# Patient Record
Sex: MALE | Race: WHITE | NOT HISPANIC OR LATINO | Employment: FULL TIME | URBAN - METROPOLITAN AREA
[De-identification: names, ages, dates, MRNs, and addresses within clinical notes are randomized per-mention and may not be internally consistent; named-entity substitution may affect disease eponyms.]

---

## 2021-10-05 ENCOUNTER — TELEPHONE (OUTPATIENT)
Dept: ORTHOPEDICS | Facility: CLINIC | Age: 41
End: 2021-10-05

## 2021-10-07 ENCOUNTER — TELEPHONE (OUTPATIENT)
Dept: ORTHOPEDICS | Facility: CLINIC | Age: 41
End: 2021-10-07

## 2021-10-07 ENCOUNTER — OFFICE VISIT (OUTPATIENT)
Dept: ORTHOPEDICS | Facility: CLINIC | Age: 41
End: 2021-10-07
Payer: OTHER MISCELLANEOUS

## 2021-10-07 ENCOUNTER — HOSPITAL ENCOUNTER (OUTPATIENT)
Dept: RADIOLOGY | Facility: HOSPITAL | Age: 41
Discharge: HOME | End: 2021-10-07
Attending: ORTHOPAEDIC SURGERY
Payer: OTHER MISCELLANEOUS

## 2021-10-07 VITALS
RESPIRATION RATE: 18 BRPM | WEIGHT: 200 LBS | HEIGHT: 71 IN | BODY MASS INDEX: 28 KG/M2 | SYSTOLIC BLOOD PRESSURE: 117 MMHG | DIASTOLIC BLOOD PRESSURE: 75 MMHG | HEART RATE: 80 BPM

## 2021-10-07 DIAGNOSIS — G89.29 CHRONIC PAIN OF LEFT ANKLE: ICD-10-CM

## 2021-10-07 DIAGNOSIS — S82.62XA CLOSED AVULSION FRACTURE OF LATERAL MALLEOLUS OF LEFT FIBULA, INITIAL ENCOUNTER: Primary | ICD-10-CM

## 2021-10-07 DIAGNOSIS — S93.492A SPRAIN OF ANTERIOR TALOFIBULAR LIGAMENT, LEFT, INITIAL ENCOUNTER: ICD-10-CM

## 2021-10-07 DIAGNOSIS — M25.572 CHRONIC PAIN OF LEFT ANKLE: ICD-10-CM

## 2021-10-07 PROCEDURE — 73610 X-RAY EXAM OF ANKLE: CPT | Mod: TC,LT

## 2021-10-07 PROCEDURE — 99999 PR PBB SHADOW E&M-EST. PATIENT-LVL III: ICD-10-PCS | Mod: PBBFAC,,, | Performed by: ORTHOPAEDIC SURGERY

## 2021-10-07 PROCEDURE — 73610 X-RAY EXAM OF ANKLE: CPT | Mod: 26,LT,, | Performed by: RADIOLOGY

## 2021-10-07 PROCEDURE — 99999 PR PBB SHADOW E&M-EST. PATIENT-LVL III: CPT | Mod: PBBFAC,,, | Performed by: ORTHOPAEDIC SURGERY

## 2021-10-07 PROCEDURE — 99204 PR OFFICE/OUTPT VISIT, NEW, LEVL IV, 45-59 MIN: ICD-10-PCS | Mod: S$GLB,,, | Performed by: ORTHOPAEDIC SURGERY

## 2021-10-07 PROCEDURE — 73610 XR ANKLE COMPLETE 3 VIEW LEFT: ICD-10-PCS | Mod: 26,LT,, | Performed by: RADIOLOGY

## 2021-10-07 PROCEDURE — 99204 OFFICE O/P NEW MOD 45 MIN: CPT | Mod: S$GLB,,, | Performed by: ORTHOPAEDIC SURGERY

## 2021-10-07 RX ORDER — HYDROCODONE BITARTRATE AND ACETAMINOPHEN 10; 325 MG/1; MG/1
1 TABLET ORAL EVERY 4 HOURS
COMMUNITY
Start: 2021-09-29 | End: 2021-12-23 | Stop reason: SDUPTHER

## 2021-10-07 RX ORDER — SILDENAFIL CITRATE 20 MG/1
TABLET ORAL
COMMUNITY
Start: 2021-07-10

## 2021-10-07 RX ORDER — NAPROXEN 500 MG/1
500 TABLET ORAL 2 TIMES DAILY PRN
COMMUNITY
Start: 2021-09-29 | End: 2022-04-11

## 2021-10-14 ENCOUNTER — OFFICE VISIT (OUTPATIENT)
Dept: ORTHOPEDICS | Facility: CLINIC | Age: 41
End: 2021-10-14
Payer: OTHER MISCELLANEOUS

## 2021-10-14 ENCOUNTER — HOSPITAL ENCOUNTER (OUTPATIENT)
Dept: RADIOLOGY | Facility: HOSPITAL | Age: 41
Discharge: HOME | End: 2021-10-14
Attending: PHYSICIAN ASSISTANT
Payer: OTHER MISCELLANEOUS

## 2021-10-14 DIAGNOSIS — S82.62XA CLOSED AVULSION FRACTURE OF LATERAL MALLEOLUS OF LEFT FIBULA, INITIAL ENCOUNTER: ICD-10-CM

## 2021-10-14 DIAGNOSIS — S82.62XA CLOSED AVULSION FRACTURE OF LATERAL MALLEOLUS OF LEFT FIBULA, INITIAL ENCOUNTER: Primary | ICD-10-CM

## 2021-10-14 PROCEDURE — 99213 OFFICE O/P EST LOW 20 MIN: CPT | Mod: S$GLB,,, | Performed by: PHYSICIAN ASSISTANT

## 2021-10-14 PROCEDURE — 73610 XR ANKLE COMPLETE 3 VIEW LEFT: ICD-10-PCS | Mod: 26,LT,, | Performed by: RADIOLOGY

## 2021-10-14 PROCEDURE — 99999 PR PBB SHADOW E&M-EST. PATIENT-LVL III: CPT | Mod: PBBFAC,,, | Performed by: PHYSICIAN ASSISTANT

## 2021-10-14 PROCEDURE — 73610 X-RAY EXAM OF ANKLE: CPT | Mod: TC,LT

## 2021-10-14 PROCEDURE — 73610 X-RAY EXAM OF ANKLE: CPT | Mod: 26,LT,, | Performed by: RADIOLOGY

## 2021-10-14 PROCEDURE — 99999 PR PBB SHADOW E&M-EST. PATIENT-LVL III: ICD-10-PCS | Mod: PBBFAC,,, | Performed by: PHYSICIAN ASSISTANT

## 2021-10-14 PROCEDURE — 99213 PR OFFICE/OUTPT VISIT, EST, LEVL III, 20-29 MIN: ICD-10-PCS | Mod: S$GLB,,, | Performed by: PHYSICIAN ASSISTANT

## 2021-10-14 RX ORDER — MELOXICAM 15 MG/1
15 TABLET ORAL DAILY
Qty: 30 TABLET | Refills: 3 | Status: SHIPPED | OUTPATIENT
Start: 2021-10-14 | End: 2021-11-13

## 2021-10-14 RX ORDER — METHOCARBAMOL 500 MG/1
500 TABLET, FILM COATED ORAL 3 TIMES DAILY
Qty: 42 TABLET | Refills: 0 | Status: SHIPPED | OUTPATIENT
Start: 2021-10-14 | End: 2021-10-28

## 2021-10-14 RX ORDER — OXYCODONE HYDROCHLORIDE 5 MG/1
5 TABLET ORAL EVERY 4 HOURS PRN
Qty: 42 TABLET | Refills: 0 | Status: SHIPPED | OUTPATIENT
Start: 2021-10-14 | End: 2021-10-21

## 2021-10-14 RX ORDER — GABAPENTIN 100 MG/1
100 CAPSULE ORAL 3 TIMES DAILY
Qty: 90 CAPSULE | Refills: 0 | Status: SHIPPED | OUTPATIENT
Start: 2021-10-14 | End: 2021-12-28

## 2021-10-14 RX ORDER — ACETAMINOPHEN 500 MG
1000 TABLET ORAL EVERY 8 HOURS
Qty: 90 TABLET | Refills: 0 | Status: SHIPPED | OUTPATIENT
Start: 2021-10-14 | End: 2022-03-31 | Stop reason: SDUPTHER

## 2021-10-27 ENCOUNTER — CLINICAL SUPPORT (OUTPATIENT)
Dept: REHABILITATION | Facility: HOSPITAL | Age: 41
End: 2021-10-27
Attending: PHYSICIAN ASSISTANT
Payer: OTHER MISCELLANEOUS

## 2021-10-27 DIAGNOSIS — M25.572 ACUTE LEFT ANKLE PAIN: ICD-10-CM

## 2021-10-27 DIAGNOSIS — R26.2 DIFFICULTY IN WALKING INVOLVING ANKLE AND FOOT JOINT: ICD-10-CM

## 2021-10-27 DIAGNOSIS — M25.672 ANKLE STIFFNESS, LEFT: ICD-10-CM

## 2021-10-27 PROCEDURE — 97161 PT EVAL LOW COMPLEX 20 MIN: CPT | Mod: PN

## 2021-10-28 ENCOUNTER — OFFICE VISIT (OUTPATIENT)
Dept: ORTHOPEDICS | Facility: CLINIC | Age: 41
End: 2021-10-28
Payer: OTHER MISCELLANEOUS

## 2021-10-28 ENCOUNTER — HOSPITAL ENCOUNTER (OUTPATIENT)
Dept: RADIOLOGY | Facility: HOSPITAL | Age: 41
Discharge: HOME | End: 2021-10-28
Attending: PHYSICIAN ASSISTANT
Payer: OTHER MISCELLANEOUS

## 2021-10-28 VITALS — HEIGHT: 71 IN | BODY MASS INDEX: 27.99 KG/M2 | WEIGHT: 199.94 LBS

## 2021-10-28 DIAGNOSIS — S93.492A SPRAIN OF ANTERIOR TALOFIBULAR LIGAMENT, LEFT, INITIAL ENCOUNTER: ICD-10-CM

## 2021-10-28 DIAGNOSIS — S82.62XD CLOSED AVULSION FRACTURE OF LATERAL MALLEOLUS OF LEFT FIBULA WITH ROUTINE HEALING, SUBSEQUENT ENCOUNTER: Primary | ICD-10-CM

## 2021-10-28 DIAGNOSIS — S82.62XD CLOSED AVULSION FRACTURE OF LATERAL MALLEOLUS OF LEFT FIBULA WITH ROUTINE HEALING, SUBSEQUENT ENCOUNTER: ICD-10-CM

## 2021-10-28 PROCEDURE — 99213 OFFICE O/P EST LOW 20 MIN: CPT | Mod: S$GLB,,, | Performed by: PHYSICIAN ASSISTANT

## 2021-10-28 PROCEDURE — 99213 PR OFFICE/OUTPT VISIT, EST, LEVL III, 20-29 MIN: ICD-10-PCS | Mod: S$GLB,,, | Performed by: PHYSICIAN ASSISTANT

## 2021-10-28 PROCEDURE — 73610 X-RAY EXAM OF ANKLE: CPT | Mod: 26,LT,, | Performed by: RADIOLOGY

## 2021-10-28 PROCEDURE — 99999 PR PBB SHADOW E&M-EST. PATIENT-LVL III: ICD-10-PCS | Mod: PBBFAC,,, | Performed by: PHYSICIAN ASSISTANT

## 2021-10-28 PROCEDURE — 73610 X-RAY EXAM OF ANKLE: CPT | Mod: TC,LT

## 2021-10-28 PROCEDURE — 73610 XR ANKLE COMPLETE 3 VIEW LEFT: ICD-10-PCS | Mod: 26,LT,, | Performed by: RADIOLOGY

## 2021-10-28 PROCEDURE — 99999 PR PBB SHADOW E&M-EST. PATIENT-LVL III: CPT | Mod: PBBFAC,,, | Performed by: PHYSICIAN ASSISTANT

## 2021-10-28 RX ORDER — GABAPENTIN 300 MG/1
300 CAPSULE ORAL 3 TIMES DAILY
Qty: 90 CAPSULE | Refills: 11 | Status: SHIPPED | OUTPATIENT
Start: 2021-10-28 | End: 2021-12-28

## 2021-10-29 ENCOUNTER — CLINICAL SUPPORT (OUTPATIENT)
Dept: REHABILITATION | Facility: HOSPITAL | Age: 41
End: 2021-10-29
Payer: OTHER MISCELLANEOUS

## 2021-10-29 DIAGNOSIS — M25.572 ACUTE LEFT ANKLE PAIN: ICD-10-CM

## 2021-10-29 DIAGNOSIS — R26.2 DIFFICULTY IN WALKING INVOLVING ANKLE AND FOOT JOINT: ICD-10-CM

## 2021-10-29 DIAGNOSIS — M25.672 ANKLE STIFFNESS, LEFT: ICD-10-CM

## 2021-10-29 PROCEDURE — 97110 THERAPEUTIC EXERCISES: CPT | Mod: PN,CQ

## 2021-11-01 ENCOUNTER — CLINICAL SUPPORT (OUTPATIENT)
Dept: REHABILITATION | Facility: HOSPITAL | Age: 41
End: 2021-11-01
Payer: OTHER MISCELLANEOUS

## 2021-11-01 DIAGNOSIS — R26.2 DIFFICULTY IN WALKING INVOLVING ANKLE AND FOOT JOINT: ICD-10-CM

## 2021-11-01 DIAGNOSIS — M25.672 ANKLE STIFFNESS, LEFT: ICD-10-CM

## 2021-11-01 DIAGNOSIS — M25.572 ACUTE LEFT ANKLE PAIN: ICD-10-CM

## 2021-11-01 PROCEDURE — 97113 AQUATIC THERAPY/EXERCISES: CPT | Mod: PN,CQ

## 2021-11-02 ENCOUNTER — CLINICAL SUPPORT (OUTPATIENT)
Dept: REHABILITATION | Facility: HOSPITAL | Age: 41
End: 2021-11-02
Payer: OTHER MISCELLANEOUS

## 2021-11-02 DIAGNOSIS — R26.2 DIFFICULTY IN WALKING INVOLVING ANKLE AND FOOT JOINT: ICD-10-CM

## 2021-11-02 DIAGNOSIS — M25.572 ACUTE LEFT ANKLE PAIN: ICD-10-CM

## 2021-11-02 DIAGNOSIS — M25.672 ANKLE STIFFNESS, LEFT: ICD-10-CM

## 2021-11-02 PROCEDURE — 97110 THERAPEUTIC EXERCISES: CPT | Mod: PN,CQ

## 2021-11-04 ENCOUNTER — TELEPHONE (OUTPATIENT)
Dept: ORTHOPEDICS | Facility: CLINIC | Age: 41
End: 2021-11-04
Payer: OTHER MISCELLANEOUS

## 2021-11-04 DIAGNOSIS — S82.62XD CLOSED AVULSION FRACTURE OF LATERAL MALLEOLUS OF LEFT FIBULA WITH ROUTINE HEALING, SUBSEQUENT ENCOUNTER: Primary | ICD-10-CM

## 2021-11-04 RX ORDER — OXYCODONE HYDROCHLORIDE 5 MG/1
5 TABLET ORAL EVERY 4 HOURS PRN
Qty: 42 TABLET | Refills: 0 | Status: SHIPPED | OUTPATIENT
Start: 2021-11-04 | End: 2021-11-11

## 2021-11-04 RX ORDER — METHOCARBAMOL 500 MG/1
500 TABLET, FILM COATED ORAL 3 TIMES DAILY
Qty: 42 TABLET | Refills: 0 | Status: SHIPPED | OUTPATIENT
Start: 2021-11-04 | End: 2021-11-18

## 2021-11-05 ENCOUNTER — CLINICAL SUPPORT (OUTPATIENT)
Dept: REHABILITATION | Facility: HOSPITAL | Age: 41
End: 2021-11-05
Payer: OTHER MISCELLANEOUS

## 2021-11-05 DIAGNOSIS — M25.572 ACUTE LEFT ANKLE PAIN: ICD-10-CM

## 2021-11-05 DIAGNOSIS — R26.2 DIFFICULTY IN WALKING INVOLVING ANKLE AND FOOT JOINT: ICD-10-CM

## 2021-11-05 DIAGNOSIS — M25.672 ANKLE STIFFNESS, LEFT: ICD-10-CM

## 2021-11-05 PROCEDURE — 97110 THERAPEUTIC EXERCISES: CPT | Mod: PN

## 2021-11-08 ENCOUNTER — CLINICAL SUPPORT (OUTPATIENT)
Dept: REHABILITATION | Facility: HOSPITAL | Age: 41
End: 2021-11-08
Payer: OTHER MISCELLANEOUS

## 2021-11-08 DIAGNOSIS — M25.572 ACUTE LEFT ANKLE PAIN: ICD-10-CM

## 2021-11-08 DIAGNOSIS — M25.672 ANKLE STIFFNESS, LEFT: ICD-10-CM

## 2021-11-08 DIAGNOSIS — R26.2 DIFFICULTY IN WALKING INVOLVING ANKLE AND FOOT JOINT: ICD-10-CM

## 2021-11-08 PROCEDURE — 97110 THERAPEUTIC EXERCISES: CPT | Mod: PN

## 2021-11-09 ENCOUNTER — DOCUMENTATION ONLY (OUTPATIENT)
Dept: REHABILITATION | Facility: HOSPITAL | Age: 41
End: 2021-11-09
Payer: OTHER MISCELLANEOUS

## 2021-11-10 ENCOUNTER — CLINICAL SUPPORT (OUTPATIENT)
Dept: REHABILITATION | Facility: HOSPITAL | Age: 41
End: 2021-11-10
Payer: OTHER MISCELLANEOUS

## 2021-11-10 DIAGNOSIS — M25.572 ACUTE LEFT ANKLE PAIN: ICD-10-CM

## 2021-11-10 DIAGNOSIS — M25.672 ANKLE STIFFNESS, LEFT: ICD-10-CM

## 2021-11-10 DIAGNOSIS — R26.2 DIFFICULTY IN WALKING INVOLVING ANKLE AND FOOT JOINT: ICD-10-CM

## 2021-11-10 PROCEDURE — 97110 THERAPEUTIC EXERCISES: CPT | Mod: PN,CQ

## 2021-11-12 ENCOUNTER — CLINICAL SUPPORT (OUTPATIENT)
Dept: REHABILITATION | Facility: HOSPITAL | Age: 41
End: 2021-11-12
Payer: OTHER MISCELLANEOUS

## 2021-11-12 DIAGNOSIS — M25.572 ACUTE LEFT ANKLE PAIN: ICD-10-CM

## 2021-11-12 DIAGNOSIS — R26.2 DIFFICULTY IN WALKING INVOLVING ANKLE AND FOOT JOINT: ICD-10-CM

## 2021-11-12 DIAGNOSIS — M25.672 ANKLE STIFFNESS, LEFT: ICD-10-CM

## 2021-11-12 PROCEDURE — 97110 THERAPEUTIC EXERCISES: CPT | Mod: PN,CQ

## 2021-11-15 ENCOUNTER — CLINICAL SUPPORT (OUTPATIENT)
Dept: REHABILITATION | Facility: HOSPITAL | Age: 41
End: 2021-11-15
Payer: OTHER MISCELLANEOUS

## 2021-11-15 DIAGNOSIS — M25.572 ACUTE LEFT ANKLE PAIN: ICD-10-CM

## 2021-11-15 DIAGNOSIS — M25.672 ANKLE STIFFNESS, LEFT: ICD-10-CM

## 2021-11-15 DIAGNOSIS — R26.2 DIFFICULTY IN WALKING INVOLVING ANKLE AND FOOT JOINT: ICD-10-CM

## 2021-11-15 PROCEDURE — 97110 THERAPEUTIC EXERCISES: CPT | Mod: PN,CQ

## 2021-11-16 ENCOUNTER — CLINICAL SUPPORT (OUTPATIENT)
Dept: REHABILITATION | Facility: HOSPITAL | Age: 41
End: 2021-11-16
Payer: OTHER MISCELLANEOUS

## 2021-11-16 DIAGNOSIS — R26.2 DIFFICULTY IN WALKING INVOLVING ANKLE AND FOOT JOINT: ICD-10-CM

## 2021-11-16 DIAGNOSIS — M25.672 ANKLE STIFFNESS, LEFT: ICD-10-CM

## 2021-11-16 DIAGNOSIS — M25.572 ACUTE LEFT ANKLE PAIN: ICD-10-CM

## 2021-11-16 PROCEDURE — 97110 THERAPEUTIC EXERCISES: CPT | Mod: PN,CQ

## 2021-11-17 ENCOUNTER — TELEPHONE (OUTPATIENT)
Dept: NEUROLOGY | Facility: CLINIC | Age: 41
End: 2021-11-17
Payer: OTHER MISCELLANEOUS

## 2021-11-19 ENCOUNTER — CLINICAL SUPPORT (OUTPATIENT)
Dept: REHABILITATION | Facility: HOSPITAL | Age: 41
End: 2021-11-19
Payer: OTHER MISCELLANEOUS

## 2021-11-19 DIAGNOSIS — M25.672 ANKLE STIFFNESS, LEFT: ICD-10-CM

## 2021-11-19 DIAGNOSIS — R26.2 DIFFICULTY IN WALKING INVOLVING ANKLE AND FOOT JOINT: ICD-10-CM

## 2021-11-19 DIAGNOSIS — M25.572 ACUTE LEFT ANKLE PAIN: ICD-10-CM

## 2021-11-19 PROCEDURE — 97110 THERAPEUTIC EXERCISES: CPT | Mod: PN

## 2021-11-22 ENCOUNTER — CLINICAL SUPPORT (OUTPATIENT)
Dept: REHABILITATION | Facility: HOSPITAL | Age: 41
End: 2021-11-22
Payer: OTHER MISCELLANEOUS

## 2021-11-22 DIAGNOSIS — M25.572 ACUTE LEFT ANKLE PAIN: ICD-10-CM

## 2021-11-22 DIAGNOSIS — S82.62XD CLOSED AVULSION FRACTURE OF LATERAL MALLEOLUS OF LEFT FIBULA WITH ROUTINE HEALING, SUBSEQUENT ENCOUNTER: Primary | ICD-10-CM

## 2021-11-22 DIAGNOSIS — R26.2 DIFFICULTY IN WALKING INVOLVING ANKLE AND FOOT JOINT: ICD-10-CM

## 2021-11-22 DIAGNOSIS — M25.672 ANKLE STIFFNESS, LEFT: ICD-10-CM

## 2021-11-22 PROCEDURE — 97110 THERAPEUTIC EXERCISES: CPT | Mod: PN,CQ

## 2021-12-06 ENCOUNTER — CLINICAL SUPPORT (OUTPATIENT)
Dept: REHABILITATION | Facility: HOSPITAL | Age: 41
End: 2021-12-06
Payer: OTHER MISCELLANEOUS

## 2021-12-06 ENCOUNTER — OFFICE VISIT (OUTPATIENT)
Dept: ORTHOPEDICS | Facility: CLINIC | Age: 41
End: 2021-12-06
Payer: OTHER MISCELLANEOUS

## 2021-12-06 DIAGNOSIS — M25.672 ANKLE STIFFNESS, LEFT: ICD-10-CM

## 2021-12-06 DIAGNOSIS — R20.0 NUMBNESS AND TINGLING OF FOOT: Primary | ICD-10-CM

## 2021-12-06 DIAGNOSIS — R26.2 DIFFICULTY IN WALKING INVOLVING ANKLE AND FOOT JOINT: ICD-10-CM

## 2021-12-06 DIAGNOSIS — S82.62XD CLOSED AVULSION FRACTURE OF LATERAL MALLEOLUS OF LEFT FIBULA WITH ROUTINE HEALING, SUBSEQUENT ENCOUNTER: ICD-10-CM

## 2021-12-06 DIAGNOSIS — M25.572 PAIN OF JOINT OF LEFT ANKLE AND FOOT: ICD-10-CM

## 2021-12-06 DIAGNOSIS — M25.572 ACUTE LEFT ANKLE PAIN: ICD-10-CM

## 2021-12-06 DIAGNOSIS — R20.2 NUMBNESS AND TINGLING OF FOOT: Primary | ICD-10-CM

## 2021-12-06 PROCEDURE — 97110 THERAPEUTIC EXERCISES: CPT | Mod: PN

## 2021-12-06 PROCEDURE — 99999 PR PBB SHADOW E&M-EST. PATIENT-LVL II: ICD-10-PCS | Mod: PBBFAC,,, | Performed by: PHYSICIAN ASSISTANT

## 2021-12-06 PROCEDURE — 99999 PR PBB SHADOW E&M-EST. PATIENT-LVL II: CPT | Mod: PBBFAC,,, | Performed by: PHYSICIAN ASSISTANT

## 2021-12-06 PROCEDURE — 99213 PR OFFICE/OUTPT VISIT, EST, LEVL III, 20-29 MIN: ICD-10-PCS | Mod: S$GLB,,, | Performed by: PHYSICIAN ASSISTANT

## 2021-12-06 PROCEDURE — 99213 OFFICE O/P EST LOW 20 MIN: CPT | Mod: S$GLB,,, | Performed by: PHYSICIAN ASSISTANT

## 2021-12-10 ENCOUNTER — PATIENT MESSAGE (OUTPATIENT)
Dept: ORTHOPEDICS | Facility: CLINIC | Age: 41
End: 2021-12-10
Payer: OTHER MISCELLANEOUS

## 2021-12-13 ENCOUNTER — CLINICAL SUPPORT (OUTPATIENT)
Dept: REHABILITATION | Facility: HOSPITAL | Age: 41
End: 2021-12-13
Payer: OTHER MISCELLANEOUS

## 2021-12-13 DIAGNOSIS — M25.672 ANKLE STIFFNESS, LEFT: ICD-10-CM

## 2021-12-13 DIAGNOSIS — M25.572 ACUTE LEFT ANKLE PAIN: ICD-10-CM

## 2021-12-13 DIAGNOSIS — R26.2 DIFFICULTY IN WALKING INVOLVING ANKLE AND FOOT JOINT: ICD-10-CM

## 2021-12-13 PROCEDURE — 97110 THERAPEUTIC EXERCISES: CPT | Mod: PN

## 2021-12-15 ENCOUNTER — CLINICAL SUPPORT (OUTPATIENT)
Dept: REHABILITATION | Facility: HOSPITAL | Age: 41
End: 2021-12-15
Payer: OTHER MISCELLANEOUS

## 2021-12-15 DIAGNOSIS — M25.672 ANKLE STIFFNESS, LEFT: ICD-10-CM

## 2021-12-15 DIAGNOSIS — R26.2 DIFFICULTY IN WALKING INVOLVING ANKLE AND FOOT JOINT: ICD-10-CM

## 2021-12-15 DIAGNOSIS — M25.572 ACUTE LEFT ANKLE PAIN: ICD-10-CM

## 2021-12-15 PROCEDURE — 97110 THERAPEUTIC EXERCISES: CPT | Mod: PN,CQ

## 2021-12-20 ENCOUNTER — CLINICAL SUPPORT (OUTPATIENT)
Dept: REHABILITATION | Facility: HOSPITAL | Age: 41
End: 2021-12-20
Payer: OTHER MISCELLANEOUS

## 2021-12-20 DIAGNOSIS — R26.2 DIFFICULTY IN WALKING INVOLVING ANKLE AND FOOT JOINT: ICD-10-CM

## 2021-12-20 DIAGNOSIS — M25.572 ACUTE LEFT ANKLE PAIN: ICD-10-CM

## 2021-12-20 DIAGNOSIS — M25.672 ANKLE STIFFNESS, LEFT: ICD-10-CM

## 2021-12-20 PROCEDURE — 97110 THERAPEUTIC EXERCISES: CPT | Mod: PN,CQ

## 2021-12-23 ENCOUNTER — CLINICAL SUPPORT (OUTPATIENT)
Dept: REHABILITATION | Facility: HOSPITAL | Age: 41
End: 2021-12-23
Payer: OTHER MISCELLANEOUS

## 2021-12-23 DIAGNOSIS — M25.572 PAIN OF JOINT OF LEFT ANKLE AND FOOT: Primary | ICD-10-CM

## 2021-12-23 DIAGNOSIS — M25.672 ANKLE STIFFNESS, LEFT: ICD-10-CM

## 2021-12-23 DIAGNOSIS — R26.2 DIFFICULTY IN WALKING INVOLVING ANKLE AND FOOT JOINT: ICD-10-CM

## 2021-12-23 DIAGNOSIS — M25.572 ACUTE LEFT ANKLE PAIN: ICD-10-CM

## 2021-12-23 PROCEDURE — 97110 THERAPEUTIC EXERCISES: CPT | Mod: PN,CQ

## 2021-12-23 RX ORDER — HYDROCODONE BITARTRATE AND ACETAMINOPHEN 10; 325 MG/1; MG/1
1 TABLET ORAL EVERY 6 HOURS PRN
Qty: 28 TABLET | Refills: 0 | Status: SHIPPED | OUTPATIENT
Start: 2021-12-23 | End: 2021-12-30

## 2021-12-23 RX ORDER — AMITRIPTYLINE HYDROCHLORIDE 25 MG/1
25 TABLET, FILM COATED ORAL NIGHTLY PRN
Qty: 30 TABLET | Refills: 0 | Status: SHIPPED | OUTPATIENT
Start: 2021-12-23 | End: 2022-06-09

## 2021-12-27 ENCOUNTER — CLINICAL SUPPORT (OUTPATIENT)
Dept: REHABILITATION | Facility: HOSPITAL | Age: 41
End: 2021-12-27
Payer: OTHER MISCELLANEOUS

## 2021-12-27 DIAGNOSIS — M25.572 ACUTE LEFT ANKLE PAIN: ICD-10-CM

## 2021-12-27 DIAGNOSIS — M25.672 ANKLE STIFFNESS, LEFT: ICD-10-CM

## 2021-12-27 DIAGNOSIS — R26.2 DIFFICULTY IN WALKING INVOLVING ANKLE AND FOOT JOINT: ICD-10-CM

## 2021-12-27 PROCEDURE — 97110 THERAPEUTIC EXERCISES: CPT | Mod: PN

## 2021-12-28 ENCOUNTER — OFFICE VISIT (OUTPATIENT)
Dept: PODIATRY | Facility: CLINIC | Age: 41
End: 2021-12-28
Payer: OTHER MISCELLANEOUS

## 2021-12-28 VITALS
DIASTOLIC BLOOD PRESSURE: 84 MMHG | SYSTOLIC BLOOD PRESSURE: 121 MMHG | HEIGHT: 71 IN | BODY MASS INDEX: 27.99 KG/M2 | HEART RATE: 90 BPM | WEIGHT: 199.94 LBS

## 2021-12-28 DIAGNOSIS — M95.8 OSTEOCHONDRAL DEFECT OF TALUS: Primary | ICD-10-CM

## 2021-12-28 DIAGNOSIS — T14.8XXA LIGAMENT RUPTURE: ICD-10-CM

## 2021-12-28 DIAGNOSIS — G57.82 NERVE ENTRAPMENT OF LOWER LIMB, LEFT: ICD-10-CM

## 2021-12-28 PROCEDURE — 99203 PR OFFICE/OUTPT VISIT, NEW, LEVL III, 30-44 MIN: ICD-10-PCS | Mod: S$GLB,,, | Performed by: PODIATRIST

## 2021-12-28 PROCEDURE — 99999 PR PBB SHADOW E&M-EST. PATIENT-LVL III: CPT | Mod: PBBFAC,,, | Performed by: PODIATRIST

## 2021-12-28 PROCEDURE — 99203 OFFICE O/P NEW LOW 30 MIN: CPT | Mod: S$GLB,,, | Performed by: PODIATRIST

## 2021-12-28 PROCEDURE — 99999 PR PBB SHADOW E&M-EST. PATIENT-LVL III: ICD-10-PCS | Mod: PBBFAC,,, | Performed by: PODIATRIST

## 2021-12-28 RX ORDER — LIDOCAINE HYDROCHLORIDE 20 MG/ML
JELLY TOPICAL
Qty: 30 ML | Refills: 2 | Status: SHIPPED | OUTPATIENT
Start: 2021-12-28

## 2021-12-28 RX ORDER — PREGABALIN 75 MG/1
75 CAPSULE ORAL 2 TIMES DAILY
Qty: 60 CAPSULE | Refills: 6 | Status: SHIPPED | OUTPATIENT
Start: 2021-12-28 | End: 2022-03-31

## 2021-12-29 ENCOUNTER — CLINICAL SUPPORT (OUTPATIENT)
Dept: REHABILITATION | Facility: HOSPITAL | Age: 41
End: 2021-12-29
Payer: OTHER MISCELLANEOUS

## 2021-12-29 DIAGNOSIS — M25.572 ACUTE LEFT ANKLE PAIN: ICD-10-CM

## 2021-12-29 DIAGNOSIS — M25.672 ANKLE STIFFNESS, LEFT: ICD-10-CM

## 2021-12-29 DIAGNOSIS — R26.2 DIFFICULTY IN WALKING INVOLVING ANKLE AND FOOT JOINT: ICD-10-CM

## 2021-12-29 PROCEDURE — 97110 THERAPEUTIC EXERCISES: CPT | Mod: PN,CQ

## 2021-12-30 ENCOUNTER — CLINICAL SUPPORT (OUTPATIENT)
Dept: REHABILITATION | Facility: HOSPITAL | Age: 41
End: 2021-12-30
Payer: OTHER MISCELLANEOUS

## 2021-12-30 DIAGNOSIS — R26.2 DIFFICULTY IN WALKING INVOLVING ANKLE AND FOOT JOINT: ICD-10-CM

## 2021-12-30 DIAGNOSIS — M25.572 ACUTE LEFT ANKLE PAIN: ICD-10-CM

## 2021-12-30 DIAGNOSIS — M25.672 ANKLE STIFFNESS, LEFT: ICD-10-CM

## 2021-12-30 PROCEDURE — 97110 THERAPEUTIC EXERCISES: CPT | Mod: PN,CQ

## 2021-12-30 NOTE — PROGRESS NOTES
"  Work[employer] Physical Therapy Daily Treatment Note     Name: Albin Barfield  Clinic Number: [id number]    Therapy Diagnosis:   No diagnosis found.  Physician: Srinivas Zarco NP    Visit Date: 12/30/2021    Physician Orders: PT Eval and Treat   Medical Diagnosis from Referral: S82.62XA (ICD-10-CM) - Closed avulsion fracture of lateral malleolus of left fibula, initial encounter   Evaluation Date: 10/27/2021  Authorization Period Expiration: 10/15/2022   Plan of Care Expiration: 1/4/2021  Visit # / Visits authorized: 12/12, 8/12  FOTO: 5/5, 5/5, 5/5, 2/5   PTA: 2/5  (# of No Show Appts 0/Number of Cancelled Appts 0)    Time In: 1245pm  Time Out: 0138pm  Total Appointment Time (timed & untimed codes): 53 minutes (4TE)    Precautions: Standard    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  At this point his syndesmosis does look stable.  Continue conservative treatment. tall Cam boot with ambulation. Remove for daily ROMAT and hygiene. Pain mangement: started on multimodal approach to pain control Also added gabapentin which he will titrate up and he is to take OTC vitamin C. RICE to reduce pain and swelling.  I will place an order for PT. Patient is to make appointment himself.  Will monitor for development of CRPS.  I will have him follow up in clinic in 2 weeks time with standing x-rays of the left ankle to again evaluate the syndesmosis. RTW: at this time he is unable to RTW."    Subjective     Pt reports: that his ankle pain increased yesterday post PT session.   He was partially compliant with home exercise program.  Response to previous treatment: increase ankle pain   Functional change: none     Pain: 4 to 5/10 upon waking; easily elevates to 8/10   Location: lateral " "malleolus; plantar and dorsum of L foot > posterior ankle     Objective/Treatment     Albin received the following treatment:    Albin received therapeutic exercises to develop strength, ROM and flexibility for 60 minutes including testing above and treatment activities below:  Bold = performed  Blue = initiated, new activity for today    Sitting:  ABCs in sittinx   Ankle 4 way BTB: 2x15   Peroneal nn glides grade 2 long sitting x25; grade 2   Toe scrunches on towel: x30  [place] pick ups: 2 min (picked up 5 marbles)  Nerve flossing x20 (cause nerve pain from mid tibia to toes)     Standing:  Fitter DF/PF x2' (difficulty with max PF)  Fitter IN/EV x2'  Round fitter cw/ccw 2 min/each direction (NP due to pain in toe)   Lateral weight shifting to SLS to max x5: max 11"  Self MWM fwd lunges over airex pad x20 5" hold for DF closed chain gains (min anterior ankle discomfort at end ROM)  Standing fitter calf stretch 1' x2 bias gastroc   Heel raises x10 from flat ground, x10 with calcaneus propped up, x10 with great toe propped up (working on closed chain GTExt)  Toe raises x10  Stationary marching x15 with 5 sec holds     LE Weight shifting fwd/bkw with BUE support  Weight shifting focusing on achieving toe off, weight shift to the MTs, put has marked difficulty with x25  Step through with BUE support with focus on proper push off at terminal contact, hip/knee flexion in swing and heel strike at initial contact  Standing fitter calf stretch 30" x3 bias soleus   1/2 foam roll (flat side up) single limb stance L LE 30" balance x2  Toe taps on 6 inch step with BUE support: 1x10/side  Step up and through 6 inch step with LUE support: 2x15/side    Single leg static standing on LLE on stable surface:2x30 sec   Static standing  on LLE on Airex pad: 2x30 sec  Static standing on round side of BOSU with BUE support: 20 sec   Lateral weight shifting on round side of BOSU with BUE support: 45 sec (reported lateral ankle " "pain)  Sit to stand from standard chair with verbal cues for equal weight distribution: 2x10  Step downs on 6" step with BUE support, LLE only: 1x10  Standing L SLS with ball ant to posterior glides with R LE 2x10      Albin received the following manual therapy techniques for 0 minutes, including:    Home Exercises Provided and Patient Education Provided  Education provided: Pt was educated on HEP and on all therapeutic exercises initiated during today's tx visit.      Written Home Exercises Provided: yes  Exercises were reviewed and Albin was able to demonstrate them prior to the end of the session.  Albin demonstrated good  understanding of the education provided.     See EMR under Patient Instructions for exercises provided 10/27/2021 and 10/29/2021.    Assessment   Albin is a 41 y.o. male referred to outpatient Physical Therapy with a medical diagnosis of closed avulsion fracture of lateral malleolus of left fibula. Pt with recent MRI findings:  1. Complete tear of anterior talofibular ligament and sprain of calcaneofibular ligament.  2. Probable osteochondral injury of lateral talar dome.  Moderate joint effusion.  3. Heterogeneous signal involving the deltoid ligament, possible sprain or contusion.  4. Osseous contusion of medial talus and medial malleolus.  5. Os trigonum with marrow edema at the synchondrosis.    Pt reported that he has an appointment in mid January to visit an orthopedic specialist to discuss possible ankle surgery as recommended by his current podiatrists. Pt also reported that podiatrist opted not to give him an injection secondary to ankle surgery being very likely.  Overall the pt continues to have chief c/o marked Left ankle/foot hypersensitivity to touch about the lateral ankle/foot proximal to the metatarsal heads.     Pt reported an increase in L ankle pain post yesterday's tx visit. Hence, today's tx visit was limited as no ankle stabilization exercises were performed " during today's tx visit. Albin is putting forth max effort each visit within the clinic. Pt will benefit from additional PT intervention to focus on continuing to improve ankle/foot mobility, strength, hypersensitivity, and improve weight bearing, weight acceptance, gait cycle, and functional strengthening. Suspect he may eventually benefit from work conditioning when able to tolerate.     Pt prognosis is Good.      Albin is progressing well towards his goals.   Pt prognosis is Good.     Pt will continue to benefit from skilled Physical Therapy interventions in order to address the deficits listed in the problem list box on initial evaluation, provide education, and to address the musculoskeletal limitations and work-related functional deficits for their job as a .    Pt's spiritual, cultural and educational needs considered and pt agreeable to plan of care and goals.     Anticipated barriers to physical therapy: acuity of current injury and marked irritability and severity of s/s    Goals:     Short Term Goals: 8 weeks   1.) Pt will become compliant and independent with his initial HEP to promote decreased pain and improved mobility and strength. MET  2.) Pt will become compliant and independent with an updated, progressed HEP to promote decreased pain and improved mobility and strength as well as prep for discharge from further PT intervention. Ongoing  3.) Pt to report decrease in pain levels from 8/10 at worse to 3/10 at worse. Ongoing  4.) Pt will improve his FOTO score from 60% impairment to 35% improvement to indicate improvement in overall function.  Ongoing  5.) Pt will demonstrate full L ankle AROM in all planes with no c/o s/s to improve general mobility. Improving, Ongoing  6.) Pt will demonstrate ability to carry 50lbs x100 ft within the clinic in order to simulate RTW goals. Ongoing  7.) Pt to improve L LE MMT scores to 5/5 within both flexion and extension. Improving, Ongoing  8.) Pt  will demonstrate ability to walk 10 minutes in 1 bout on treadmill in clinic in order to simulate RTW goals. Ongoing  9.) Pt able to return to prior exercise routine with little to no c/o increased R knee pain. Ongoing     Pt will return to work full duty, full time.      Plan   See updated POC: 1/4/2021     Upon discharge from further skilled PT intervention it will determined if the need for a work conditioning program or Functional Capacity Evaluation is required to allow the injured worker to return to work with full potential achieved, continued improvement with body mechanics with advanced functional activities, and further prevention of future work-related injuries.       Frankie Reyes, PTA   12/30/2021

## 2022-01-02 NOTE — PROGRESS NOTES
Subjective:      Patient ID: Albin Barfield is a 41 y.o. male.    Chief Complaint: Ankle Injury (Left ankle)    Albin is a 41 y.o. male who presents to the podiatry clinic  with complaint of  left foot pain. Onset of the symptoms was several months ago. Precipitating event: inversion injury to foot. Current symptoms include: inability to bear weight. Aggravating factors: any weight bearing. Symptoms have gradually worsened. Patient has had no prior foot problems. Evaluation to date: Ortho consult: Possible nerve entrapment injury as well as osteochondral lesion per MRI left ankle.. Treatment to date: avoidance of offending activity. Patients rates pain 7/10 on pain scale.        Review of Systems   Constitutional: Negative for chills, decreased appetite, fever and malaise/fatigue.   HENT: Negative for congestion, hearing loss, nosebleeds and tinnitus.    Eyes: Negative for double vision, pain, photophobia and visual disturbance.   Cardiovascular: Negative for chest pain, claudication, cyanosis and leg swelling.   Respiratory: Negative for cough, hemoptysis, shortness of breath and wheezing.    Endocrine: Negative for cold intolerance and heat intolerance.   Hematologic/Lymphatic: Negative for adenopathy and bleeding problem.   Skin: Negative for color change, dry skin, itching, nail changes and suspicious lesions.   Musculoskeletal: Positive for joint pain, joint swelling and stiffness. Negative for arthritis and myalgias.   Gastrointestinal: Negative for abdominal pain, jaundice, nausea and vomiting.   Genitourinary: Negative for dysuria, frequency and hematuria.   Neurological: Positive for numbness, paresthesias and sensory change. Negative for difficulty with concentration and loss of balance.   Psychiatric/Behavioral: Negative for altered mental status, hallucinations and suicidal ideas. The patient is not nervous/anxious.    Allergic/Immunologic: Negative for environmental allergies and persistent  infections.           Objective:      Physical Exam  Constitutional:       General: Vital signs are normal.      Appearance: He is well-developed and well-nourished.   HENT:      Head: Normocephalic and atraumatic.   Cardiovascular:      Pulses:           Dorsalis pedis pulses are 2+ on the right side and 2+ on the left side.        Posterior tibial pulses are 2+ on the right side and 2+ on the left side.   Pulmonary:      Effort: Pulmonary effort is normal.   Musculoskeletal:      Right foot: Normal range of motion. No deformity.      Left foot: Normal range of motion. No deformity.      Comments: Inspection and palpation of the muscles joints and bones of both lower extremities reveal that muscle strength for the anterior, lateral, and posterior muscle groups and intrinsic muscle groups of the foot are all 5 over 5 symmetrical.   Tenderness to the left anterior talofibular ligament and calcaneofibular ligament.  In addition there is tenderness to the anterior medial aspect of the left ankle joint.  Mild tenderness with dorsiflexion and plantar flexion of the left ankle joint.   Feet:      Right foot:      Skin integrity: No skin breakdown or erythema.      Left foot:      Skin integrity: No skin breakdown or erythema.   Skin:     General: Skin is warm, dry and intact.      Nails: There is no clubbing or cyanosis.      Comments: Skin turgor is normal bilaterally. Skin texture is well hydrated to both lower extremities. No lesions or rashes or wounds appreciated bilaterally. Nail plates 1 through 5 bilaterally are within normal limits for length and thickness. No nail clubbing or incurvation noted.   Neurological:      Mental Status: He is alert and oriented to person, place, and time.      Deep Tendon Reflexes: Strength normal.      Reflex Scores:       Patellar reflexes are 2+ on the right side and 2+ on the left side.       Achilles reflexes are 2+ on the right side and 2+ on the left side.     Comments: Sharp,  dull, light touch, vibratory, and proprioceptive sensation are intact bilaterally. Deep tendon reflexes to patellar and Achilles tendon are symmetrical, 2/4 bilaterally. No ankle clonus or Babinski reflexes noted bilaterally.   Diminished light touch noted to the deep and superficial peroneal nerve as well as hyperesthesia.     Psychiatric:         Mood and Affect: Mood and affect normal.         Behavior: Behavior normal.               Assessment:       Encounter Diagnoses   Name Primary?    Osteochondral defect of talus - Left Foot Yes    Ligament rupture - Left Foot     Nerve entrapment of lower limb, left          Plan:       Albin was seen today for ankle injury.    Diagnoses and all orders for this visit:    Osteochondral defect of talus - Left Foot    Ligament rupture - Left Foot    Nerve entrapment of lower limb, left    Other orders  -     LIDOcaine HCL 2% (XYLOCAINE) 2 % jelly; Apply topically as needed. Apply topically once nightly to affected part of foot/feet.  -     pregabalin (LYRICA) 75 MG capsule; Take 1 capsule (75 mg total) by mouth 2 (two) times daily.      I counseled the patient on his conditions, their implications and medical management.      The nature of the condition, options for management, as well as potential risks and complications were discussed in detail with patient. Patient was amenable to my recommendations and left my office fully informed and will follow up as instructed or sooner if necessary.      Independent visualization of imaging was performed.  Results were reviewed in detail with patient.    Patient declined nerve block today as he feels this is improving some.  He will begin topical lidocaine as well as oral Lyrica.  We discussed possible nerve decompression procedure if in with patient undergoes osteochondral defect procedure with [facility].  .

## 2022-01-03 ENCOUNTER — TELEPHONE (OUTPATIENT)
Dept: PODIATRY | Facility: CLINIC | Age: 42
End: 2022-01-03
Payer: OTHER MISCELLANEOUS

## 2022-01-03 ENCOUNTER — CLINICAL SUPPORT (OUTPATIENT)
Dept: REHABILITATION | Facility: HOSPITAL | Age: 42
End: 2022-01-03
Payer: OTHER MISCELLANEOUS

## 2022-01-03 DIAGNOSIS — R26.2 DIFFICULTY IN WALKING INVOLVING ANKLE AND FOOT JOINT: ICD-10-CM

## 2022-01-03 DIAGNOSIS — M25.672 ANKLE STIFFNESS, LEFT: ICD-10-CM

## 2022-01-03 DIAGNOSIS — M25.572 ACUTE LEFT ANKLE PAIN: ICD-10-CM

## 2022-01-03 PROCEDURE — 97110 THERAPEUTIC EXERCISES: CPT | Mod: PN

## 2022-01-03 NOTE — PROGRESS NOTES
"  [employer] Physical Therapy Daily Treatment Note     Name: Albin Barfield  Clinic Number: [id number]    Therapy Diagnosis:   Encounter Diagnoses   Name Primary?    Acute left ankle pain     Ankle stiffness, left     Difficulty in walking involving ankle and foot joint      Physician: Srinivas Zarco NP    Visit Date: 1/3/2022    Physician Orders: PT Eval and Treat   Medical Diagnosis from Referral: S82.62XA (ICD-10-CM) - Closed avulsion fracture of lateral malleolus of left fibula, initial encounter   Evaluation Date: 10/27/2021  Authorization Period Expiration: 10/15/2022   Plan of Care Expiration: 1/4/2021, updated POC today  Visit # / Visits authorized: 12/12, 9/12  FOTO: 5/5, 5/5, 5/5, 3/5   PTA: 0/5  (# of No Show Appts 0/Number of Cancelled Appts 0)    Time In: 1245pm  Time Out: 145pm  Total Appointment Time (timed & untimed codes): 60 minutes (4TE)    Precautions: Standard    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  At this point his syndesmosis does look stable.  Continue conservative treatment. tall Cam boot with ambulation. Remove for daily ROMAT and hygiene. Pain mangement: started on multimodal approach to pain control Also added gabapentin which he will titrate up and he is to take OTC vitamin C. RICE to reduce pain and swelling.  I will place an order for PT. Patient is to make appointment himself.  Will monitor for development of CRPS.  I will have him follow up in clinic in 2 weeks time with standing x-rays of the left ankle to again evaluate the syndesmosis. RTW: at this time he is unable to RTW."    Subjective     Pt reports: that his calf was sore after last visit and feels he has been in more pain when leaving therapy over the past week of so. " Feels overall as though his mobility has improved a little and he is walking better, though still with significant nerve related pain and unable to put on a shoe at this time. He now has a brace he is able to wear, however, still cannot don shoes or sandal style shoes due to hypersensitivity. He does though report overall global hypersensitivity of the lower leg is improved in terms of location, not as widespread and now is focal; however, still very intense from (points to the ATFL region and lateral mallelous) to the top of the foot. Has been keeping up with desensitization activities, but if too long will flare up his s/s for hours and can even get nauseated.    He was partially compliant with home exercise program.  Response to previous treatment: increase ankle pain   Functional change: reports ambulating much better overall    Pain: 3-4/10 upon waking; easily elevates to 8/10   Location: lateral malleolus; plantar and dorsum of L foot > posterior ankle     Objective/Treatment     Observations: Pt to clinic with unilateral axillary crutch and sock in place. Pt with full weight bearing to clinic with B axillary crutches though able to ambulate without crutches. Ambulation with marked improvements overall with much improved weight acceptance, step length; still with late heel off and 4-5 toe sign of the L LE.      Palpation: pt with hypersensitivity to the lateral malleolus, ATFL region through CF region to the dorsum of the foot. Can now palpate the pt's G/S region through Achilles with no increase in s/s as well as can now palpate the metatarsals through the plantar aspect of the foot with no increase in s/s.      Capillary refill (pt applied pressure): rebounds within 3 seconds     Ankle AROM Left (*) = in degrees   Dorsiflexion -4, marked pain > 8 currently  Closed chain 11*!!! With anterior ankle impingement    Plantarflexion 44 > 55 currently   Inversion 19, marked pain > 31 currently   Eversion 3, marked  "pain > 8 currently   Great toe extension 52, mod pain > 70 currently    Ankle PROM Left (*) = in degrees   Dorsiflexion 0, marked pain > 12 currently    Plantarflexion 55 currently      Ankle MMT NT today as the patient is with severe tenderness to palpation. Also, pt with ROM that is not currently WNL therefore, 3-/5 max score in all planes.      Circumferential measures Right in cm Left in cm   At [place] 25.2 27.5 > 26.5cm currently   At Mid Calf: L 36.3 vs R 36.3     Functional Job Specific Testing:    - SLS 11" L LE last measured vs currently with 22" max today eyes open; eyes closed 7 seconds    Job Specific Task Job Demands Current Ability Deficit? (Yes or No)   1. Walking Frequent per pt Unable Yes   2. Balancing Constant per pt Unable Yes   3. Lifting, Pulling, Pushing Heavy PDL per pt Unable Yes      Limitation/Restriction for FOTO Ankle Survey     Therapist reviewed FOTO scores for Albin Barfield on 10/27/2021.   FOTO documents entered into [employer] - see Media section.     Limitation Score: 70%  Goal: 35%  Today: 60%         Albin received the following treatment:    Albin received therapeutic exercises to develop strength, ROM and flexibility for 60 minutes including testing above and treatment activities below:  Bold = performed  Blue = initiated, new activity for today    Sitting:  ABCs in sittinx   Ankle 4 way BTB: 2x15   Single leg bridging 2x10 L LE  SLR 2x10 L LE  Peroneal glides grade 2 long sitting x25; grade 2     Toe scrunches on towel: x30  Martin pick ups: 2 min (picked up 5 marbles)    Standing:  Fitter DF/PF x2' (difficulty with max PF)  Fitter IN/EV x2'  Round fitter cw/ccw 2 min/each direction (NP due to pain in toe)   Lateral weight shifting to SLS to max x5: max 11"  Self MWM fwd lunges to 2nd step of staircase x10 5" hold   Step overs with L LE in closed chain throughout on Airex pad 2x10  Lateral step up and over to Airex pad 2x10  Heel raises x10 from flat ground, x10 " "with calcaneus propped up, x10 with great toe propped up (working on closed chain GTExt)  Toe raises x10 with starting into GT extension  Stationary marching x15 with 5 sec holds     Step through with BUE support with focus on proper push off at terminal contact, hip/knee flexion in swing and heel strike at initial contact  Standing fitter calf stretch 30" x3 bias soleus   1/2 foam roll (flat side up) single limb stance L LE 30" balance x2  Toe taps on 6 inch step with BUE support: 1x10/side  Step up and through 6 inch step with LUE support: 2x15/side    Single leg static standing on LLE on stable surface:2x30 sec   Static standing  on LLE on Airex pad: 2x30 sec  Static standing on round side of BOSU with BUE support: 20 sec   Lateral weight shifting on round side of BOSU with BUE support: 45 sec (reported lateral ankle pain)  Sit to stand from standard chair with verbal cues for equal weight distribution: 2x10  Step downs on 6" step with BUE support, LLE only: 1x10  Standing L SLS with ball ant to posterior glides with R LE 2x10    Albin received the following manual therapy techniques for 0 minutes, including:    Home Exercises Provided and Patient Education Provided  Education provided: Pt was educated on HEP and on all therapeutic exercises initiated during today's tx visit.      Written Home Exercises Provided: yes  Exercises were reviewed and Albin was able to demonstrate them prior to the end of the session.  Albin demonstrated good  understanding of the education provided.     See EMR under Patient Instructions for exercises provided 10/27/2021 and 10/29/2021.    Assessment   Albin is a 41 y.o. male referred to outpatient Physical Therapy with a medical diagnosis of closed avulsion fracture of lateral malleolus of left fibula.     Pt ha snow been seen for 21 of 24 authorized treatment sessions. Since his last progress note the patient has obtained a MRI which revealed the followin. Complete " "tear of anterior talofibular ligament and sprain of calcaneofibular ligament.  2. Probable osteochondral injury of lateral talar dome.  Moderate joint effusion.  3. Heterogeneous signal involving the deltoid ligament, possible sprain or contusion.  4. Osseous contusion of medial talus and medial malleolus.  5. Os trigonum with marrow edema at the synchondrosis.    With objective screening today, the pt has made good progress in terms of improved ROM, improved SLS tolerance, improved SLS balance, and improved gait pattern. He has also noted a moderate amount of lessening of hypersensitivity through the lower leg. Though this is the case he continues to report debilitating nerve related pain from the lateral malleolus/ATFL region through the dorsum of the foot with touch (light, dull, or more aggressive). He is now able to tolerate wearing a sock and a lace up brace, though still unable to tolerate proper footwear (shoe or prior RX CAM boot). Pt has also made marked improvements in weight acceptance and therefore his gait pattern. He reports to therapy with B axillary crutches, though able to ambulate within the clinic with no AD. Still with noted gait impairments including late heel off and 4-5 toe sign through stance phase. He also presents with anterior ankle impingement with closed chain DF as expected with above comments.     Furthermore, he has a visit scheduled with order on 1/14 with Ortho provider Mitali Hager for potential surgical consult. Per Dr. Huertas's note, "We discussed possible nerve decompression procedure if in with patient undergoes osteochondral defect procedure with [facility]."     Plan to continue skilled PT intervention through authorization period and discussed with the pt today current rehab goals and s/s management. Discussed change in response to PT appointments over the past week as the pt reports that he is noticing increase in s/s afterwards which he really did not have as " much before. Will continue to monitor and adjust. Appropriate timing as the pt will be f/u with Ortho in 10 days.     Pt will benefit from additional PT intervention to focus on continuing to improve ankle/foot mobility, strength, hypersensitivity, and improve weight bearing, weight acceptance, gait cycle, and functional strengthening.     Pt prognosis is Good.      Albin is progressing well towards his goals.   Pt prognosis is Good.     Pt will continue to benefit from skilled Physical Therapy interventions in order to address the deficits listed in the problem list box on initial evaluation, provide education, and to address the musculoskeletal limitations and work-related functional deficits for their job as a .    Pt's spiritual, cultural and educational needs considered and pt agreeable to plan of care and goals.     Anticipated barriers to physical therapy: acuity of current injury and marked irritability and severity of s/s    Goals:     Short Term Goals: 8 weeks   1.) Pt will become compliant and independent with his initial HEP to promote decreased pain and improved mobility and strength. MET  2.) Pt will become compliant and independent with an updated, progressed HEP to promote decreased pain and improved mobility and strength as well as prep for discharge from further PT intervention. Ongoing  3.) Pt to report decrease in pain levels from 8/10 at worse to 3/10 at worse. Ongoing  4.) Pt will improve his FOTO score from 60% impairment to 35% improvement to indicate improvement in overall function.  Ongoing  5.) Pt will demonstrate full L ankle AROM in all planes with no c/o s/s to improve general mobility. Improving, Ongoing  6.) Pt will demonstrate ability to carry 50lbs x100 ft within the clinic in order to simulate RTW goals. Ongoing  7.) Pt to improve L LE MMT scores to 5/5 within both flexion and extension. Improving, Ongoing  8.) Pt will demonstrate ability to walk 10 minutes in 1 bout  on treadmill in clinic in order to simulate RTW goals. Ongoing  9.) Pt able to return to prior exercise routine with little to no c/o increased R knee pain. Ongoing     Pt will return to work full duty, full time.      Plan   See updated POC    Maximino Blake, PT   1/3/2022

## 2022-01-03 NOTE — TELEPHONE ENCOUNTER
----- Message from Elly Fitzgerald sent at 1/3/2022  3:04 PM CST -----  Regarding: pt  Pt is calling to check the status of his refill for his medication for lyrica can you please call pt at [phone].    ADARSH

## 2022-01-03 NOTE — TELEPHONE ENCOUNTER
Spoke to pt and informed him that his Rx was sent on 12/28 and informed him that we have a pharmacy receipt. Pt verbalized understanding and call ended.     Spoke to pt pharmacy called in rx again and faxed them the pharmacy receipt.

## 2022-01-04 NOTE — PLAN OF CARE
"  Outpatient Therapy Updated Plan of Care     Visit Date: 1/3/2022    Name: Albin Barfield  Clinic Number: [id number]    Therapy Diagnosis:   Encounter Diagnoses   Name Primary?    Acute left ankle pain     Ankle stiffness, left     Difficulty in walking involving ankle and foot joint      Physician: Srinivas Zarco, NP    Physician Orders: PT Eval and Treat   Medical Diagnosis from Referral: S82.62XA (ICD-10-CM) - Closed avulsion fracture of lateral malleolus of left fibula, initial encounter   Evaluation Date: 10/27/2021  Authorization Period Expiration: 10/15/2022   Plan of Care Expiration: 1/4/2021, updated POC today  Visit # / Visits authorized: 12/12, 9/12  FOTO: 5/5, 5/5, 5/5, 3/5   PTA: 0/5  (# of No Show Appts 0/Number of Cancelled Appts 0)    Time In: 1245pm  Time Out: 145pm  Total Appointment Time (timed & untimed codes): 60 minutes (4TE)    Precautions: Standard    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  At this point his syndesmosis does look stable.  Continue conservative treatment. tall Cam boot with ambulation. Remove for daily ROMAT and hygiene. Pain mangement: started on multimodal approach to pain control Also added gabapentin which he will titrate up and he is to take OTC vitamin C. RICE to reduce pain and swelling.  I will place an order for PT. Patient is to make appointment himself.  Will monitor for development of CRPS.  I will have him follow up in clinic in 2 weeks time with standing x-rays of the left ankle to again evaluate the syndesmosis. RTW: at this time he is unable to RTW."    Subjective     Update:   Pt reports: that his calf was sore after last visit and feels he has been in more pain when leaving therapy over the past week of so. Feels overall " as though his mobility has improved a little and he is walking better, though still with significant nerve related pain and unable to put on a shoe at this time. He now has a brace he is able to wear, however, still cannot don shoes or sandal style shoes due to hypersensitivity. He does though report overall global hypersensitivity of the lower leg is improved in terms of location, not as widespread and now is focal; however, still very intense from (points to the ATFL region and lateral mallelous) to the top of the foot. Has been keeping up with desensitization activities, but if too long will flare up his s/s for hours and can even get nauseated.    He was partially compliant with home exercise program.  Response to previous treatment: increase ankle pain   Functional change: reports ambulating much better overall    Pain: 3-4/10 upon waking; easily elevates to 8/10   Location: lateral malleolus; plantar and dorsum of L foot > posterior ankle     Objective     Update:   Observations: Pt to clinic with unilateral axillary crutch and sock in place. Pt with full weight bearing to clinic with B axillary crutches though able to ambulate without crutches. Ambulation with marked improvements overall with much improved weight acceptance, step length; still with late heel off and 4-5 toe sign of the L LE.      Palpation: pt with hypersensitivity to the lateral malleolus, ATFL region through CF region to the dorsum of the foot. Can now palpate the pt's G/S region through Achilles with no increase in s/s as well as can now palpate the metatarsals through the plantar aspect of the foot with no increase in s/s.      Capillary refill (pt applied pressure): rebounds within 3 seconds     Ankle AROM Left (*) = in degrees   Dorsiflexion -4, marked pain > 8 currently  Closed chain 11*!!! With anterior ankle impingement    Plantarflexion 44 > 55 currently   Inversion 19, marked pain > 31 currently   Eversion 3, marked pain > 8  "currently   Great toe extension 52, mod pain > 70 currently    Ankle PROM Left (*) = in degrees   Dorsiflexion 0, marked pain > 12 currently    Plantarflexion 55 currently      Ankle MMT NT today as the patient is with severe tenderness to palpation. Also, pt with ROM that is not currently WNL therefore, 3-/5 max score in all planes.      Circumferential measures Right in cm Left in cm   At [place] 25.2 27.5 > 26.5cm currently   At Mid Calf: L 36.3 vs R 36.3     Functional Job Specific Testing:    - SLS 11" L LE last measured vs currently with 22" max today eyes open; eyes closed 7 seconds    Job Specific Task Job Demands Current Ability Deficit? (Yes or No)   1. Walking Frequent per pt Unable Yes   2. Balancing Constant per pt Unable Yes   3. Lifting, Pulling, Pushing Heavy PDL per pt Unable Yes      Limitation/Restriction for FOTO Ankle Survey     Therapist reviewed FOTO scores for Albin Barfield on 10/27/2021.   FOTO documents entered into [employer] - see Media section.     Limitation Score: 70%  Goal: 35%  Today: 60%           Assessment     Update:   Albin is a 41 y.o. male referred to outpatient Physical Therapy with a medical diagnosis of closed avulsion fracture of lateral malleolus of left fibula.     Pt ha snow been seen for 21 of 24 authorized treatment sessions. Since his last progress note the patient has obtained a MRI which revealed the followin. Complete tear of anterior talofibular ligament and sprain of calcaneofibular ligament.  2. Probable osteochondral injury of lateral talar dome.  Moderate joint effusion.  3. Heterogeneous signal involving the deltoid ligament, possible sprain or contusion.  4. Osseous contusion of medial talus and medial malleolus.  5. Os trigonum with marrow edema at the synchondrosis.    With objective screening today, the pt has made good progress in terms of improved ROM, improved SLS tolerance, improved SLS balance, and improved gait pattern. He has also " "noted a moderate amount of lessening of hypersensitivity through the lower leg. Though this is the case he continues to report debilitating nerve related pain from the lateral malleolus/ATFL region through the dorsum of the foot with touch (light, dull, or more aggressive). He is now able to tolerate wearing a sock and a lace up brace, though still unable to tolerate proper footwear (shoe or prior RX CAM boot). Pt has also made marked improvements in weight acceptance and therefore his gait pattern. He reports to therapy with B axillary crutches, though able to ambulate within the clinic with no AD. Still with noted gait impairments including late heel off and 4-5 toe sign through stance phase. He also presents with anterior ankle impingement with closed chain DF as expected with above comments.     Furthermore, he has a visit scheduled with order on 1/14 with Ortho provider Mitali Hager for potential surgical consult. Per Dr. Huertas's note, "We discussed possible nerve decompression procedure if in with patient undergoes osteochondral defect procedure with [facility]."     Plan to continue skilled PT intervention through authorization period and discussed with the pt today current rehab goals and s/s management. Discussed change in response to PT appointments over the past week as the pt reports that he is noticing increase in s/s afterwards which he really did not have as much before. Will continue to monitor and adjust. Appropriate timing as the pt will be f/u with Ortho in 10 days.     Pt will benefit from additional PT intervention to focus on continuing to improve ankle/foot mobility, strength, hypersensitivity, and improve weight bearing, weight acceptance, gait cycle, and functional strengthening.     Short Term Goals: 8 weeks   1.) Pt will become compliant and independent with his initial HEP to promote decreased pain and improved mobility and strength. MET  2.) Pt will become compliant and " independent with an updated, progressed HEP to promote decreased pain and improved mobility and strength as well as prep for discharge from further PT intervention. Ongoing  3.) Pt to report decrease in pain levels from 8/10 at worse to 3/10 at worse. Ongoing  4.) Pt will improve his FOTO score from 60% impairment to 35% improvement to indicate improvement in overall function.  Ongoing  5.) Pt will demonstrate full L ankle AROM in all planes with no c/o s/s to improve general mobility. Improving, Ongoing  6.) Pt will demonstrate ability to carry 50lbs x100 ft within the clinic in order to simulate RTW goals. Ongoing  7.) Pt to improve L LE MMT scores to 5/5 within both flexion and extension. Improving, Ongoing  8.) Pt will demonstrate ability to walk 10 minutes in 1 bout on treadmill in clinic in order to simulate RTW goals. Ongoing  9.) Pt able to return to prior exercise routine with little to no c/o increased R knee pain. Ongoing     Pt will return to work full duty, full time.    Long Term Goal Status:   continue per initial plan of care.  Reasons for Recertification of Therapy:   Ongoing impairments and functional deficits    Plan     Updated Certification Period: 1/3/2022 to 1/14/2022  Recommended Treatment Plan: 3 times per week for 2 weeks: Gait Training, Manual Therapy, Moist Heat/ Ice, Neuromuscular Re-ed, Patient Education, Self Care, Therapeutic Activities and Therapeutic Exercise    Maximino Blake, PT  1/3/2022      I CERTIFY THE NEED FOR THESE SERVICES FURNISHED UNDER THIS PLAN OF TREATMENT AND WHILE UNDER MY CARE    Physician's comments:        Physician's Signature: ___________________________________________________

## 2022-01-07 ENCOUNTER — CLINICAL SUPPORT (OUTPATIENT)
Dept: REHABILITATION | Facility: HOSPITAL | Age: 42
End: 2022-01-07
Payer: OTHER MISCELLANEOUS

## 2022-01-07 DIAGNOSIS — R26.2 DIFFICULTY IN WALKING INVOLVING ANKLE AND FOOT JOINT: ICD-10-CM

## 2022-01-07 DIAGNOSIS — M25.572 ACUTE LEFT ANKLE PAIN: ICD-10-CM

## 2022-01-07 DIAGNOSIS — M25.672 ANKLE STIFFNESS, LEFT: ICD-10-CM

## 2022-01-07 PROCEDURE — 97110 THERAPEUTIC EXERCISES: CPT | Mod: PN

## 2022-01-07 NOTE — PROGRESS NOTES
"  Workers' Compensation Physical Therapy Daily Treatment Note     Name: Albin Barfield  Clinic Number: [id number]    Therapy Diagnosis:   Encounter Diagnoses   Name Primary?    Acute left ankle pain     Ankle stiffness, left     Difficulty in walking involving ankle and foot joint      Physician: Srinivas Zarco NP    Visit Date: 1/7/2022    Physician Orders: PT Eval and Treat   Medical Diagnosis from Referral: S82.62XA (ICD-10-CM) - Closed avulsion fracture of lateral malleolus of left fibula, initial encounter   Evaluation Date: 10/27/2021  Authorization Period Expiration: 10/15/2022   Plan of Care Expiration: 1/14/2022  Visit # / Visits authorized: 12/12, 10/12  FOTO: 5/5, 5/5, 5/5, 4/5   PTA: 0/5  (# of No Show Appts 0/Number of Cancelled Appts 0)    Time In: 1245pm  Time Out: 145pm  Total Appointment Time (timed & untimed codes): 60 minutes (4TE)    Precautions: Standard    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  At this point his syndesmosis does look stable.  Continue conservative treatment. tall Cam boot with ambulation. Remove for daily ROMAT and hygiene. Pain mangement: started on multimodal approach to pain control Also added gabapentin which he will titrate up and he is to take OTC vitamin C. RICE to reduce pain and swelling.  I will place an order for PT. Patient is to make appointment himself.  Will monitor for development of CRPS.  I will have him follow up in clinic in 2 weeks time with standing x-rays of the left ankle to again evaluate the syndesmosis. RTW: at this time he is unable to RTW."    Subjective     Pt reports: that his calf was sore after last visit and feels he has been in more pain when leaving therapy over the past week of so. Feels overall as " "though his mobility has improved a little and he is walking better, though still with significant nerve related pain and unable to put on a shoe at this time. He now has a brace he is able to wear, however, still cannot don shoes or sandal style shoes due to hypersensitivity. He does though report overall global hypersensitivity of the lower leg is improved in terms of location, not as widespread and now is focal; however, still very intense from (points to the ATFL region and lateral mallelous) to the top of the foot. Has been keeping up with desensitization activities, but if too long will flare up his s/s for hours and can even get nauseated.    He was partially compliant with home exercise program.  Response to previous treatment: increase ankle pain   Functional change: reports ambulating much better overall    Pain: 3-4/10 upon waking; easily elevates to 8/10   Location: lateral malleolus; plantar and dorsum of L foot > posterior ankle     Objective/Treatment     Albin received the following treatment:    Albin received therapeutic exercises to develop strength, ROM and flexibility for 60 minutes including testing above and treatment activities below:  Bold = performed  Blue = initiated, new activity for today    ABCs in sittinx   Ankle 4 way BTB: 2x15   Single leg bridging 2x10 L LE  SLR 2x10 L LE  Prone plank initiation 2x10  Peroneal glides grade 2 long sitting x25; grade 2     Toe scrunches on towel: x30  Dawn pick ups: 2 min (picked up 5 marbles)    Fitter DF/PF x2' (difficulty with max PF)  Fitter IN/EV x2'  Round fitter cw/ccw 2 min/each direction (NP due to pain in toe)   Lateral weight shifting to SLS to max x5: max 11"  Self MWM fwd lunges to 2nd step of staircase x10 5" hold   Step overs with L LE in closed chain throughout on Airex pad 2x10  Lateral step up and over to Airex pad 2x10  Heel raises x10 from flat ground, x10 with calcaneus propped up, x10 with great toe propped up (working " "on closed chain GTExt)  Toe raises x10 with starting into GT extension  Stationary marching x15 with 5 sec holds     Single leg static standing on LLE on stable surface:2x30 sec   Static standing  on LLE on Airex pad: 2x30 sec  Static standing on round side of BOSU with BUE support: 20 sec   Lateral weight shifting on round side of BOSU with BUE support: 45 sec (reported lateral ankle pain)  Sit to stand from standard chair with verbal cues for equal weight distribution: 2x10  Step downs on 6" step with BUE support, LLE only: 1x10  Standing L SLS with ball ant to posterior glides with R LE 2x10    Albin received the following manual therapy techniques for 0 minutes, including:    Home Exercises Provided and Patient Education Provided  Education provided: Pt was educated on HEP and on all therapeutic exercises initiated during today's tx visit.      Written Home Exercises Provided: yes  Exercises were reviewed and Albin was able to demonstrate them prior to the end of the session.  Albin demonstrated good  understanding of the education provided.     See EMR under Patient Instructions for exercises provided 10/27/2021 and 10/29/2021.    Assessment   Albin is a 41 y.o. male referred to outpatient Physical Therapy with a medical diagnosis of closed avulsion fracture of lateral malleolus of left fibula.     Pt ha snow been seen for 23 of 24 authorized treatment sessions. Since his last progress note the patient has obtained a MRI which revealed the followin. Complete tear of anterior talofibular ligament and sprain of calcaneofibular ligament.  2. Probable osteochondral injury of lateral talar dome.  Moderate joint effusion.  3. Heterogeneous signal involving the deltoid ligament, possible sprain or contusion.  4. Osseous contusion of medial talus and medial malleolus.  5. Os trigonum with marrow edema at the synchondrosis.    With objective screening last visit, it was noted that the pt has made good " "progress in terms of improved ROM, improved SLS tolerance, improved SLS balance, and improved gait pattern. He has also noted a moderate amount of lessening of hypersensitivity through the lower leg. Though this is the case he continues to report debilitating nerve related pain from the lateral malleolus/ATFL region through the dorsum of the foot with touch (light, dull, or more aggressive). He is now able to tolerate wearing a sock and a lace up brace, though still unable to tolerate proper footwear (shoe or prior RX CAM boot). Pt has also made marked improvements in weight acceptance and therefore his gait pattern. He reports to therapy with B axillary crutches, though able to ambulate within the clinic with no AD. Still with noted gait impairments including late heel off and 4-5 toe sign through stance phase. He also presents with anterior ankle impingement with closed chain DF as expected with above comments.     Furthermore, he has a visit scheduled on 1/14 with Ortho provider Mitali Hager for potential surgical consult. Per Dr. Huertas's note, "We discussed possible nerve decompression procedure if in with patient undergoes osteochondral defect procedure with [facility]."     Plan to continue skilled PT intervention through authorization period (1 additional visit) and discussed with the pt today current rehab goals and s/s management. Discussed change in response to PT appointments over the past week as the pt reports that he is noticing increase in s/s afterwards which he really did not have as much before. Will continue to monitor and adjust. Appropriate timing as the pt will be f/u with Ortho next week.     Pt will benefit from additional PT intervention to focus on continuing to improve ankle/foot mobility, strength, hypersensitivity, and improve weight bearing, weight acceptance, gait cycle, and functional strengthening.     Pt prognosis is Good.      Albin is progressing well towards his " goals.   Pt prognosis is Good.     Pt will continue to benefit from skilled Physical Therapy interventions in order to address the deficits listed in the problem list box on initial evaluation, provide education, and to address the musculoskeletal limitations and work-related functional deficits for their job as a .    Pt's spiritual, cultural and educational needs considered and pt agreeable to plan of care and goals.     Anticipated barriers to physical therapy: acuity of current injury and marked irritability and severity of s/s    Goals:     Short Term Goals: 8 weeks   1.) Pt will become compliant and independent with his initial HEP to promote decreased pain and improved mobility and strength. MET  2.) Pt will become compliant and independent with an updated, progressed HEP to promote decreased pain and improved mobility and strength as well as prep for discharge from further PT intervention. Ongoing  3.) Pt to report decrease in pain levels from 8/10 at worse to 3/10 at worse. Ongoing  4.) Pt will improve his FOTO score from 60% impairment to 35% improvement to indicate improvement in overall function.  Ongoing  5.) Pt will demonstrate full L ankle AROM in all planes with no c/o s/s to improve general mobility. Improving, Ongoing  6.) Pt will demonstrate ability to carry 50lbs x100 ft within the clinic in order to simulate RTW goals. Ongoing  7.) Pt to improve L LE MMT scores to 5/5 within both flexion and extension. Improving, Ongoing  8.) Pt will demonstrate ability to walk 10 minutes in 1 bout on treadmill in clinic in order to simulate RTW goals. Ongoing  9.) Pt able to return to prior exercise routine with little to no c/o increased R knee pain. Ongoing     Pt will return to work full duty, full time.      Plan   See updated POC    Maximino Blake, PT   1/7/2022

## 2022-01-14 ENCOUNTER — OFFICE VISIT (OUTPATIENT)
Dept: ORTHOPEDICS | Facility: CLINIC | Age: 42
End: 2022-01-14
Payer: OTHER MISCELLANEOUS

## 2022-01-14 ENCOUNTER — PATIENT MESSAGE (OUTPATIENT)
Dept: ORTHOPEDICS | Facility: CLINIC | Age: 42
End: 2022-01-14
Payer: OTHER MISCELLANEOUS

## 2022-01-14 DIAGNOSIS — S82.62XD CLOSED AVULSION FRACTURE OF LATERAL MALLEOLUS OF LEFT FIBULA WITH ROUTINE HEALING, SUBSEQUENT ENCOUNTER: ICD-10-CM

## 2022-01-14 DIAGNOSIS — S93.402S INVERSION SPRAIN OF ANKLE, LEFT, SEQUELA: Primary | ICD-10-CM

## 2022-01-14 DIAGNOSIS — G57.32 PERONEAL NEURITIS, LEFT: ICD-10-CM

## 2022-01-14 PROCEDURE — 99244 PR OFFICE CONSULTATION,LEVEL IV: ICD-10-PCS | Mod: S$GLB,,, | Performed by: ORTHOPAEDIC SURGERY

## 2022-01-14 PROCEDURE — 99244 OFF/OP CNSLTJ NEW/EST MOD 40: CPT | Mod: S$GLB,,, | Performed by: ORTHOPAEDIC SURGERY

## 2022-01-14 PROCEDURE — 99999 PR PBB SHADOW E&M-EST. PATIENT-LVL II: ICD-10-PCS | Mod: PBBFAC,,, | Performed by: ORTHOPAEDIC SURGERY

## 2022-01-14 PROCEDURE — 99999 PR PBB SHADOW E&M-EST. PATIENT-LVL II: CPT | Mod: PBBFAC,,, | Performed by: ORTHOPAEDIC SURGERY

## 2022-01-14 PROCEDURE — 99212 OFFICE O/P EST SF 10 MIN: CPT | Mod: PBBFAC,PN | Performed by: ORTHOPAEDIC SURGERY

## 2022-01-14 RX ORDER — METHOCARBAMOL 500 MG/1
500 TABLET, FILM COATED ORAL 3 TIMES DAILY
Qty: 42 TABLET | Refills: 0 | Status: SHIPPED | OUTPATIENT
Start: 2022-01-14 | End: 2022-01-28

## 2022-01-14 RX ORDER — OXYCODONE HYDROCHLORIDE 5 MG/1
5 TABLET ORAL EVERY 12 HOURS PRN
Qty: 14 TABLET | Refills: 0 | Status: SHIPPED | OUTPATIENT
Start: 2022-01-14 | End: 2022-01-21

## 2022-01-14 NOTE — PROGRESS NOTES
Subjective:   Chief complaint: Left ankle pain  Referring provider: Dr. Sanket Curtis     HPI:   Albin Barfield is a 41 y.o. male who presents today for evaluation of left ankle pain.  Rates pain as 5/10.  Pain has been ongoing for a few months (sep 2021).  Inciting event: injury;fx and torn ligaments.  Treatments tried: PT.    Pain localized to entire lateral ankle and hindfoot.  There are 2 components which he states he can differentiate - nerve pain and ankle pain.  The nerve pain has been getting more intense but narrowing to a smaller area - primarily SPN.  It started as the entire distal leg.  The ankle pain is localized to the lateral joint line and ligaments.    He notes symptoms improved with lyrica.  He has stopped therapy due to anticipation of need for surgery.    Does the patient use tobacco products? Yes  If so, what and how often? A pack a day    ROS:  Musculoskeletal: per HPI  Constitutional: Negative for fever  Cardiovascular: Negative for chest pain  Respiratory: Negative for shortness of breath  Skin: Negative for ulcers or lesions  Neurological: Positive for burning, tingling and numbness  Vascular: Positive for peripheral edema  Heme: Negative for blood thinners; Negative for history of blood clot  Endocrine: Negative for diabetes  Immune: Negative for inflammatory arthritis  /Nephrology: Negative for ESRD-on hemodialysis         Objective:   Exam:  There were no vitals filed for this visit.  General: No acute distress, well-appearing  Neurologic: Alert and oriented x3  Psychiatric: Appropriate mood and affect, cooperative  Cardiovascular: Regular pulse  Respiratory: Breathing on room air  Skin: No rashes or ulcers  Vascular: Palpable DP/PT, no skin changes/vasomotor changes to suggest CRPS  Musculoskeletal: Standing examination demonstrates guarded but neutral alignment.  There is no swelling.  There is no ecchymosis.  Medial longitudinal arch is neutral.      Hip exam demonstrates 3+/5  abductor strength.  Cannot maintain single leg balance without falling to left.    Focused exam of the left lower extremity demonstrates significantly guarded ankle range of motion.  Hindfoot is flexible but guarded.  Ankle dorsiflexion is decreased due to pain.  Tilt testing demonstrates pain compared to contralateral side.  Drawer testing demonstrates pain compared to contralateral side.      NT over medial joint.  TTP over lateral joint.  Less so TTP over peroneals with crepitus noted.    4/5 TA/GSC/PTT and 3+/5 peroneals with pain.  SILT SP/DP/PT and able to localize with tinels and dysesthesias over the SPN.    Imaging:  Prior radiographs were independently interpreted by me. and MRI also independently interpretted.    Standing ankle x-rays demonstrate no acute osseous abnormalities.  Small marginal osteophytes seen.  OCD not visualized.    MRI ankle and foot reviewed.  There is ankle effusion and sequela of ankle sprain noted.  Peroneals maintained. There is small amount of edema within the lateral dome (no obvious cartilage cleavage) as well as the medial facet.    Additional records/labs reviewed:  Dr. 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      Karla riggs reviewed- Treated expectantly with PT and progression of activities but developed worsening nerve and ankle pain for which MRI was obtained    Dr. Aleksandar riggs reviewed- Patient declined nerve block today as he feels this is improving some.  He will begin topical lidocaine as well as oral Lyrica.  We discussed possible nerve decompression procedure if in with patient undergoes osteochondral defect procedure with [facility].      Assessment:     1. Inversion sprain of ankle, left, sequela    2. Peroneal neuritis, left         Patient is seen for a chronic problem (not at treatment goal) with treatment complicated by neuritis and  "smoking history.    Data: 2 results independently interpreted and 3 prior notes reviewed    Treatment plan: Counseling regarding non-operative treatment and possibility of surgery in future if persistent morbidity from symptoms     I reviewed imaging, clinical history, and diagnosis as above with the patient. I attempted to use layman's terms to educate the patient as well as utilize foot models and/or pictures.   I personally went through imaging with the patient.      I discussed this likely represents the sequela or group of things I call "ankle sprains that don't get better."  I reviewed the variety of processes (intra-articular and extra-articular) that can contribute to ongoing pain and/or dysfunction.  At this point, I discussed the next step is 2 fold.     Discussed diagnostic and therapeutic role of injection by Dr. Huertas.  Patient will consider this.  I think it would be helpful for deciding how much of his pain is coming from the nerve to help with prognosis.    Discussed possible role for stabilization surgery.  Reviewed that ligament reconstruction will improve stability but less reliably pain especially if from neuritis.    Discussed possible role for arthroscopy and microfracture.  I'm not sure there is actual OCD injury vs simply contusion but will evaluate with arthroscopy and treat based on findings.    Discussed surgery WILL NOT be scheduled unless patient quits smoking minimum 1 month preop and 2 months postop.    Tobacco (cigarettes) cessation counseling was provided discussing overall health and specifically musculoskeletal impacts of continued tobacco use/abuse.  I offered tobacco cessation resources to patient, and they declined.  Greater than 3 minutes was spent in discussion and counseling about cutting back and/or cessation.         Plan:       Followup with patient virtually after discussion with Dr. Huertas about diagnostic injection    No orders of the defined types were placed in this " encounter.      No past medical history on file.    No past surgical history on file.    No family history on file.    Social History     Socioeconomic History    Marital status: Single   Tobacco Use    Smoking status: Current Every Day Smoker    Smokeless tobacco: Current User   Social History Narrative    ** Merged History Encounter **

## 2022-01-15 PROBLEM — G57.32 PERONEAL NEURITIS, LEFT: Status: ACTIVE | Noted: 2022-01-15

## 2022-01-18 ENCOUNTER — TELEPHONE (OUTPATIENT)
Dept: NEUROLOGY | Facility: CLINIC | Age: 42
End: 2022-01-18
Payer: OTHER MISCELLANEOUS

## 2022-01-18 ENCOUNTER — PATIENT MESSAGE (OUTPATIENT)
Dept: ORTHOPEDICS | Facility: CLINIC | Age: 42
End: 2022-01-18
Payer: OTHER MISCELLANEOUS

## 2022-01-18 NOTE — TELEPHONE ENCOUNTER
Called pt to schedule neurology appt. Offered 02/09/22at 1pm 7 day release with Dr. Ross. Pt accepted. Will schedule once released.

## 2022-01-21 ENCOUNTER — PATIENT MESSAGE (OUTPATIENT)
Dept: ORTHOPEDICS | Facility: CLINIC | Age: 42
End: 2022-01-21
Payer: OTHER MISCELLANEOUS

## 2022-01-24 ENCOUNTER — TELEPHONE (OUTPATIENT)
Dept: PODIATRY | Facility: CLINIC | Age: 42
End: 2022-01-24
Payer: OTHER MISCELLANEOUS

## 2022-01-24 NOTE — TELEPHONE ENCOUNTER
----- Message from Romaine Ricketts sent at 1/24/2022  4:23 PM CST -----  Regarding: Appt. Access  Contact: patient  Pt. Requesting a call back in regards to scheduling an injection. (W/C).                  Pt.@[phone]

## 2022-01-24 NOTE — TELEPHONE ENCOUNTER
Pt states he's having foot pain. Scheduled him for the next available appointment with Dr. Huertas.

## 2022-01-26 ENCOUNTER — TELEPHONE (OUTPATIENT)
Dept: PODIATRY | Facility: CLINIC | Age: 42
End: 2022-01-26
Payer: OTHER MISCELLANEOUS

## 2022-01-26 ENCOUNTER — TELEPHONE (OUTPATIENT)
Dept: NEUROLOGY | Facility: CLINIC | Age: 42
End: 2022-01-26
Payer: OTHER MISCELLANEOUS

## 2022-01-26 NOTE — TELEPHONE ENCOUNTER
Call placed to patient to verify that he is scheduled for a nerve block at his next appointment.  He relates that the workers comp carrier, [employer], is stating that needs prior approval.  Call place to [employer] romeo status.

## 2022-01-31 ENCOUNTER — TELEPHONE (OUTPATIENT)
Dept: PODIATRY | Facility: CLINIC | Age: 42
End: 2022-01-31
Payer: OTHER MISCELLANEOUS

## 2022-01-31 ENCOUNTER — PATIENT MESSAGE (OUTPATIENT)
Dept: PODIATRY | Facility: CLINIC | Age: 42
End: 2022-01-31
Payer: OTHER MISCELLANEOUS

## 2022-01-31 NOTE — TELEPHONE ENCOUNTER
----- Message from Anika Jacobs sent at 1/31/2022 12:10 PM CST -----  Contact: patient  Pt needs to speak w/ nurse in regards to workers comp being notified of appt today    Call back @[phone]

## 2022-01-31 NOTE — TELEPHONE ENCOUNTER
Request sent to Dr Huertas to enter a referral to himself for injection so the proper authorization can be obtained from workers comp

## 2022-01-31 NOTE — TELEPHONE ENCOUNTER
Spoke with patient and related that it does not appear that we have the authorization from workers comp as of yet.  Message out to Savage Marvin re status.

## 2022-02-03 ENCOUNTER — TELEPHONE (OUTPATIENT)
Dept: PODIATRY | Facility: CLINIC | Age: 42
End: 2022-02-03
Payer: OTHER MISCELLANEOUS

## 2022-02-03 NOTE — TELEPHONE ENCOUNTER
----- Message from Milagros Hernandez sent at 2/3/2022  3:33 PM CST -----  Regarding: pt adviceq  Contact: pt@ [phone]  Pt calling to speak with Dr. Huertas himself regarding his appt, wanting to confirm that it is going through his worker comp. Please call.

## 2022-02-04 ENCOUNTER — PATIENT MESSAGE (OUTPATIENT)
Dept: PODIATRY | Facility: CLINIC | Age: 42
End: 2022-02-04
Payer: OTHER MISCELLANEOUS

## 2022-02-10 ENCOUNTER — PATIENT MESSAGE (OUTPATIENT)
Dept: ORTHOPEDICS | Facility: CLINIC | Age: 42
End: 2022-02-10
Payer: OTHER MISCELLANEOUS

## 2022-02-10 ENCOUNTER — PROCEDURE VISIT (OUTPATIENT)
Dept: PODIATRY | Facility: CLINIC | Age: 42
End: 2022-02-10
Payer: OTHER MISCELLANEOUS

## 2022-02-10 VITALS
WEIGHT: 199.94 LBS | DIASTOLIC BLOOD PRESSURE: 86 MMHG | HEART RATE: 78 BPM | BODY MASS INDEX: 27.89 KG/M2 | SYSTOLIC BLOOD PRESSURE: 132 MMHG

## 2022-02-10 DIAGNOSIS — G57.82 NERVE ENTRAPMENT OF LOWER LIMB, LEFT: Primary | ICD-10-CM

## 2022-02-10 PROCEDURE — 64450 NJX AA&/STRD OTHER PN/BRANCH: CPT | Mod: LT,S$GLB,, | Performed by: PODIATRIST

## 2022-02-10 PROCEDURE — 64450 PR NERVE BLOCK INJ, ANES/STEROID, OTHER PERIPHERAL: ICD-10-PCS | Mod: LT,S$GLB,, | Performed by: PODIATRIST

## 2022-02-12 ENCOUNTER — PATIENT MESSAGE (OUTPATIENT)
Dept: PODIATRY | Facility: CLINIC | Age: 42
End: 2022-02-12
Payer: OTHER MISCELLANEOUS

## 2022-02-14 ENCOUNTER — TELEPHONE (OUTPATIENT)
Dept: ORTHOPEDICS | Facility: CLINIC | Age: 42
End: 2022-02-14
Payer: OTHER MISCELLANEOUS

## 2022-02-14 ENCOUNTER — TELEPHONE (OUTPATIENT)
Dept: PREADMISSION TESTING | Facility: HOSPITAL | Age: 42
End: 2022-02-14
Payer: OTHER MISCELLANEOUS

## 2022-02-14 DIAGNOSIS — Z01.818 PRE-OP TESTING: Primary | ICD-10-CM

## 2022-02-14 NOTE — TELEPHONE ENCOUNTER
----- Message from Chuck Heard sent at 2/14/2022 10:32 AM CST -----  Regarding: PCP Clearance for Surgical Patient - Dr. Hager  Hi,     Would you please reach out and schedule PCP clearance appointment with Dr. Cardenas or either his NPs (Javon Banuelos & Modesto Espinoza) for Dr. Hager surgical patient dos 3/16/22. Per Dr. Hager    Thank you so much,    Chuck Heard, MS, OTC   Sports Medicine Assistant   [facility]

## 2022-02-14 NOTE — TELEPHONE ENCOUNTER
Last cigarette 30 days ago.    CALLED PATIENT: SCHEDULED Left ankle surgery 3/16/22.    Scheduled all appointments including preop, postop and COVID test     Patient does not currently have a PCP on file, he will need to be seen at the pre op center to establish care and surgery optimization.

## 2022-02-15 ENCOUNTER — TELEPHONE (OUTPATIENT)
Dept: ORTHOPEDICS | Facility: CLINIC | Age: 42
End: 2022-02-15
Payer: OTHER MISCELLANEOUS

## 2022-02-15 NOTE — TELEPHONE ENCOUNTER
Spoke with pt    Pt had an appointment with Dr Curtis on 2/22 that was previously scheduled.  Pt is now under the care of Dr Hager    Cancelled pt's appointment with Dr Curtis on 2/22  Pt verbalized understanding

## 2022-02-16 RX ORDER — TRAMADOL HYDROCHLORIDE 50 MG/1
50 TABLET ORAL EVERY 8 HOURS PRN
Qty: 40 TABLET | Refills: 0 | Status: SHIPPED | OUTPATIENT
Start: 2022-02-16 | End: 2022-03-08

## 2022-02-18 NOTE — PROCEDURES
Subjective:      Patient ID: Albin Barfield is a 41 y.o. male.    Chief Complaint: Foot Pain (Left heel injection )    Albin is a 41 y.o. male who presents to the podiatry clinic  with complaint of  left foot pain. Onset of the symptoms was several months ago. Precipitating event: inversion injury to foot. Current symptoms include: inability to bear weight. Aggravating factors: any weight bearing. Symptoms have gradually worsened. Patient has had no prior foot problems. Evaluation to date: Ortho consult: Possible nerve entrapment injury as well as osteochondral lesion per MRI left ankle.. Treatment to date: avoidance of offending activity. Patients rates pain 7/10 on pain scale.  He is here for nerve block today.        Review of Systems   Constitutional: Negative for chills, decreased appetite, fever and malaise/fatigue.   HENT: Negative for congestion, hearing loss, nosebleeds and tinnitus.    Eyes: Negative for double vision, pain, photophobia and visual disturbance.   Cardiovascular: Negative for chest pain, claudication, cyanosis and leg swelling.   Respiratory: Negative for cough, hemoptysis, shortness of breath and wheezing.    Endocrine: Negative for cold intolerance and heat intolerance.   Hematologic/Lymphatic: Negative for adenopathy and bleeding problem.   Skin: Negative for color change, dry skin, itching, nail changes and suspicious lesions.   Musculoskeletal: Positive for joint pain, joint swelling and stiffness. Negative for arthritis and myalgias.   Gastrointestinal: Negative for abdominal pain, jaundice, nausea and vomiting.   Genitourinary: Negative for dysuria, frequency and hematuria.   Neurological: Positive for numbness, paresthesias and sensory change. Negative for difficulty with concentration and loss of balance.   Psychiatric/Behavioral: Negative for altered mental status, hallucinations and suicidal ideas. The patient is not nervous/anxious.    Allergic/Immunologic: Negative for  environmental allergies and persistent infections.           Objective:      Physical Exam  Constitutional:       Appearance: He is well-developed.   HENT:      Head: Normocephalic and atraumatic.   Cardiovascular:      Pulses:           Dorsalis pedis pulses are 2+ on the right side and 2+ on the left side.        Posterior tibial pulses are 2+ on the right side and 2+ on the left side.   Pulmonary:      Effort: Pulmonary effort is normal.   Musculoskeletal:      Right foot: Normal range of motion. No deformity.      Left foot: Normal range of motion. No deformity.      Comments: Inspection and palpation of the muscles joints and bones of both lower extremities reveal that muscle strength for the anterior, lateral, and posterior muscle groups and intrinsic muscle groups of the foot are all 5 over 5 symmetrical.   Tenderness to the left anterior talofibular ligament and calcaneofibular ligament.  In addition there is tenderness to the anterior medial aspect of the left ankle joint.  Mild tenderness with dorsiflexion and plantar flexion of the left ankle joint.   Feet:      Right foot:      Skin integrity: No skin breakdown or erythema.      Left foot:      Skin integrity: No skin breakdown or erythema.   Skin:     General: Skin is warm and dry.      Nails: There is no clubbing.      Comments: Skin turgor is normal bilaterally. Skin texture is well hydrated to both lower extremities. No lesions or rashes or wounds appreciated bilaterally. Nail plates 1 through 5 bilaterally are within normal limits for length and thickness. No nail clubbing or incurvation noted.   Neurological:      Mental Status: He is alert and oriented to person, place, and time.      Deep Tendon Reflexes:      Reflex Scores:       Patellar reflexes are 2+ on the right side and 2+ on the left side.       Achilles reflexes are 2+ on the right side and 2+ on the left side.     Comments: Sharp, dull, light touch, vibratory, and proprioceptive  sensation are intact bilaterally. Deep tendon reflexes to patellar and Achilles tendon are symmetrical, 2/4 bilaterally. No ankle clonus or Babinski reflexes noted bilaterally.   Diminished light touch noted to the deep and superficial peroneal nerve as well as hyperesthesia.     Psychiatric:         Behavior: Behavior normal.               Assessment:       Encounter Diagnosis   Name Primary?    Nerve entrapment of lower limb, left Yes         Plan:       Albin was seen today for foot pain.    Diagnoses and all orders for this visit:    Nerve entrapment of lower limb, left    Other orders  -     traMADoL (ULTRAM) 50 mg tablet; Take 1 tablet (50 mg total) by mouth every 8 (eight) hours as needed for Pain.      I counseled the patient on his conditions, their implications and medical management.      The nature of the condition, options for management, as well as potential risks and complications were discussed in detail with patient. Patient was amenable to my recommendations and left my office fully informed and will follow up as instructed or sooner if necessary.      Independent visualization of imaging was performed.  Results were reviewed in detail with patient.    The area overlying the left superficial deep peroneal nerve(s) was sterilely prepped, verbal consent was obtained, 2 cc's of 0.5% Marcaine plain was infiltrated around the affected nerve(s) for a diagnostic nerve block.  This was well tolerated with no complications.    Discussed options for peripheral neuropathy/nerve entrapment syndrome including nerve block therapy, surgical nerve entrapment decompression procedures, and various vitamins and supplementation available shown to improve nerve function.

## 2022-02-22 ENCOUNTER — PATIENT MESSAGE (OUTPATIENT)
Dept: ORTHOPEDICS | Facility: CLINIC | Age: 42
End: 2022-02-22
Payer: OTHER MISCELLANEOUS

## 2022-02-25 ENCOUNTER — TELEPHONE (OUTPATIENT)
Dept: ORTHOPEDICS | Facility: CLINIC | Age: 42
End: 2022-02-25
Payer: OTHER MISCELLANEOUS

## 2022-02-25 NOTE — TELEPHONE ENCOUNTER
Spoke to pt and informed him we did not receive anything and gave him the fax number to give to his .----- Message from Bacilio Chacon sent at 2/25/2022 11:20 AM CST -----  Contact: Patient  The pt called ad would like to have someone call him back who can talk to him about workers comp    The pt can be reached at [phone]

## 2022-02-28 ENCOUNTER — PATIENT MESSAGE (OUTPATIENT)
Dept: ORTHOPEDICS | Facility: CLINIC | Age: 42
End: 2022-02-28
Payer: OTHER MISCELLANEOUS

## 2022-03-02 DIAGNOSIS — G57.32 PERONEAL NEURITIS, LEFT: ICD-10-CM

## 2022-03-02 DIAGNOSIS — S82.62XD CLOSED AVULSION FRACTURE OF LATERAL MALLEOLUS OF LEFT FIBULA WITH ROUTINE HEALING, SUBSEQUENT ENCOUNTER: Primary | ICD-10-CM

## 2022-03-02 DIAGNOSIS — M93.272 OSTEOCHONDRITIS DISSECANS OF ANKLE, LEFT: ICD-10-CM

## 2022-03-08 ENCOUNTER — NURSE TRIAGE (OUTPATIENT)
Dept: ADMINISTRATIVE | Facility: CLINIC | Age: 42
End: 2022-03-08
Payer: OTHER MISCELLANEOUS

## 2022-03-08 ENCOUNTER — OFFICE VISIT (OUTPATIENT)
Dept: ORTHOPEDICS | Facility: CLINIC | Age: 42
End: 2022-03-08
Payer: OTHER MISCELLANEOUS

## 2022-03-08 VITALS — BODY MASS INDEX: 27.99 KG/M2 | HEIGHT: 71 IN | WEIGHT: 199.94 LBS

## 2022-03-08 DIAGNOSIS — S82.62XD CLOSED AVULSION FRACTURE OF LATERAL MALLEOLUS OF LEFT FIBULA WITH ROUTINE HEALING, SUBSEQUENT ENCOUNTER: ICD-10-CM

## 2022-03-08 DIAGNOSIS — G57.32 PERONEAL NEURITIS, LEFT: ICD-10-CM

## 2022-03-08 DIAGNOSIS — G89.18 POST-OP PAIN: ICD-10-CM

## 2022-03-08 DIAGNOSIS — M93.272 OSTEOCHONDRITIS DISSECANS OF ANKLE, LEFT: ICD-10-CM

## 2022-03-08 DIAGNOSIS — Z01.818 PRE-OP EVALUATION: Primary | ICD-10-CM

## 2022-03-08 PROCEDURE — 99999 PR PBB SHADOW E&M-EST. PATIENT-LVL III: CPT | Mod: PBBFAC,,, | Performed by: PHYSICIAN ASSISTANT

## 2022-03-08 PROCEDURE — 99999 PR PBB SHADOW E&M-EST. PATIENT-LVL III: ICD-10-PCS | Mod: PBBFAC,,, | Performed by: PHYSICIAN ASSISTANT

## 2022-03-08 PROCEDURE — 99499 UNLISTED E&M SERVICE: CPT | Mod: S$GLB,,, | Performed by: PHYSICIAN ASSISTANT

## 2022-03-08 PROCEDURE — 99499 NO LOS: ICD-10-PCS | Mod: S$GLB,,, | Performed by: PHYSICIAN ASSISTANT

## 2022-03-08 RX ORDER — DULOXETIN HYDROCHLORIDE 30 MG/1
30 CAPSULE, DELAYED RELEASE ORAL DAILY
Qty: 31 CAPSULE | Refills: 0 | Status: SHIPPED | OUTPATIENT
Start: 2022-03-08 | End: 2022-04-08

## 2022-03-08 RX ORDER — HYDROCODONE BITARTRATE AND ACETAMINOPHEN 5; 325 MG/1; MG/1
1 TABLET ORAL EVERY 12 HOURS PRN
Qty: 16 TABLET | Refills: 0 | Status: SHIPPED | OUTPATIENT
Start: 2022-03-08 | End: 2022-03-16

## 2022-03-08 NOTE — PROGRESS NOTES
Patient having an increase in pain since the n. block which is to be expected, but the tramadol that he was prescribed is not working.     POC per Dr. Hager:    1. rx for cymbalta and norco to help with pain.       I also sent a message to Paty Romo to get an update for his surgery authorization as he is work comp.    Patient is approved for appointments to see our clinic through work compensation so all appointments should be approved, other than surgery.     Patient was pleasant at the end of the conversation and apologized for him cursing at our office earlier.

## 2022-03-08 NOTE — TELEPHONE ENCOUNTER
[place]    PCP:  Dr. Ronald Ospina with [facility] calling with questions re:  [place].  Pharmacy needs ICD code for Norco.  NOC was able to locate ICD codes in EMR and provide pharmacy with the codes.  Information only call.    Reason for Disposition   Pharmacy calling with prescription question and triager answers question    Additional Information   Negative: [1] Intentional drug overdose AND [2] suicidal thoughts or ideas   Negative: MORE THAN A DOUBLE DOSE of a prescription or over-the-counter (OTC) drug   Negative: [1] DOUBLE DOSE (an extra dose or lesser amount) of prescription drug AND [2] any symptoms (e.g., dizziness, nausea, pain, sleepiness)   Negative: [1] DOUBLE DOSE (an extra dose or lesser amount) of over-the-counter (OTC) drug AND [2] any symptoms (e.g., dizziness, nausea, pain, sleepiness)   Negative: Took another person's prescription drug   Negative: [1] DOUBLE DOSE (an extra dose or lesser amount) of prescription drug AND [2] NO symptoms (Exception: a double dose of antibiotics)   Negative: Diabetes drug error or overdose (e.g., took wrong type of insulin or took extra dose)   Negative: [1] Prescription refill request for ESSENTIAL medicine (i.e., likelihood of harm to patient if not taken) AND [2] triager unable to refill per department policy   Negative: [1] Prescription not at pharmacy AND [2] was prescribed by PCP recently (Exception: triager has access to EMR and prescription is recorded there. Go to Home Care and confirm for pharmacy.)   Negative: [1] Pharmacy calling with prescription question AND [2] triager unable to answer question   Negative: [1] Caller has URGENT medicine question about med that PCP or specialist prescribed AND [2] triager unable to answer question   Negative: Medicine patch causing local rash or itching   Negative: [1] Caller has medicine question about med NOT prescribed by PCP AND [2] triager unable to answer question (e.g., compatibility with  other med, storage)   Negative: Prescription request for new medicine (not a refill)   Negative: Prescription refill request for a CONTROLLED substance (e.g., narcotics, ADHD medicines)   Negative: [1] Prescription refill request for NON-ESSENTIAL medicine (i.e., no harm to patient if med not taken) AND [2] triager unable to refill per department policy   Negative: [1] Caller has NON-URGENT medicine question about med that PCP prescribed AND [2] triager unable to answer question   Negative: Caller wants to use a complementary or alternative medicine   Negative: [1] Prescription prescribed recently is not at pharmacy AND [2] triager has access to patient's EMR AND [3] prescription is recorded in the EMR   Negative: [1] DOUBLE DOSE (an extra dose or lesser amount) of over-the-counter (OTC) drug AND [2] NO symptoms   Negative: [1] DOUBLE DOSE (an extra dose or lesser amount) of antibiotic drug AND [2] NO symptoms   Negative: Caller has medicine question only, adult not sick, AND triager answers question   Negative: Caller has medicine question, adult has minor symptoms, caller declines triage, AND triager answers question   Negative: Caller requesting information about medicine during pregnancy; adult is not ill AND triager answers question   Negative: Caller requesting information about medicine use with breastfeeding; neither adult nor infant is ill, triager answers question   Negative: [1] Caller requesting a prescription renewal (no refills left), no triage required, AND [2] triager able to renew prescription per department policy   Negative: Patient has refills remaining on their prescription    Protocols used: MEDICATION QUESTION CALL-A-

## 2022-03-08 NOTE — PROGRESS NOTES
Albin is a 41 y.o. male here today for a pre-operative visit in preparation for a Left ankle arthroscopy with possible microfracture, left nerve decompression (Dr. Huertas), and left ankle ligament repair to be performed by Dr. Hager on 3-16-22.  he was last seen and treated in the clinic on 1-14-22.  he has since seen their primary care for optimization of the chronic health concerns.  There has been no significant change in their past medical or orthopedic status since the previous visit.  No fever, chills, malaise or unexplained weight change.     Allergies, medications, past medical history and past surgical history were reviewed with the patient.     Physical examination was performed.     Consents were signed.   Patient has crutches and scooter and will bring with them the day of surgery. They have been counseled on proper use of the assistive device.     Discussed COVID19 with the patient, they are aware of our current policies and procedures, were given the option of delaying surgery, and they elect to proceed. Covid testing to be done 48 hours prior to surgery.    Pre, sharon, and post operative procedure and expectations were discussed.  Questions were answered. The patient has been educated and is ready to proceed with surgery.  Approximately 30 minutes was spent discussing surgical outcomes, plans, procedures, pre, sharon, and post operative expectations and care. The risks, benefits and alternatives to surgery were discussed with the patient at great length.  These include bleeding, infection, vessel/nerve damage, pain, numbness, tingling, complex regional pain syndrome, hardware/surgical failure, need for further surgery, malunion, nonunion, DVT, PE, arthritis and death. He also understands that the risks of surgery may be greater for some patients due to health or lifestyle issues, such as a current condition or a history of heart disease, obesity, clotting disorders, recurrent infections, smoking,  sedentary lifestyle, or noncompliance with medications, therapy, or followup. The degree of the increased risk is hard to estimate with any degree of precision.  Patient states an understanding and wishes to proceed with surgery.   All questions were answered.  No guarantees were implied or stated.  Informed consent was obtained  The patient will contact us if the have any questions, concerns, and changes in their medical condition prior to surgery.

## 2022-03-09 ENCOUNTER — OFFICE VISIT (OUTPATIENT)
Dept: INTERNAL MEDICINE | Facility: CLINIC | Age: 42
End: 2022-03-09
Payer: OTHER MISCELLANEOUS

## 2022-03-09 ENCOUNTER — ANESTHESIA EVENT (OUTPATIENT)
Dept: SURGERY | Facility: HOSPITAL | Age: 42
End: 2022-03-09
Payer: OTHER MISCELLANEOUS

## 2022-03-09 VITALS
HEIGHT: 72 IN | SYSTOLIC BLOOD PRESSURE: 153 MMHG | TEMPERATURE: 98 F | OXYGEN SATURATION: 97 % | HEART RATE: 79 BPM | WEIGHT: 202 LBS | BODY MASS INDEX: 27.36 KG/M2 | DIASTOLIC BLOOD PRESSURE: 81 MMHG

## 2022-03-09 DIAGNOSIS — G57.32 PERONEAL NEURITIS, LEFT: Primary | ICD-10-CM

## 2022-03-09 DIAGNOSIS — S82.62XD CLOSED AVULSION FRACTURE OF LATERAL MALLEOLUS OF LEFT FIBULA WITH ROUTINE HEALING, SUBSEQUENT ENCOUNTER: ICD-10-CM

## 2022-03-09 DIAGNOSIS — R03.0 ELEVATED BP WITHOUT DIAGNOSIS OF HYPERTENSION: ICD-10-CM

## 2022-03-09 DIAGNOSIS — F12.90 MARIJUANA USE: ICD-10-CM

## 2022-03-09 PROCEDURE — 99999 PR PBB SHADOW E&M-EST. PATIENT-LVL III: ICD-10-PCS | Mod: PBBFAC,,, | Performed by: NURSE PRACTITIONER

## 2022-03-09 PROCEDURE — 99214 OFFICE O/P EST MOD 30 MIN: CPT | Mod: S$GLB,,, | Performed by: NURSE PRACTITIONER

## 2022-03-09 PROCEDURE — 99999 PR PBB SHADOW E&M-EST. PATIENT-LVL III: CPT | Mod: PBBFAC,,, | Performed by: NURSE PRACTITIONER

## 2022-03-09 PROCEDURE — 99214 PR OFFICE/OUTPT VISIT, EST, LEVL IV, 30-39 MIN: ICD-10-PCS | Mod: S$GLB,,, | Performed by: NURSE PRACTITIONER

## 2022-03-09 RX ORDER — ASCORBIC ACID 500 MG
500 TABLET ORAL DAILY
COMMUNITY

## 2022-03-09 NOTE — OUTPATIENT SUBJECTIVE & OBJECTIVE
Outpatient Subjective & Objective      Chief Complaint: Preoperative evaulation, perioperative medical management, and complication reduction plan.     Functional Capacity: cut trees- very active. Parked in garage and walked to POC without CP/SOB.       Anesthesia issues: woke during colonoscopy combative    Difficulty mouth opening: No    Steroid use in the last 12 months: No     Dental Issues: None    Family anesthesia difficulty: None         Family Hx of Thrombosis: None    Past Medical History:   Diagnosis Date    Anxiety     Diverticulosis 2017    Per outside Colonoscopy in Care Everywhere    Internal hemorrhoids 2017    per outside colonoscopy         Past Medical History Pertinent Negatives:   Diagnosis Date Noted    Anemia 03/09/2022    Asthma 03/09/2022    CHF (congestive heart failure) 03/09/2022    COPD (chronic obstructive pulmonary disease) 03/09/2022    Coronary artery disease 03/09/2022    Deep vein thrombosis 03/09/2022    Depression 03/09/2022    Diabetes mellitus, type 2 03/09/2022    Disorder of kidney and ureter 03/09/2022    GERD (gastroesophageal reflux disease) 03/09/2022    Hyperlipidemia 03/09/2022    Hypertension 03/09/2022    Myocardial infarction 03/09/2022    Pulmonary embolism 03/09/2022    Seizures 03/09/2022    Stroke 03/09/2022    Thyroid disease 03/09/2022         Past Surgical History:   Procedure Laterality Date    COLONOSCOPY  2017    SKIN CANCER EXCISION Right     taken out at age 10    SKIN GRAFT      age 10 - skin cancer removal       Review of Systems   Constitutional: Negative for chills, fatigue, fever and unexpected weight change.   HENT: Positive for tinnitus (occasional). Negative for dental problem, hearing loss, postnasal drip, rhinorrhea, sore throat and trouble swallowing.    Eyes: Negative for photophobia, pain, discharge and visual disturbance.   Respiratory: Negative for apnea, cough, chest tightness, shortness of breath and wheezing.     Cardiovascular: Negative for chest pain, palpitations and leg swelling.   Gastrointestinal: Negative for abdominal pain, blood in stool, constipation, nausea and vomiting.        Denies Fatty liver, Hepatitis   Endocrine: Negative for cold intolerance, heat intolerance, polydipsia, polyphagia and polyuria.   Genitourinary: Negative for decreased urine volume, difficulty urinating, dysuria, frequency, hematuria and urgency.   Musculoskeletal: Positive for joint swelling (left ankle). Negative for arthralgias, back pain, neck pain and neck stiffness.   Skin: Negative for rash and wound.   Allergic/Immunologic: Negative for immunocompromised state.   Neurological: Negative for dizziness, tremors, seizures, syncope, weakness, numbness and headaches.   Hematological: Negative for adenopathy. Does not bruise/bleed easily.   Psychiatric/Behavioral: Negative for confusion, hallucinations, sleep disturbance and suicidal ideas.              VITALS  Visit Vitals  BP (!) 153/81 (BP Location: Left arm, Patient Position: Sitting)   Pulse 79   Temp 97.8 °F (36.6 °C) (Oral)   Ht 6' (1.829 m)   Wt 91.6 kg (202 lb)   SpO2 97%   BMI 27.40 kg/m²          Physical Exam  Vitals reviewed.   Constitutional:       General: He is not in acute distress.     Appearance: He is well-developed.   HENT:      Head: Normocephalic.      Nose: Nose normal.      Mouth/Throat:      Pharynx: No oropharyngeal exudate.   Eyes:      General:         Right eye: No discharge.         Left eye: No discharge.      Conjunctiva/sclera: Conjunctivae normal.      Pupils: Pupils are equal, round, and reactive to light.   Neck:      Thyroid: No thyromegaly.      Vascular: No carotid bruit or JVD.      Trachea: No tracheal deviation.   Cardiovascular:      Rate and Rhythm: Normal rate and regular rhythm.      Pulses:           Carotid pulses are 2+ on the right side and 2+ on the left side.       Dorsalis pedis pulses are 2+ on the right side and 2+ on the left  side.        Posterior tibial pulses are 2+ on the right side and 2+ on the left side.      Heart sounds: Normal heart sounds. No murmur heard.  Pulmonary:      Effort: Pulmonary effort is normal. No respiratory distress.      Breath sounds: Normal breath sounds. No stridor. No wheezing, rhonchi or rales.   Abdominal:      General: Bowel sounds are normal. There is no distension.      Palpations: Abdomen is soft.      Tenderness: There is no abdominal tenderness. There is no guarding.   Musculoskeletal:      Cervical back: Normal range of motion.      Right lower leg: No edema.      Left lower leg: No edema.   Lymphadenopathy:      Cervical: No cervical adenopathy.   Skin:     General: Skin is warm and dry.      Capillary Refill: Capillary refill takes less than 2 seconds.      Findings: No erythema or rash.   Neurological:      Mental Status: He is alert and oriented to person, place, and time.      Coordination: Coordination normal.          Significant Labs:  Lab Results   Component Value Date    WBC 9.83 03/10/2022    HGB 15.5 03/10/2022    HCT 45.3 03/10/2022     03/10/2022    ALT 17 03/10/2022    AST 20 03/10/2022     03/10/2022    K 4.6 03/10/2022     03/10/2022    CREATININE 0.9 03/10/2022    BUN 10 03/10/2022    CO2 26 03/10/2022       Diagnostic Studies: No relevant studies.      Active Cardiac Conditions: None      Revised Cardiac Risk Index   High -Risk Surgery  Intraperitoneal; Intrathoracic; suprainguinal vascular Yes- + 1 No- 0   History of Ischemic Heart Disease   (Hx of MI/positive exercise test/current chest pain due to ischemia/use of nitrate therapy/EKG with pathological Q waves) Yes- + 1 No- 0   History of CHF  (Pulmonary edema/bilateral rales or S3 gallop/PND/CXR showing pulmonary vascular redistribution) Yes- + 1 No- 0   History of CVA   (Prior stroke or TIA) Yes- + 1 No- 0   Pre-operative treatment with insulin Yes- + 1 No- 0   Pre-operative creatinine > 2mg/dl Yes- + 1 No-  0   Total: 0      Risk Status:  Estimated risk of cardiac complications after non-cardiac surgery using the Revised Cardiac Risk Index for Preoperative risk is 3.9 %      ARISCAT (Canet) risk index: Low: 1.6% risk of post-op pulmonary complications.    American Society of Anesthesiologists Physical Status classification (ASA): 2    Geoffrey respiratory failure index: 0.5 %           No further cardiac workup needed prior to surgery.    Outpatient Subjective & Objective

## 2022-03-09 NOTE — ASSESSMENT & PLAN NOTE
Smokes at least one joint every day.  Informed patient it is best to avoid consumption of cannabis at least 12-72 hours before surgery to prevent airway and cardiovascular complications.

## 2022-03-09 NOTE — ANESTHESIA PREPROCEDURE EVALUATION
2022  Albin Barfield is a 41 y.o., male.    Pre-operative evaluation for Procedure(s) (LRB):  ARTHROSCOPY, ANKLE (Left)  REPAIR, LIGAMENT, ANKLE (Left)  DECOMPRESSION, NERVE (Left)    Albin Barfield is a 41 y.o. male     Patient Active Problem List   Diagnosis    Closed avulsion fracture of lateral malleolus of left fibula    Acute left ankle pain    Ankle stiffness, left    Difficulty in walking involving ankle and foot joint    Peroneal neuritis, left    Marijuana use    Elevated BP without diagnosis of hypertension       Review of patient's allergies indicates:  No Known Allergies    No current facility-administered medications on file prior to encounter.     Current Outpatient Medications on File Prior to Encounter   Medication Sig Dispense Refill    amitriptyline (ELAVIL) 25 MG tablet Take 1 tablet (25 mg total) by mouth nightly as needed for Insomnia. 30 tablet 0    pregabalin (LYRICA) 75 MG capsule Take 1 capsule (75 mg total) by mouth 2 (two) times daily. 60 capsule 6    acetaminophen (TYLENOL) 500 MG tablet Take 2 tablets (1,000 mg total) by mouth every 8 (eight) hours. 90 tablet 0    LIDOcaine HCL 2% (XYLOCAINE) 2 % jelly Apply topically as needed. Apply topically once nightly to affected part of foot/feet. (Patient not taking: No sig reported) 30 mL 2    naproxen (NAPROSYN) 500 MG tablet Take 500 mg by mouth 2 (two) times daily as needed.      sildenafil (REVATIO) 20 mg Tab SMARTSI Tablet(s) By Mouth PRN         Past Surgical History:   Procedure Laterality Date    COLONOSCOPY  2017    SKIN CANCER EXCISION Right     taken out at age 10    SKIN GRAFT      age 10 - skin cancer removal         CBC:  Lab Results   Component Value Date    WBC 9.83 03/10/2022    RBC 4.67 03/10/2022    HGB 15.5 03/10/2022    HCT 45.3 03/10/2022     03/10/2022    MCV 97 03/10/2022    MCH  33.2 (H) 03/10/2022    MCHC 34.2 03/10/2022       CMP:   Lab Results   Component Value Date     03/10/2022    K 4.6 03/10/2022     03/10/2022    CO2 26 03/10/2022    BUN 10 03/10/2022    CREATININE 0.9 03/10/2022    GLU 97 03/10/2022    CALCIUM 9.7 03/10/2022    ALBUMIN 4.3 03/10/2022    PROT 7.5 03/10/2022    ALKPHOS 85 03/10/2022    ALT 17 03/10/2022    AST 20 03/10/2022    BILITOT 0.6 03/10/2022         Pre-op Vitals [03/16/22 1018]   BP Pulse Resp Temp SpO2   128/87 72 18 36.7 °C (98.1 °F) 96 %      Height Weight BMI (Calculated)     6' 202 lb 27.4              Pre-op Assessment    I have reviewed the Patient Summary Reports.     I have reviewed the Nursing Notes. I have reviewed the NPO Status.      Review of Systems  Anesthesia Hx:  No problems with previous Anesthesia    Social:  Smokes marijuana   Cardiovascular:   Exercise tolerance: good    Hepatic/GI:   Denies GERD.        Physical Exam  General: Well nourished    Airway:  Mallampati: II   Mouth Opening: Normal  TM Distance: Normal  Tongue: Normal    Chest/Lungs:  Clear to auscultation    Heart:  Rate: Normal  Rhythm: Regular Rhythm        Anesthesia Plan  Type of Anesthesia, risks & benefits discussed:    Anesthesia Type: Gen ETT, Regional  Intra-op Monitoring Plan: Standard ASA Monitors  Post Op Pain Control Plan: multimodal analgesia and IV/PO Opioids PRN  Induction:  IV  Airway Plan: Direct  Informed Consent: Informed consent signed with the Patient and all parties understand the risks and agree with anesthesia plan.  All questions answered.   ASA Score: 2  Anesthesia Plan Notes: Discussed possible post op block if ok by surgeons    Ready For Surgery From Anesthesia Perspective.     .

## 2022-03-09 NOTE — PROGRESS NOTES
[facility]  Progress Note    Patient Name: Albin Barfield  MRN: [id number]  Date of Evaluation- 03/13/2022  PCP- [facility] cases for Albin Barfield [[id number]]     Case ID Status Date Time Rey Procedure Provider Location    [id number] [facility] 3/16/2022 11:00  ARTHROSCOPY, ANKLE Mitali Hager MD [[id number]] [facility] OR          HPI:  This is a 41 y.o. male  who presents today for a preoperative evaluation in preparation for an Orthopedic  procedure.  Scheduled for  left ankle arthroscopy with nerve decompression and ligament repair.  Surgery is indicated for .   Patient is new to me.  Details of current problem: The duration of problem is six months. 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  Pain is becoming progressively worse. MRI of left ankle reveals complete tear of anterior talofibular ligament.  Reports symptoms of  stabbing, burning, shocking, throbbing pain to left ankle radiating down to left foot.   There are no aggravating factors.   Reports pain: 6/10 to left ankle; use of crutches prn.    The history has been obtained by speaking with the patient and reviewing the electronic medical record and/or outside health information. Significant health conditions for the perioperative period are discussed below in assessment and plan.     Patient reports current health status to be Good.  Denies any new symptoms before surgery.          Subjective/ Objective:     Chief Complaint: Preoperative evaulation, perioperative medical management, and complication reduction plan.     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       Anesthesia issues: woke during colonoscopy combative    Difficulty mouth opening: No    Steroid use in the last 12 months: No     Dental Issues: None    Family anesthesia difficulty: None         Family Hx of Thrombosis: None    Past Medical History:   Diagnosis Date    Anxiety      Diverticulosis 2017    Per outside Colonoscopy in Care Everywhere    Internal hemorrhoids 2017    per outside colonoscopy         Past Medical History Pertinent Negatives:   Diagnosis Date Noted    Anemia 03/09/2022    Asthma 03/09/2022    CHF (congestive heart failure) 03/09/2022    COPD (chronic obstructive pulmonary disease) 03/09/2022    Coronary artery disease 03/09/2022    Deep vein thrombosis 03/09/2022    Depression 03/09/2022    Diabetes mellitus, type 2 03/09/2022    Disorder of kidney and ureter 03/09/2022    GERD (gastroesophageal reflux disease) 03/09/2022    Hyperlipidemia 03/09/2022    Hypertension 03/09/2022    Myocardial infarction 03/09/2022    Pulmonary embolism 03/09/2022    Seizures 03/09/2022    Stroke 03/09/2022    Thyroid disease 03/09/2022         Past Surgical History:   Procedure Laterality Date    COLONOSCOPY  2017    SKIN CANCER EXCISION Right     taken out at age 10    SKIN GRAFT      age 10 - skin cancer removal       Review of Systems   Constitutional: Negative for chills, fatigue, fever and unexpected weight change.   HENT: Positive for tinnitus (occasional). Negative for dental problem, hearing loss, postnasal drip, rhinorrhea, sore throat and trouble swallowing.    Eyes: Negative for photophobia, pain, discharge and visual disturbance.   Respiratory: Negative for apnea, cough, chest tightness, shortness of breath and wheezing.    Cardiovascular: Negative for chest pain, palpitations and leg swelling.   Gastrointestinal: Negative for abdominal pain, blood in stool, constipation, nausea and vomiting.        Denies Fatty liver, Hepatitis   Endocrine: Negative for cold intolerance, heat intolerance, polydipsia, polyphagia and polyuria.   Genitourinary: Negative for decreased urine volume, difficulty urinating, dysuria, frequency, hematuria and urgency.   Musculoskeletal: Positive for joint swelling (left ankle). Negative for arthralgias, back pain, neck pain and neck  stiffness.   Skin: Negative for rash and wound.   Allergic/Immunologic: Negative for immunocompromised state.   Neurological: Negative for dizziness, tremors, seizures, syncope, weakness, numbness and headaches.   Hematological: Negative for adenopathy. Does not bruise/bleed easily.   Psychiatric/Behavioral: Negative for confusion, hallucinations, sleep disturbance and suicidal ideas.              VITALS  Visit Vitals  BP (!) 153/81 (BP Location: Left arm, Patient Position: Sitting)   Pulse 79   Temp 97.8 °F (36.6 °C) (Oral)   Ht 6' (1.829 m)   Wt 91.6 kg (202 lb)   SpO2 97%   BMI 27.40 kg/m²          Physical Exam  Vitals reviewed.   Constitutional:       General: He is not in acute distress.     Appearance: He is well-developed.   HENT:      Head: Normocephalic.      Nose: Nose normal.      Mouth/Throat:      Pharynx: No oropharyngeal exudate.   Eyes:      General:         Right eye: No discharge.         Left eye: No discharge.      Conjunctiva/sclera: Conjunctivae normal.      Pupils: Pupils are equal, round, and reactive to light.   Neck:      Thyroid: No thyromegaly.      Vascular: No carotid bruit or JVD.      Trachea: No tracheal deviation.   Cardiovascular:      Rate and Rhythm: Normal rate and regular rhythm.      Pulses:           Carotid pulses are 2+ on the right side and 2+ on the left side.       Dorsalis pedis pulses are 2+ on the right side and 2+ on the left side.        Posterior tibial pulses are 2+ on the right side and 2+ on the left side.      Heart sounds: Normal heart sounds. No murmur heard.  Pulmonary:      Effort: Pulmonary effort is normal. No respiratory distress.      Breath sounds: Normal breath sounds. No stridor. No wheezing, rhonchi or rales.   Abdominal:      General: Bowel sounds are normal. There is no distension.      Palpations: Abdomen is soft.      Tenderness: There is no abdominal tenderness. There is no guarding.   Musculoskeletal:      Cervical back: Normal range of  motion.      Right lower leg: No edema.      Left lower leg: No edema.   Lymphadenopathy:      Cervical: No cervical adenopathy.   Skin:     General: Skin is warm and dry.      Capillary Refill: Capillary refill takes less than 2 seconds.      Findings: No erythema or rash.   Neurological:      Mental Status: He is alert and oriented to person, place, and time.      Coordination: Coordination normal.          Significant Labs:  Lab Results   Component Value Date    WBC 9.83 03/10/2022    HGB 15.5 03/10/2022    HCT 45.3 03/10/2022     03/10/2022    ALT 17 03/10/2022    AST 20 03/10/2022     03/10/2022    K 4.6 03/10/2022     03/10/2022    CREATININE 0.9 03/10/2022    BUN 10 03/10/2022    CO2 26 03/10/2022       Diagnostic Studies: No relevant studies.      Active Cardiac Conditions: None      Revised Cardiac Risk Index   High -Risk Surgery  Intraperitoneal; Intrathoracic; suprainguinal vascular Yes- + 1 No- 0   History of Ischemic Heart Disease   (Hx of MI/positive exercise test/current chest pain due to ischemia/use of nitrate therapy/EKG with pathological Q waves) Yes- + 1 No- 0   History of CHF  (Pulmonary edema/bilateral rales or S3 gallop/PND/CXR showing pulmonary vascular redistribution) Yes- + 1 No- 0   History of CVA   (Prior stroke or TIA) Yes- + 1 No- 0   Pre-operative treatment with insulin Yes- + 1 No- 0   Pre-operative creatinine > 2mg/dl Yes- + 1 No- 0   Total: 0      Risk Status:  Estimated risk of cardiac complications after non-cardiac surgery using the Revised Cardiac Risk Index for Preoperative risk is 3.9 %      ARISCAT (Canet) risk index: Low: 1.6% risk of post-op pulmonary complications.    American Society of Anesthesiologists Physical Status classification (ASA): 2    Prestonzull respiratory failure index: 0.5 %           No further cardiac workup needed prior to surgery.          Orders Placed This Encounter    Comprehensive Metabolic Panel    CBC Without Differential            Assessment/Plan:     Closed avulsion fracture of lateral malleolus of left fibula  Scheduled with Dr. Hager  left ankle arthroscopy/ligament repair and nerve decompression on 3/16/22.    Marijuana use  Smokes at least one joint every day.  Informed patient it is best to avoid consumption of cannabis at least 12-72 hours before surgery to prevent airway and cardiovascular complications.    Elevated BP without diagnosis of hypertension  /81; clinic visit readings have been normal.  Denies headaches/urine volume changes/dizziness/syncope/vision changes.  Suggest monitoring during perioperative period.         Discussion/Management of Perioperative Care    Thromboembolic prophylaxis (VTE) Care: Risk factors for thrombosis include: surgical procedure.  I recommend prophylaxis of thromboembolism with the use of compression stockings/pneumatic devices, and/or pharmacologic agents. The benefits should outweigh the risks for pharmacologic prophylaxis in the perioperative period. I also encourage early ambulation if not contraindicated during the post-operative period.    Risk factors for post-operative pulmonary complications include:surgery > 3 hours. To reduce the risk of pulmonary complications, prophylactic recommendations include: incentive spirometry use/deep breathing, end tidal carbon dioxide monitoring and pain control.    To reduce the risk of postoperative renal complications, I recommend the patient maintain adequate fluid volume status by drinking 2 liters of water daily.  Avoid/reduce NSAIDS and SORIANO-2 inhibitors use as well as IV contrast for renal protection.    I recommend the use of appropriate prophylactic antibiotics to reduce the risk of surgical site infections.    Delirium risk factors include opioid use. I recommend to avoid/reduce use of benzodiazepine use (not for patients who take on a regular basis), anticholinergics, Benadryl,  and agents that may cause postoperative serotonin  syndrome.  Controlled pain can decrease the risk for postop delirium and since opioids are used for postoperative pain control, I suggest using the lowest dose for the shortest amount of time necessary for pain management.       This visit was focused on Preoperative evaluation, Perioperative Medical management, complication reduction plans. I suggest that the patient follows up with primary care or relevant sub specialists for ongoing health care.    I appreciate the opportunity to be involved in this patients care. Please feel free to contact me if there were any questions about this consultation.    Patient is optimized for surgery.    Labs acceptable for surgery.      Javon Banuelos, SARITHA  Perioperative Medicine  [facility]

## 2022-03-09 NOTE — ASSESSMENT & PLAN NOTE
Scheduled with Dr. Hager  left ankle arthroscopy/ligament repair and nerve decompression on 3/16/22.

## 2022-03-09 NOTE — HPI
This is a 41 y.o. male  who presents today for a preoperative evaluation in preparation for an Orthopedic  procedure.  Scheduled for  left ankle arthroscopy with nerve decompression and ligament repair.  Surgery is indicated for .   Patient is new to me.  Details of current problem: The duration of problem is six months. 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  Pain is becoming progressively worse. MRI of left ankle reveals complete tear of anterior talofibular ligament.  Reports symptoms of  stabbing, burning, shocking, throbbing pain to left ankle radiating down to left foot.   There are no aggravating factors.   Reports pain: 6/10 to left ankle; use of crutches prn.    The history has been obtained by speaking with the patient and reviewing the electronic medical record and/or outside health information. Significant health conditions for the perioperative period are discussed below in assessment and plan.     Patient reports current health status to be Good.  Denies any new symptoms before surgery.

## 2022-03-09 NOTE — ASSESSMENT & PLAN NOTE
/81; clinic visit readings have been normal.  Denies headaches/urine volume changes/dizziness/syncope/vision changes.  Suggest monitoring during perioperative period.

## 2022-03-10 ENCOUNTER — LAB VISIT (OUTPATIENT)
Dept: LAB | Facility: HOSPITAL | Age: 42
End: 2022-03-10
Attending: NURSE PRACTITIONER
Payer: OTHER MISCELLANEOUS

## 2022-03-10 DIAGNOSIS — G57.32 PERONEAL NEURITIS, LEFT: ICD-10-CM

## 2022-03-10 LAB
ALBUMIN SERPL BCP-MCNC: 4.3 G/DL (ref 3.5–5.2)
ALP SERPL-CCNC: 85 U/L (ref 55–135)
ALT SERPL W/O P-5'-P-CCNC: 17 U/L (ref 10–44)
ANION GAP SERPL CALC-SCNC: 12 MMOL/L (ref 8–16)
AST SERPL-CCNC: 20 U/L (ref 10–40)
BILIRUB SERPL-MCNC: 0.6 MG/DL (ref 0.1–1)
BUN SERPL-MCNC: 10 MG/DL (ref 6–20)
CALCIUM SERPL-MCNC: 9.7 MG/DL (ref 8.7–10.5)
CHLORIDE SERPL-SCNC: 102 MMOL/L (ref 95–110)
CO2 SERPL-SCNC: 26 MMOL/L (ref 23–29)
CREAT SERPL-MCNC: 0.9 MG/DL (ref 0.5–1.4)
ERYTHROCYTE [DISTWIDTH] IN BLOOD BY AUTOMATED COUNT: 12.5 % (ref 11.5–14.5)
EST. GFR  (AFRICAN AMERICAN): >60 ML/MIN/1.73 M^2
EST. GFR  (NON AFRICAN AMERICAN): >60 ML/MIN/1.73 M^2
GLUCOSE SERPL-MCNC: 97 MG/DL (ref 70–110)
HCT VFR BLD AUTO: 45.3 % (ref 40–54)
HGB BLD-MCNC: 15.5 G/DL (ref 14–18)
MCH RBC QN AUTO: 33.2 PG (ref 27–31)
MCHC RBC AUTO-ENTMCNC: 34.2 G/DL (ref 32–36)
MCV RBC AUTO: 97 FL (ref 82–98)
PLATELET # BLD AUTO: 273 K/UL (ref 150–450)
PMV BLD AUTO: 10.5 FL (ref 9.2–12.9)
POTASSIUM SERPL-SCNC: 4.6 MMOL/L (ref 3.5–5.1)
PROT SERPL-MCNC: 7.5 G/DL (ref 6–8.4)
RBC # BLD AUTO: 4.67 M/UL (ref 4.6–6.2)
SODIUM SERPL-SCNC: 140 MMOL/L (ref 136–145)
WBC # BLD AUTO: 9.83 K/UL (ref 3.9–12.7)

## 2022-03-10 PROCEDURE — 80053 COMPREHEN METABOLIC PANEL: CPT | Performed by: NURSE PRACTITIONER

## 2022-03-10 PROCEDURE — 85027 COMPLETE CBC AUTOMATED: CPT | Performed by: NURSE PRACTITIONER

## 2022-03-10 PROCEDURE — 36415 COLL VENOUS BLD VENIPUNCTURE: CPT | Mod: PO | Performed by: NURSE PRACTITIONER

## 2022-03-11 RX ORDER — NAPROXEN 500 MG/1
500 TABLET ORAL 2 TIMES DAILY WITH MEALS
Qty: 60 TABLET | Refills: 0 | Status: SHIPPED | OUTPATIENT
Start: 2022-03-11 | End: 2022-04-11

## 2022-03-11 RX ORDER — OXYCODONE HYDROCHLORIDE 5 MG/1
5 TABLET ORAL EVERY 4 HOURS PRN
Qty: 30 TABLET | Refills: 0 | Status: SHIPPED | OUTPATIENT
Start: 2022-03-11 | End: 2022-03-21 | Stop reason: SDUPTHER

## 2022-03-11 RX ORDER — METHOCARBAMOL 750 MG/1
750 TABLET, FILM COATED ORAL 3 TIMES DAILY PRN
Qty: 30 TABLET | Refills: 0 | Status: SHIPPED | OUTPATIENT
Start: 2022-03-11 | End: 2022-03-25 | Stop reason: SDUPTHER

## 2022-03-11 RX ORDER — ACETAMINOPHEN 500 MG
1000 TABLET ORAL EVERY 8 HOURS
Qty: 90 TABLET | Refills: 0 | Status: SHIPPED | OUTPATIENT
Start: 2022-03-11 | End: 2022-04-04 | Stop reason: SDUPTHER

## 2022-03-12 NOTE — H&P
Subjective:      Patient ID: Albin Barfield is a 41 y.o. male.    Chief Complaint: Pre-op Exam  Albin Barfield is a 41 y.o. male who presents today for evaluation of left ankle pain.  Rates pain as 5/10.  Pain has been ongoing for a few months (sep 2021).  Inciting event: injury;fx and torn ligaments.  Treatments tried: PT.     Pain localized to entire lateral ankle and hindfoot.  There are 2 components which he states he can differentiate - nerve pain and ankle pain.  The nerve pain has been getting more intense but narrowing to a smaller area - primarily SPN.  It started as the entire distal leg.  The ankle pain is localized to the lateral joint line and ligaments.     He notes symptoms improved with lyrica.  He has stopped therapy due to anticipation of need for surgery.     Does the patient use tobacco products? Yes  If so, what and how often? A pack a day     Past Medical History:   Diagnosis Date    Anxiety     Diverticulosis 2017    Per outside Colonoscopy in Care Everywhere    Internal hemorrhoids 2017    per outside colonoscopy     Past Surgical History:   Procedure Laterality Date    COLONOSCOPY  2017    SKIN CANCER EXCISION Right     taken out at age 10    SKIN GRAFT      age 10 - skin cancer removal     Social History     Occupational History    Not on file   Tobacco Use    Smoking status: Former Smoker     Years: 20.00     Types: Cigarettes     Quit date: 2022     Years since quittin.1    Smokeless tobacco: Current User   Substance and Sexual Activity    Alcohol use: Yes     Comment: social     Drug use: Yes     Frequency: 7.0 times per week     Types: Marijuana    Sexual activity: Not on file      ROS   Musculoskeletal: per HPI  Constitutional: Negative for fever  Cardiovascular: Negative for chest pain  Respiratory: Negative for shortness of breath  Skin: Negative for ulcers or lesions  Neurological: Positive for burning, tingling and numbness  Vascular: Positive for peripheral  "edema  Heme: Negative for blood thinners; Negative for history of blood clot  Endocrine: Negative for diabetes  Immune: Negative for inflammatory arthritis  /Nephrology: Negative for ESRD-on hemodialysis   Current Outpatient Medications on File Prior to Visit   Medication Sig Dispense Refill    acetaminophen (TYLENOL) 500 MG tablet Take 2 tablets (1,000 mg total) by mouth every 8 (eight) hours. 90 tablet 0    amitriptyline (ELAVIL) 25 MG tablet Take 1 tablet (25 mg total) by mouth nightly as needed for Insomnia. 30 tablet 0    LIDOcaine HCL 2% (XYLOCAINE) 2 % jelly Apply topically as needed. Apply topically once nightly to affected part of foot/feet. (Patient not taking: Reported on 3/9/2022) 30 mL 2    naproxen (NAPROSYN) 500 MG tablet Take 500 mg by mouth 2 (two) times daily as needed.      pregabalin (LYRICA) 75 MG capsule Take 1 capsule (75 mg total) by mouth 2 (two) times daily. 60 capsule 6    sildenafil (REVATIO) 20 mg Tab SMARTSI Tablet(s) By Mouth PRN       No current facility-administered medications on file prior to visit.     Review of patient's allergies indicates:  No Known Allergies      Objective:      Vitals:    22 1626   Weight: 90.7 kg (199 lb 15.3 oz)   Height: 5' 11" (1.803 m)     Ortho Exam   General: No acute distress, well-appearing  Neurologic: Alert and oriented x3  Psychiatric: Appropriate mood and affect, cooperative  Cardiovascular: Regular pulse  Respiratory: Breathing on room air  Skin: No rashes or ulcers  Vascular: Palpable DP/PT, no skin changes/vasomotor changes to suggest CRPS  Musculoskeletal: Standing examination demonstrates guarded but neutral alignment.  There is no swelling.  There is no ecchymosis.  Medial longitudinal arch is neutral.    Hip exam demonstrates 3+/5 abductor strength.  Cannot maintain single leg balance without falling to left.     Focused exam of the left lower extremity demonstrates significantly guarded ankle range of motion.  Hindfoot is " flexible but guarded.  Ankle dorsiflexion is decreased due to pain.  Tilt testing demonstrates pain compared to contralateral side.  Drawer testing demonstrates pain compared to contralateral side.       NT over medial joint.  TTP over lateral joint.  Less so TTP over peroneals with crepitus noted.     4/5 TA/GSC/PTT and 3+/5 peroneals with pain.  SILT SP/DP/PT and able to localize with tinels and dysesthesias over the SPN.      Assessment:       1. Pre-op evaluation    2. Closed avulsion fracture of lateral malleolus of left fibula with routine healing, subsequent encounter    3. Peroneal neuritis, left    4. Osteochondritis dissecans of ankle, left    5. Post-op pain          Plan:       Surgery per Dr. Hager.

## 2022-03-13 ENCOUNTER — LAB VISIT (OUTPATIENT)
Dept: URGENT CARE | Facility: CLINIC | Age: 42
End: 2022-03-13
Payer: OTHER MISCELLANEOUS

## 2022-03-13 DIAGNOSIS — Z01.818 PRE-OP TESTING: ICD-10-CM

## 2022-03-13 PROCEDURE — U0005 INFEC AGEN DETEC AMPLI PROBE: HCPCS | Performed by: ORTHOPAEDIC SURGERY

## 2022-03-13 PROCEDURE — U0003 INFECTIOUS AGENT DETECTION BY NUCLEIC ACID (DNA OR RNA); SEVERE ACUTE RESPIRATORY SYNDROME CORONAVIRUS 2 (SARS-COV-2) (CORONAVIRUS DISEASE [COVID-19]), AMPLIFIED PROBE TECHNIQUE, MAKING USE OF HIGH THROUGHPUT TECHNOLOGIES AS DESCRIBED BY CMS-2020-01-R: HCPCS | Performed by: ORTHOPAEDIC SURGERY

## 2022-03-13 NOTE — PROGRESS NOTES

## 2022-03-14 LAB
SARS-COV-2 RNA RESP QL NAA+PROBE: NOT DETECTED
SARS-COV-2- CYCLE NUMBER: NORMAL

## 2022-03-15 ENCOUNTER — TELEPHONE (OUTPATIENT)
Dept: ORTHOPEDICS | Facility: CLINIC | Age: 42
End: 2022-03-15
Payer: OTHER MISCELLANEOUS

## 2022-03-15 NOTE — TELEPHONE ENCOUNTER
Spoke to patient to inform them of their [facility] arrival time (930 am), [facility] [address] [facility], instructions for pre-wash, and reviewed no eating or drinking after midnight, confirmed call in regards to medication instructions, informed pt no uber or lyft after sx must have ride, if applicable remove any polish, and bring any paperwork given at pre op to sx.

## 2022-03-16 ENCOUNTER — ANESTHESIA (OUTPATIENT)
Dept: SURGERY | Facility: HOSPITAL | Age: 42
End: 2022-03-16
Payer: OTHER MISCELLANEOUS

## 2022-03-16 ENCOUNTER — HOSPITAL ENCOUNTER (OUTPATIENT)
Facility: HOSPITAL | Age: 42
Discharge: HOME | End: 2022-03-16
Attending: ORTHOPAEDIC SURGERY | Admitting: ORTHOPAEDIC SURGERY
Payer: OTHER MISCELLANEOUS

## 2022-03-16 VITALS
HEART RATE: 69 BPM | WEIGHT: 202 LBS | TEMPERATURE: 98 F | RESPIRATION RATE: 17 BRPM | OXYGEN SATURATION: 95 % | HEIGHT: 72 IN | BODY MASS INDEX: 27.36 KG/M2 | DIASTOLIC BLOOD PRESSURE: 71 MMHG | SYSTOLIC BLOOD PRESSURE: 133 MMHG

## 2022-03-16 DIAGNOSIS — G57.32 PERONEAL NEURITIS, LEFT: Primary | ICD-10-CM

## 2022-03-16 PROCEDURE — 64450 NJX AA&/STRD OTHER PN/BRANCH: CPT | Mod: 59,LT,, | Performed by: ANESTHESIOLOGY

## 2022-03-16 PROCEDURE — 94761 N-INVAS EAR/PLS OXIMETRY MLT: CPT

## 2022-03-16 PROCEDURE — 27698 PR REPAIR COLLAT ANKLE LIGMNT,SECONDARY: ICD-10-PCS | Mod: 51,LT,, | Performed by: ORTHOPAEDIC SURGERY

## 2022-03-16 PROCEDURE — 37000008 HC ANESTHESIA 1ST 15 MINUTES: Performed by: ORTHOPAEDIC SURGERY

## 2022-03-16 PROCEDURE — 27201423 OPTIME MED/SURG SUP & DEVICES STERILE SUPPLY: Performed by: ORTHOPAEDIC SURGERY

## 2022-03-16 PROCEDURE — 63600175 PHARM REV CODE 636 W HCPCS: Performed by: NURSE ANESTHETIST, CERTIFIED REGISTERED

## 2022-03-16 PROCEDURE — 28234 PR INCISION EXTEN FOOT/TOE TENDON: ICD-10-PCS | Mod: 51,LT,, | Performed by: PODIATRIST

## 2022-03-16 PROCEDURE — 27894 DECOMPRESSION OF LEG: CPT | Mod: LT,,, | Performed by: PODIATRIST

## 2022-03-16 PROCEDURE — 29891 ARTHR ANK OSTCHN DF TAL&/TIB: CPT | Mod: LT,,, | Performed by: ORTHOPAEDIC SURGERY

## 2022-03-16 PROCEDURE — 27698 REPAIR OF ANKLE LIGAMENT: CPT | Mod: 51,LT,, | Performed by: ORTHOPAEDIC SURGERY

## 2022-03-16 PROCEDURE — 25000003 PHARM REV CODE 250: Performed by: STUDENT IN AN ORGANIZED HEALTH CARE EDUCATION/TRAINING PROGRAM

## 2022-03-16 PROCEDURE — 63600175 PHARM REV CODE 636 W HCPCS: Performed by: STUDENT IN AN ORGANIZED HEALTH CARE EDUCATION/TRAINING PROGRAM

## 2022-03-16 PROCEDURE — 64445 NJX AA&/STRD SCIATIC NRV IMG: CPT | Performed by: ANESTHESIOLOGY

## 2022-03-16 PROCEDURE — 25000003 PHARM REV CODE 250: Performed by: SURGERY

## 2022-03-16 PROCEDURE — 25000003 PHARM REV CODE 250: Performed by: NURSE ANESTHETIST, CERTIFIED REGISTERED

## 2022-03-16 PROCEDURE — D9220A PRA ANESTHESIA: ICD-10-PCS | Mod: ,,, | Performed by: ANESTHESIOLOGY

## 2022-03-16 PROCEDURE — 28234 INCISION OF FOOT TENDON: CPT | Mod: 51,LT,, | Performed by: PODIATRIST

## 2022-03-16 PROCEDURE — 25000003 PHARM REV CODE 250: Performed by: ANESTHESIOLOGY

## 2022-03-16 PROCEDURE — 64450 PR NERVE BLOCK INJ, ANES/STEROID, OTHER PERIPHERAL: ICD-10-PCS | Mod: 59,LT,, | Performed by: ANESTHESIOLOGY

## 2022-03-16 PROCEDURE — 27894: ICD-10-PCS | Mod: LT,,, | Performed by: PODIATRIST

## 2022-03-16 PROCEDURE — 76942 ECHO GUIDE FOR BIOPSY: CPT | Mod: 26,,, | Performed by: ANESTHESIOLOGY

## 2022-03-16 PROCEDURE — C1713 ANCHOR/SCREW BN/BN,TIS/BN: HCPCS | Performed by: ORTHOPAEDIC SURGERY

## 2022-03-16 PROCEDURE — 29891 PR ANKLE SCOPE,EXCIS OSTEOCHON DEFCT: ICD-10-PCS | Mod: LT,,, | Performed by: ORTHOPAEDIC SURGERY

## 2022-03-16 PROCEDURE — 36000708 HC OR TIME LEV III 1ST 15 MIN: Performed by: ORTHOPAEDIC SURGERY

## 2022-03-16 PROCEDURE — 36000709 HC OR TIME LEV III EA ADD 15 MIN: Performed by: ORTHOPAEDIC SURGERY

## 2022-03-16 PROCEDURE — 76942 POPLITEAL BLOCK: ICD-10-PCS | Mod: 26,,, | Performed by: ANESTHESIOLOGY

## 2022-03-16 PROCEDURE — 64708 PR NEUROPLASTY OTHER ARM/LEG NERVE,OPEN: ICD-10-PCS | Mod: 51,LT,, | Performed by: PODIATRIST

## 2022-03-16 PROCEDURE — 76942 ECHO GUIDE FOR BIOPSY: CPT | Performed by: ANESTHESIOLOGY

## 2022-03-16 PROCEDURE — 64708 REVISE ARM/LEG NERVE: CPT | Mod: 51,LT,, | Performed by: PODIATRIST

## 2022-03-16 PROCEDURE — 99900035 HC TECH TIME PER 15 MIN (STAT)

## 2022-03-16 PROCEDURE — D9220A PRA ANESTHESIA: Mod: ,,, | Performed by: ANESTHESIOLOGY

## 2022-03-16 PROCEDURE — 71000015 HC POSTOP RECOV 1ST HR: Performed by: ORTHOPAEDIC SURGERY

## 2022-03-16 PROCEDURE — 37000009 HC ANESTHESIA EA ADD 15 MINS: Performed by: ORTHOPAEDIC SURGERY

## 2022-03-16 PROCEDURE — 71000033 HC RECOVERY, INTIAL HOUR: Performed by: ORTHOPAEDIC SURGERY

## 2022-03-16 DEVICE — TISSUE ALLOWRAP DS WET 4X4CM: Type: IMPLANTABLE DEVICE | Site: FOOT | Status: FUNCTIONAL

## 2022-03-16 DEVICE — IMPLANTABLE DEVICE: Type: IMPLANTABLE DEVICE | Site: FOOT | Status: FUNCTIONAL

## 2022-03-16 DEVICE — ANCHOR TRUSHOT SUTURE 1.8X20MM: Type: IMPLANTABLE DEVICE | Site: FOOT | Status: FUNCTIONAL

## 2022-03-16 RX ORDER — METHOCARBAMOL 500 MG/1
500 TABLET, FILM COATED ORAL ONCE
Status: COMPLETED | OUTPATIENT
Start: 2022-03-16 | End: 2022-03-16

## 2022-03-16 RX ORDER — ROCURONIUM BROMIDE 10 MG/ML
INJECTION, SOLUTION INTRAVENOUS
Status: DISCONTINUED | OUTPATIENT
Start: 2022-03-16 | End: 2022-03-16

## 2022-03-16 RX ORDER — FAMOTIDINE 10 MG/ML
INJECTION INTRAVENOUS
Status: DISCONTINUED | OUTPATIENT
Start: 2022-03-16 | End: 2022-03-16

## 2022-03-16 RX ORDER — PROPOFOL 10 MG/ML
VIAL (ML) INTRAVENOUS
Status: DISCONTINUED | OUTPATIENT
Start: 2022-03-16 | End: 2022-03-16

## 2022-03-16 RX ORDER — FENTANYL CITRATE 50 UG/ML
25-200 INJECTION, SOLUTION INTRAMUSCULAR; INTRAVENOUS
Status: DISCONTINUED | OUTPATIENT
Start: 2022-03-16 | End: 2022-03-16 | Stop reason: HOSPADM

## 2022-03-16 RX ORDER — MUPIROCIN 20 MG/G
OINTMENT TOPICAL
Status: DISCONTINUED | OUTPATIENT
Start: 2022-03-16 | End: 2022-03-16 | Stop reason: HOSPADM

## 2022-03-16 RX ORDER — KETAMINE HCL IN 0.9 % NACL 50 MG/5 ML
SYRINGE (ML) INTRAVENOUS
Status: DISCONTINUED | OUTPATIENT
Start: 2022-03-16 | End: 2022-03-16

## 2022-03-16 RX ORDER — HYDROMORPHONE HYDROCHLORIDE 1 MG/ML
0.2 INJECTION, SOLUTION INTRAMUSCULAR; INTRAVENOUS; SUBCUTANEOUS EVERY 5 MIN PRN
Status: DISCONTINUED | OUTPATIENT
Start: 2022-03-16 | End: 2022-03-16 | Stop reason: HOSPADM

## 2022-03-16 RX ORDER — ONDANSETRON HYDROCHLORIDE 2 MG/ML
INJECTION, SOLUTION INTRAMUSCULAR; INTRAVENOUS
Status: DISCONTINUED | OUTPATIENT
Start: 2022-03-16 | End: 2022-03-16

## 2022-03-16 RX ORDER — LIDOCAINE HCL/PF 100 MG/5ML
SYRINGE (ML) INTRAVENOUS
Status: DISCONTINUED | OUTPATIENT
Start: 2022-03-16 | End: 2022-03-16

## 2022-03-16 RX ORDER — HYDROCODONE BITARTRATE AND ACETAMINOPHEN 5; 325 MG/1; MG/1
1 TABLET ORAL EVERY 4 HOURS PRN
Status: DISCONTINUED | OUTPATIENT
Start: 2022-03-16 | End: 2022-03-16 | Stop reason: HOSPADM

## 2022-03-16 RX ORDER — MIDAZOLAM HYDROCHLORIDE 1 MG/ML
.5-4 INJECTION INTRAMUSCULAR; INTRAVENOUS
Status: DISCONTINUED | OUTPATIENT
Start: 2022-03-16 | End: 2022-03-16 | Stop reason: HOSPADM

## 2022-03-16 RX ORDER — BUPIVACAINE HYDROCHLORIDE 5 MG/ML
INJECTION, SOLUTION EPIDURAL; INTRACAUDAL
Status: COMPLETED | OUTPATIENT
Start: 2022-03-16 | End: 2022-03-16

## 2022-03-16 RX ORDER — SODIUM CHLORIDE 0.9 % (FLUSH) 0.9 %
3 SYRINGE (ML) INJECTION
Status: DISCONTINUED | OUTPATIENT
Start: 2022-03-16 | End: 2022-03-16 | Stop reason: HOSPADM

## 2022-03-16 RX ORDER — CEFAZOLIN SODIUM/WATER 2 G/20 ML
2 SYRINGE (ML) INTRAVENOUS
Status: COMPLETED | OUTPATIENT
Start: 2022-03-16 | End: 2022-03-16

## 2022-03-16 RX ORDER — FENTANYL CITRATE 50 UG/ML
INJECTION, SOLUTION INTRAMUSCULAR; INTRAVENOUS
Status: DISCONTINUED | OUTPATIENT
Start: 2022-03-16 | End: 2022-03-16

## 2022-03-16 RX ORDER — CARBOXYMETHYLCELLULOSE SODIUM 5 MG/ML
SOLUTION/ DROPS OPHTHALMIC
Status: DISCONTINUED | OUTPATIENT
Start: 2022-03-16 | End: 2022-03-16

## 2022-03-16 RX ORDER — MIDAZOLAM HYDROCHLORIDE 1 MG/ML
INJECTION INTRAMUSCULAR; INTRAVENOUS
Status: DISCONTINUED | OUTPATIENT
Start: 2022-03-16 | End: 2022-03-16

## 2022-03-16 RX ORDER — SODIUM CHLORIDE 9 MG/ML
INJECTION, SOLUTION INTRAVENOUS CONTINUOUS
Status: DISCONTINUED | OUTPATIENT
Start: 2022-03-16 | End: 2022-03-16 | Stop reason: HOSPADM

## 2022-03-16 RX ORDER — ACETAMINOPHEN 500 MG
1000 TABLET ORAL
Status: COMPLETED | OUTPATIENT
Start: 2022-03-16 | End: 2022-03-16

## 2022-03-16 RX ORDER — HYDROMORPHONE HYDROCHLORIDE 2 MG/ML
INJECTION, SOLUTION INTRAMUSCULAR; INTRAVENOUS; SUBCUTANEOUS
Status: DISCONTINUED | OUTPATIENT
Start: 2022-03-16 | End: 2022-03-16

## 2022-03-16 RX ORDER — DEXAMETHASONE SODIUM PHOSPHATE 4 MG/ML
INJECTION, SOLUTION INTRA-ARTICULAR; INTRALESIONAL; INTRAMUSCULAR; INTRAVENOUS; SOFT TISSUE
Status: DISCONTINUED | OUTPATIENT
Start: 2022-03-16 | End: 2022-03-16

## 2022-03-16 RX ORDER — CELECOXIB 200 MG/1
400 CAPSULE ORAL ONCE
Status: COMPLETED | OUTPATIENT
Start: 2022-03-16 | End: 2022-03-16

## 2022-03-16 RX ORDER — LIDOCAINE HYDROCHLORIDE 10 MG/ML
1 INJECTION, SOLUTION EPIDURAL; INFILTRATION; INTRACAUDAL; PERINEURAL ONCE
Status: DISCONTINUED | OUTPATIENT
Start: 2022-03-16 | End: 2022-03-16 | Stop reason: HOSPADM

## 2022-03-16 RX ADMIN — NEOSTIGMINE METHYLSULFATE 4 MG: 1 INJECTION INTRAVENOUS at 04:03

## 2022-03-16 RX ADMIN — MUPIROCIN: 20 OINTMENT TOPICAL at 10:03

## 2022-03-16 RX ADMIN — GLYCOPYRROLATE 0.4 MG: 0.2 INJECTION, SOLUTION INTRAMUSCULAR; INTRAVENOUS at 04:03

## 2022-03-16 RX ADMIN — HYDROMORPHONE HYDROCHLORIDE 0.4 MG: 2 INJECTION, SOLUTION INTRAMUSCULAR; INTRAVENOUS; SUBCUTANEOUS at 04:03

## 2022-03-16 RX ADMIN — PROPOFOL 50 MG: 10 INJECTION, EMULSION INTRAVENOUS at 02:03

## 2022-03-16 RX ADMIN — LIDOCAINE HYDROCHLORIDE 100 MG: 20 INJECTION, SOLUTION INTRAVENOUS at 01:03

## 2022-03-16 RX ADMIN — SODIUM CHLORIDE, SODIUM GLUCONATE, SODIUM ACETATE, POTASSIUM CHLORIDE, MAGNESIUM CHLORIDE, SODIUM PHOSPHATE, DIBASIC, AND POTASSIUM PHOSPHATE: .53; .5; .37; .037; .03; .012; .00082 INJECTION, SOLUTION INTRAVENOUS at 01:03

## 2022-03-16 RX ADMIN — BUPIVACAINE HYDROCHLORIDE 10 ML: 5 INJECTION, SOLUTION EPIDURAL; INTRACAUDAL at 05:03

## 2022-03-16 RX ADMIN — FENTANYL CITRATE 50 MCG: 50 INJECTION, SOLUTION INTRAMUSCULAR; INTRAVENOUS at 01:03

## 2022-03-16 RX ADMIN — Medication 10 MG: at 03:03

## 2022-03-16 RX ADMIN — MIDAZOLAM HYDROCHLORIDE 2 MG: 1 INJECTION, SOLUTION INTRAMUSCULAR; INTRAVENOUS at 12:03

## 2022-03-16 RX ADMIN — FENTANYL CITRATE 50 MCG: 50 INJECTION, SOLUTION INTRAMUSCULAR; INTRAVENOUS at 03:03

## 2022-03-16 RX ADMIN — Medication 20 MG: at 01:03

## 2022-03-16 RX ADMIN — FAMOTIDINE 20 MG: 10 INJECTION, SOLUTION INTRAVENOUS at 01:03

## 2022-03-16 RX ADMIN — CARBOXYMETHYLCELLULOSE SODIUM 2 DROP: 5 SOLUTION/ DROPS OPHTHALMIC at 01:03

## 2022-03-16 RX ADMIN — HYDROCODONE BITARTRATE AND ACETAMINOPHEN 1 TABLET: 5; 325 TABLET ORAL at 05:03

## 2022-03-16 RX ADMIN — SODIUM CHLORIDE: 0.9 INJECTION, SOLUTION INTRAVENOUS at 10:03

## 2022-03-16 RX ADMIN — Medication 2 G: at 01:03

## 2022-03-16 RX ADMIN — ACETAMINOPHEN 1000 MG: 500 TABLET ORAL at 10:03

## 2022-03-16 RX ADMIN — HYDROMORPHONE HYDROCHLORIDE 0.2 MG: 2 INJECTION, SOLUTION INTRAMUSCULAR; INTRAVENOUS; SUBCUTANEOUS at 04:03

## 2022-03-16 RX ADMIN — ROCURONIUM BROMIDE 50 MG: 10 INJECTION, SOLUTION INTRAVENOUS at 01:03

## 2022-03-16 RX ADMIN — ONDANSETRON 4 MG: 2 INJECTION, SOLUTION INTRAMUSCULAR; INTRAVENOUS at 03:03

## 2022-03-16 RX ADMIN — BUPIVACAINE HYDROCHLORIDE 30 ML: 5 INJECTION, SOLUTION EPIDURAL; INTRACAUDAL; PERINEURAL at 05:03

## 2022-03-16 RX ADMIN — METHOCARBAMOL 500 MG: 500 TABLET ORAL at 05:03

## 2022-03-16 RX ADMIN — PROPOFOL 250 MG: 10 INJECTION, EMULSION INTRAVENOUS at 01:03

## 2022-03-16 RX ADMIN — SODIUM CHLORIDE, SODIUM GLUCONATE, SODIUM ACETATE, POTASSIUM CHLORIDE, MAGNESIUM CHLORIDE, SODIUM PHOSPHATE, DIBASIC, AND POTASSIUM PHOSPHATE: .53; .5; .37; .037; .03; .012; .00082 INJECTION, SOLUTION INTRAVENOUS at 03:03

## 2022-03-16 RX ADMIN — DEXAMETHASONE SODIUM PHOSPHATE 8 MG: 4 INJECTION, SOLUTION INTRAMUSCULAR; INTRAVENOUS at 01:03

## 2022-03-16 RX ADMIN — CELECOXIB 400 MG: 200 CAPSULE ORAL at 10:03

## 2022-03-16 NOTE — ANESTHESIA PROCEDURE NOTES
Intubation    Date/Time: 3/16/2022 1:03 PM  Performed by: Georgia Andrews CRNA  Authorized by: Tory Acosta MD     Intubation:     Induction:  Intravenous    Intubated:  Postinduction    Mask Ventilation:  Easy with oral airway    Attempts:  1    Attempted By:  CRNA    Method of Intubation:  Direct    Blade:  Bauer 2    Laryngeal View Grade: Grade I - full view of cords      Difficult Airway Encountered?: No      Complications:  None    Airway Device:  Oral endotracheal tube    Airway Device Size:  7.5    Style/Cuff Inflation:  Cuffed    Inflation Amount (mL):  6    Tube secured:  23    Secured at:  The lips    Placement Verified By:  Capnometry    Complicating Factors:  None    Findings Post-Intubation:  BS equal bilateral

## 2022-03-16 NOTE — ANESTHESIA POSTPROCEDURE EVALUATION
Anesthesia Post Evaluation    Patient: Albin Barfield    Procedure(s) Performed: Procedure(s) (LRB):  ARTHROSCOPY, ANKLE (Left)  REPAIR, LIGAMENT, ANKLE (Left)  DECOMPRESSION, NERVE (Left)    Final Anesthesia Type: general      Patient location during evaluation: PACU  Patient participation: Yes- Able to Participate  Level of consciousness: awake and alert  Post-procedure vital signs: reviewed and stable  Pain management: adequate  Airway patency: patent    PONV status at discharge: No PONV  Anesthetic complications: no      Cardiovascular status: blood pressure returned to baseline  Respiratory status: unassisted, spontaneous ventilation and room air  Hydration status: euvolemic  Follow-up not needed.          Vitals Value Taken Time   /84 03/16/22 1752   Temp 36.4 °C (97.5 °F) 03/16/22 1651   Pulse 60 03/16/22 1755   Resp 11 03/16/22 1747   SpO2 97 % 03/16/22 1755   Vitals shown include unvalidated device data.      Event Time   Out of Recovery 17:35:00         Pain/Coty Score: Pain Rating Prior to Med Admin: 4 (3/16/2022  5:31 PM)  Coty Score: 10 (3/16/2022  5:25 PM)

## 2022-03-16 NOTE — ANESTHESIA PROCEDURE NOTES
Post-Operative Progess Note


Surgeon (s)/Assistant (s)


Surgeon


MELITA SÁNCHEZ MD


Assistant


n/a





Pre-Operative Diagnosis


Nasal Lesion, Left Cheek Lesion





Post-Operative Diagnosis


same





Post-Op Procedure Note


Date of Procedure:  Oct 4, 2018


Name of Procedure Performed:  


Excision of LEft Cheeck lesion with intermediate repair, Excison of Nsaal


Lesion with FTSG Reconstruction with Donor site Left Neck


Description & Findings


Description and Findings:





n/a


Anesthesia Type


lma


Estimated Blood Loss


minimal


Packing


none.


Specimen(s) collected/removed


cheek leison for frozen


nasal lesion for frozen











MELITA SÁNCHEZ MD Oct 4, 2018 9:29 am Saphenous nerve Single Injection Block    Patient location during procedure: post-op   Block not for primary anesthetic.  Reason for block: at surgeon's request and post-op pain management   Post-op Pain Location: left foot pain   Start time: 3/16/2022 5:01 PM  Timeout: 3/16/2022 5:00 PM   End time: 3/16/2022 5:06 PM    Staffing  Authorizing Provider: Jm Barron MD  Performing Provider: Jm Barron MD    Preanesthetic Checklist  Completed: patient identified, IV checked, site marked, risks and benefits discussed, surgical consent, monitors and equipment checked, pre-op evaluation and timeout performed  Peripheral Block  Patient position: supine  Prep: ChloraPrep  Patient monitoring: heart rate, cardiac monitor, continuous pulse ox, continuous capnometry and frequent blood pressure checks  Block type: saphenous  Laterality: left  Injection technique: single shot  Needle  Needle type: Stimuplex   Needle gauge: 21 G  Needle length: 4 in  Needle localization: anatomical landmarks and ultrasound guidance   -ultrasound image captured on disc.  Assessment  Injection assessment: negative aspiration, negative parasthesia and local visualized surrounding nerve  Paresthesia pain: none  Heart rate change: no  Slow fractionated injection: yes  Pain Tolerance: comfortable throughout block and no complaints  Medications:    Medications: bupivacaine (pf) (MARCAINE) injection 0.5% - Perineural   10 mL - 3/16/2022 5:27:00 PM    Additional Notes  VSS.  PACU RN monitoring vitals throughout procedure.  Patient tolerated procedure well.

## 2022-03-16 NOTE — PLAN OF CARE
Pre op complete. Questions answered. Resting comfortably. Call light in reach. Personal belongings given to sig other.

## 2022-03-16 NOTE — PATIENT INSTRUCTIONS
Dr. Mitali Hager Postoperative Instructions  We are here for you before and after surgery.  Please review the below instructions.  If you have questions or concerns, contact our office ([phone]).  ACTIVITY: Your weight-bearing status indicates how much weight you may (or may not) put on leg after surgery. These guidelines should be strictly followed in order to protect the surgical site.  It is your responsibility to get ready for these restrictions, which is why I talk with you about them before surgery.  Remember, no matter what option is selected below - you just had surgery and healing takes time and rest - PLEASE limit how much you are out and about for first 2 weeks to help decrease pain and swelling.  []  Weight Bearing as tolerated - Safe to put as much weight as you feel comfortable doing on the involved leg.  [x] Non-Weight Bearing - No weight or standing on the involved leg which means the leg should NOT touch ground when you walk.  [] Toe-Touch Weight Bearing - You may touch foot on floor for balance only, but should not walk using the involved leg.  [] Heel-Weight Bearing - Full weight bearing on heel only; no weight on toes.    ELEVATION: It is important that you elevate your foot during the first 48 hours and as much as possible for the first week after surgery! Most foot and ankle surgical procedures are associated with swelling around the surgical site. Elevating your foot is one of the most important ways to limit the swelling and ultimately shorten your recovery time. The easiest technique is to lie down and elevate your foot on pillows or blankets. Your foot should at least be above the level of your heart. It is okay to get up for brief periods of time, but the majority of time the foot should be at rest and elevated.  ICE: Ice, similar to elevation, helps control the swelling and also provides pain relief. Typically, alternating twenty minutes on, twenty minutes off a couple times per  day for first 2-3 days. Use a barrier between the ice and your skin/dressing to make sure dressing does not get wet and skin does not get too cold.  In rare cases, I will tell you not to use ice to prevent making blood vessels constrict.  [x] ICE OK WITH BARRIER LAYER  [] POLARCARE (START POD#1, MAX 20MIN/HOUR)  DRESSING: Most dressings are not changed until your first postoperative visit. If we want you to change the dressing on your own, specific instructions and the appropriate supplies will be given to you in recovery before discharge. All dressings should be kept clean and dry. If you have problems with your dressing, please contact our office ([phone]) and you can be seen sooner for a new dressing.    [x] DO NOT CHANGE DRESSING           [] CHANGE DRESSING IN ___ DAYS  OTHER POSTOPERATIVE EQUIPMENT/INSTRUCTIONS:  []  POSTOPERATIVE SHOE: A postoperative hard-soled shoe has been provided to you to help protect your foot. This is particularly important for you to wear when you are up and walking, you should follow the weight bearing instructions you were given. The postoperative shoe does not need to be worn when you are in bed or resting.  [] POSTOPERATIVE BOOT: A boot has been placed to protect your ankle.  This is particularly important for you to wear, and you should follow the weight bearing instructions you were given.   []  May remove boot to sleep    [] Must keep boot on at all times  [x] POSTOPERATIVE SPLINT: A well-padded dressing with plaster (hard cast material) has been applied to your lower leg. This protects the surgery and helps control swelling. Do not remove it.  Do not bear weight it. It needs to remain dry. Do not attempt to scratch underneath it. If it feels too tight, elevation will usually decrease the swelling and improve your comfort. If the splint becomes soiled, wet or feels excessively tight even after elevation, contact our office and it can be changed if necessary.  [] PINS:  Pins have been used to hold your toes in the appropriate position during the healing process. You may have pin balls on the ends to help prevent snagging the pins on clothing or other objects. It is not unusual for pins to back out or protrude more than their original position. Do not attempt to push the pins back in. If a pin backs or seems loose, call our office. If a pin comes all the way out, this is not an emergency (and do not try to replace it). Please simply call our office during business hours to make us aware if this occurs and we will give you further instructions.  DISCOLORATION: It is common for the toes to swell after surgery and turn a bluish color, especially if the foot is in a position below the heart. If the toes turn dark blue, dark black or completely white, please call our office immediately ([phone]) or come to the emergency room. This is an emergency!  INFECTION: Infection is uncommon, especially the first week after surgery. A low grade fever (less than 101°F) is very common after surgery and is not a sign of infection. Call our office if you experience streaking redness up the leg, foul smell, excessive drainage and fever greater than 101° ([phone]).  DRIVING: Do not resume driving until you have permission from your surgeon.  Even if you have a small surgery and you feel ok to drive, it is important that you follow instructions given by your doctor for your own and others safety.      ANESTHESIA:    There are many ways that surgery can be safely performed without you experiencing pain during the procedure.  You will meet our anesthesia team on the day of surgery and they will discuss those options with you.      After surgery they will provide you with information about the type of anesthesia that you chose and what to expect as well as numbers to call.          MEDICATIONS:   BLOOD CLOTS: It is rare, but blood clots (also known as DVTs) can happen after foot and ankle  surgery.  Please make sure to discuss family or personal history of this with me.  For certain surgeries and higher risk patients, I will add a medication to prevent blood clots.   [x] Aspirin 81mg twice daily or 325 mg daily   []  Other medication - a separate prescription will be provided if needed   [] No medication needed - you should make sure to move around and move your ankle a couple times per day to keep blood circulating  NAUSEA: Nausea after surgery is common, due to lots of things related to your surgery, anesthesia and pain medications.  Let me or Amelia know if you would like a prescription for this.    CONSTIPATION: Constipation is also common, due to narcotic pain medications that slow down normal movement of your intestinal system.  I recommend you  an over the counter stool softener (Colace, Sennosides, Docusate, etc) and take it according to the instructions on the bottle/packaging while you are taking pain medications.  BONE HEALTH: For healing broken bones or bone fusions, I will often recommend supplementation with medications that help provide your body with building blocks of healing bone (calcium and vitamin D).  You can get these from any pharmacy or drug store and any formulation is fine.  [x] Calcium 600 mg twice daily and Vitamin D 4000 IU once daily  [x] Recommend daily multivitamin and balanced diet  PAIN MEDICATIONS  Most people require pain medication after foot and ankle surgery. I understand this and want you to feel as comfortable as possible. I believe in multimodal pain treatment.  What that means is, our team will treat your pain using several different strategies and types of medications that target different ways the body feels pain.    In most cases, you will have regional anesthesia (a block) for your foot/ankle procedure. For many cases, regional anesthesia (numbing the nerves to the surgical area) avoids the need for a general anesthetic (going completely to  sleep with a breathing tube or mask). The other major advantage of regional anesthesia is the ability to provide excellent (and sometimes complete) pain relief for a long period of time after surgery. The anesthesiologist will talk to you about this your day of surgery.  Narcotics: You will be given a prescription for a narcotic pain medication.  Narcotic prescriptions are limited depending on the type of surgical procedure.  Our goal is for narcotics to be your second line medication, meaning you should only use it if the other pain management methods are not working for you.  I hope we can treat your pain through alternate strategies that you no longer need them (or at least are needing much less) 2 weeks after surgery.  Prior to surgery or the day of surgery, you will be given these prescriptions.  Refills for pain pills are best called into the clinic during office hours 1-2 days BEFORE you anticipate running out. As a rule, pain medications are not refilled after hours or on weekends.    Anti-inflammatories: These medications, also known as NSAIDs (i.e. Ibuprofen, Motrin, Naproxen, Aleve, Advil, Celebrex, etc) may be used in combination with pain medications (narcotics) and should be 1st line.  For certain surgeries, you may be given a prescription-strength NSAID, which should be taken as prescribed, but cannot be combined with other NSAID medications.  NSAIDs should be taken with food, and if you have concerns about these medications, consult your doctor prior to surgery.  For certain surgeries, I will tell you not to take NSAIDs as they can interfere with healing of fractures or fusions.   [] NSAIDS (except Aspirin and Celebrex) should not be taken until 4-6 weeks after surgery    [x] NSAIDs encouraged; take according to bottle instructions  Tylenol (aka Acetaminophen): This is a powerful pain reliever and may be taken as alternative to narcotics and NSAIDs (does not affect bone healing).  You should take  according to bottle instructions and not take more than a total of 3 grams (3,000 milligrams) per day. Keep in mind that the pain medication that was prescribed to you may have acetaminophen as part of it.  Consult your doctor or the pharmacy if you have any questions or concerns.  Gabapentin (aka Neurontin):  This is a nerve calming medication, which has been shown to improve pain after orthopedic surgery.  You may be given a prescription for this as it can help treat the nerve irritation and pain that is common after foot and ankle surgery.  It can make you sleepy so be careful when/if you take it during daytime.  Methocarbomol (aka Robaxin):  This is a muscle relaxer, which can help with muscle spasm and cramping pain after surgery.  You may be given a prescription for this as it can help treat muscle pain.  If you already take a muscle relaxer, please let Amelia or I know so as not to duplicate.  CONTACT INFO: A member of our clinical staff can be reached by calling [phone] during business hours (8 AM - 5 PM). We make every effort to return phone calls in a timely manner. If an emergency occurs after hours, an on-call provider can be reached at [phone] or you can go to a nearby urgent care or emergency room for evaluation.  WHEN TO CALL: If you are concerned, we are concerned! Do not hesitate to contact our office at any time.    MD Binh Yousif, Medical Assistant  [phone]  [facility] [place] [address] Amelia Medrano, Physician Assistant    Sapna Isabel, Medical Assistant  [phone]  [address] [place] [id number]

## 2022-03-16 NOTE — OP NOTE
[facility] - Surgery ([facility])  Orthopedic Surgery Department  Operative Note    SUMMARY     Date of Procedure: 3/16/2022     Surgeon: Mitali Hager MD    Assistant(s): Angel Ramos DPM - resident assisting    Procedure:   Left ankle arthroscopy, debridement and microfracture  Left ankle brostrom with artelon reconstuction  Intraoperative use of fluoroscopy <1hr     Pre-Operative Diagnosis:      Inversion sprain of left ankle, sequela [S93.402S]    Peroneal neuritis, left [G57.32]    Osteochondral defect of ankle [M95.8]     Post-Operative Diagnosis: Same    Tourniquet: Thigh tourniquet, 250mmHg, 113 minutes    Indications: See clinical notes for consent and indications including discussion of risks, benefits, and alternatives.    Description of procedure:     Patient was seen identified in the preoperative holding area.  Consent was reviewed and surgical site was marked.  The patient also met our anesthesia colleagues who have their own discussion about risks benefits and alternatives to anesthesia options.      Patient was then brought to the operating room.  Anesthesia: General.  Positioning: Supine with the thigh in the leg nunez and bump under operative extremity.  We ensured that all bony prominences were well padded especially the thigh and fibular head.  A well padded thigh tourniquet was applied.    The left lower extremity was then prepped and draped routine sterile fashion.  A time-out was then performed confirming patient identification, procedure to be performed, laterality and site, proper equipment, and 2g ancef antibiotic infusion.    The foot was placed in the Acufex distractor and joint distracted.  Medial portal was localized with 18 gauge needle and joint filled with 10cc sterile saline.  Portal was established.  Under direct visualization the lateral portal was localized with 18 gauze needle.  Skin was incised sharply and careful blunt dissection carried down to capsule prior to establishing  portal.  Probe then introduced into the joint.  Upon entering joint shaver was used to established anterior viewing room.    Findings: Anterior synovitis.  Fissue with loose cartilage flap of the anterolateral talar dome.    Debridement and procedures performed: Synovitis debrided with shaver.  Electrocautery used for inflamed synovium.  Ringed and regular currettes used to debride flap back to stable edge.  It was in the talar shoulder.  It measured 10x6mm after debridement.  3 microfracture points created taking care not to violate the shoulder itself.    Following this, the joint was drained of all fluid.  Portals were then closed with 3-0 nylon.  Leg was then removed from distraction and thigh nunez.  Gloves were then changed.    At this point, leg was then elevated, exsanguinated with an esmarch and tourniquet inflated.    Dr. Huertas then came into to complete his portion of the procedure (please see separate op note)    I then turned my attention to the Brostrom.  Curved incision marked along the anterior border of the fibula and curved distally over the talar neck.  Skin incised sharply.  Blunt dissection carried down to retinaculum which was quite attenuated.  ATFL and CFL sleeve then elevated in subperiosteal fashion off the fibula.  Findings: Thinning with little redundancy.    Augmentation: Due to quality of the native tissue, I elected to augment with Artelon flexband solo.  Bostwick preloaded with 5mm artelon graft was placed in the talar neck aiming into the body and cephalad of the subtalar joint.  Fluoroscopy was used to confirm placement.  The graft was then passed deep to the ATFL/CFL.  In a similar fashion a drill hole was established in the fibula using fluoroscopy.  Holding the foot neutral dorsiflexion and slight eversion, the artelon was then secured with a second anchor.  I was satisfied with tension of the graft.    Two Conmed trushot anchors loaded with #2 fiber suture were placed.  ATFL and  CFL were then advanced in mattress fashion down to bone.  A window was made in the peroneal sheath inferiorly so they could be protected.  Sutures were tied from ATFL to CFL while holding posterior drawer, neutal dorsiflexion and slight eversion.  The repair and periosteum was then oversewn with 0 vicryl.    Wound was then irrigated and closed in layered fashion with 3-0 monocryl and 3-0 nylon.    An Adaptic, gauze, ABD padded dressing was then applied.  This was then secured with sterile cast padding.  A well-padded splint was then applied and secured loosely with ace wraps.    At the conclusion of procedure, all needle and sponge counts were correct.  The case postoperative de-briefing was held confirming the procedure performed, any relevant specimens and/or postop concerns, and patient disposition.      Complications: No    Estimated Blood Loss (EBL): Minimal           Implants: Artelon Flexband Solo, Conmed Trushot x2    Specimens: None           Condition: Good    Disposition: PACU - hemodynamically stable.    Postop plan:   1. Weight bearing status: NWB LLE x4 weeks  2. Immobilization: Splint x 2 weeks and then boot until 10 weeks postop  3. DVT prophylaxis: aspirin daily  4. Follow-up: 2 weeks with PA; X-Rays: none, place PT orders  --recheck with Dr. Hager in 6 weeks; X-Rays: 3v standing left ankle  5. PT: Start date: 4 weeks postop  Rx/protocol and restrictions:   --Gait training, edema control/desensitization modalities  --ADAT WBAT LLE, immobilization: boot; may initiate boot weaning 10 weeks postop   --AROM/AAROM and gentle strengthening/theraband work, but no PROM/manipulation until 10 weeks postop

## 2022-03-16 NOTE — TRANSFER OF CARE
Anesthesia Transfer of Care Note    Patient: Albin Barfield    Procedure(s) Performed: Procedure(s) (LRB):  ARTHROSCOPY, ANKLE (Left)  REPAIR, LIGAMENT, ANKLE (Left)  DECOMPRESSION, NERVE (Left)    Patient location: PACU    Anesthesia Type: general    Transport from OR: Transported from OR on 6-10 L/min O2 by face mask with adequate spontaneous ventilation    Post pain: adequate analgesia    Post assessment: no apparent anesthetic complications    Post vital signs: stable    Level of consciousness: awake and alert    Nausea/Vomiting: no nausea/vomiting    Complications: none    Transfer of care protocol was followed      Last vitals:   Visit Vitals  /87 (BP Location: Right arm, Patient Position: Lying)   Pulse 100   Temp 36.7 °C (98.1 °F) (Oral)   Resp 20   Ht 6' (1.829 m)   Wt 91.6 kg (202 lb)   SpO2 100%   BMI 27.40 kg/m²

## 2022-03-16 NOTE — PLAN OF CARE
VSS. Patient able to tolerate oral liquids. Patient reports tolerable pain level for discharge. Patient/family received home medication per bedside delivery. Dressing intact. Knee TEDs intact. Patient instructed not to wear ASHVIN hose without wearing closed-back shoes or  socks due to increased risk of falls, verbalized understanding. Crutches at bedside for home use. No distress noted. Patient states he is ready for discharge. Discharge instructions reviewed with patient and family, verbalized understanding. IV discontinued with catheter tip intact. Family at bedside to help patient dress. Patient wheeled to lobby via staff.

## 2022-03-16 NOTE — INTERVAL H&P NOTE
The patient has been examined and the H&P has been reviewed:    I concur with the findings and changes have been noted since the H&P was written: Consent reviewed, site affirmed and marked, questions answered.    Plan for co-surgery with Dr. Huertas reviewed with patient.    Surgery risks, benefits and alternative options discussed and understood by patient/family.          There are no hospital problems to display for this patient.

## 2022-03-16 NOTE — BRIEF OP NOTE
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]    Post-op Diagnosis:  Post-Op Diagnosis Codes:     * Closed avulsion fracture of lateral malleolus of left fibula with routine healing, subsequent encounter [S82.62XD]     * Peroneal neuritis, left [G57.32]     * Osteochondritis dissecans of ankle, 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    DISPOSITION: Home or Self Care    FINAL DIAGNOSIS:  <principal problem not specified>    FOLLOWUP: In clinic    DISCHARGE INSTRUCTIONS:    Discharge Procedure Orders   Diet general     Call MD for:  temperature >100.4     Call MD for:  persistent nausea and vomiting     Call MD for:  severe uncontrolled pain     Call MD for:  difficulty breathing, headache or visual disturbances     Call MD for:  redness, tenderness, or signs of infection (pain, swelling, redness, odor or green/yellow discharge around incision site)     Call MD for:  hives     Call MD for:  persistent dizziness or light-headedness     Call MD for:  extreme fatigue     Leave dressing on - Keep it clean, dry, and intact until clinic visit     Non weight bearing

## 2022-03-16 NOTE — ANESTHESIA PROCEDURE NOTES
Popliteal block    Patient location during procedure: post-op   Block not for primary anesthetic.  Reason for block: at surgeon's request and post-op pain management   Post-op Pain Location: Left foot pain   Start time: 3/16/2022 5:01 PM  Timeout: 3/16/2022 5:00 PM   End time: 3/16/2022 5:06 PM    Staffing  Authorizing Provider: Jm Barron MD  Performing Provider: Jm Barron MD    Preanesthetic Checklist  Completed: patient identified, IV checked, site marked, risks and benefits discussed, surgical consent, monitors and equipment checked, pre-op evaluation and timeout performed  Peripheral Block  Patient position: supine  Prep: ChloraPrep  Patient monitoring: heart rate, cardiac monitor, continuous pulse ox, continuous capnometry and frequent blood pressure checks  Block type: popliteal  Laterality: left  Injection technique: single shot  Needle  Needle type: Stimuplex   Needle gauge: 21 G  Needle length: 4 in  Needle localization: anatomical landmarks and ultrasound guidance   -ultrasound image captured on disc.  Assessment  Injection assessment: negative aspiration, negative parasthesia and local visualized surrounding nerve  Paresthesia pain: none  Heart rate change: no  Slow fractionated injection: yes  Pain Tolerance: comfortable throughout block and no complaints  Medications:    Medications: bupivacaine (pf) (MARCAINE) injection 0.5% - Perineural   30 mL - 3/16/2022 5:25:00 PM    Additional Notes  VSS.  PACU RN monitoring vitals throughout procedure.  Patient tolerated procedure well.    Patient complained of burning pain on the top of his foot prior to the block. He had been evaluated by Dr. Hager and she and Dr. Huertas approved for the patient to have a post op block

## 2022-03-17 ENCOUNTER — TELEPHONE (OUTPATIENT)
Dept: ORTHOPEDICS | Facility: CLINIC | Age: 42
End: 2022-03-17
Payer: OTHER MISCELLANEOUS

## 2022-03-17 RX ORDER — NEOSTIGMINE METHYLSULFATE 1 MG/ML
INJECTION, SOLUTION INTRAVENOUS
Status: DISCONTINUED | OUTPATIENT
Start: 2022-03-16 | End: 2022-03-17

## 2022-03-17 NOTE — OP NOTE
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 Left ankle brostrom with artelon reconstuction    Panel 2: Superficial Peroneal Nerve Decompression, Deep Peroneal Nerve decompression       Tourniquet was inflated to 250 mmHg    Attention was directed to the anterolateral left leg, and a 15 blade incision was performed at the over the superficial peroneal nerve. Meticulous sharp and blunt dissection utilized down through subcutaneous tissues to the level of deep fascia and the deep fascia was sharply released. Bipolar cautery used to control bleeding. The superficial peroneal nerve was identified. Impinging fibrous tissues were identified and transected, verified with manual palpation. The nerve was noted to have pathological fibrous, fatty, and edematous changes. The operative site was lavaged with copious amounts of saline. An Allowrap allograft was then carefully placed directly over the nerve.      Attention was directed to the dorsum of the left foot, using a 15 blade an  incision was performed over the deep peroneal nerve. Meticulous sharp and blunt dissection utilized down through subcutaneous tissues to the level of deep fascia and the deep fascia was sharply released. Using tenotomy scissors the overlying extensor hallucis brevis tendon was released. Bipolar cautery used to control bleeding. The deep peroneal nerve was identified. Impinging fibrous tissues were identified and transected, verified with manual palpation. The nerve was noted to have pathological fibrous and fatty edematous changes. The operative site was lavaged with copious amounts of saline. An Allowrap allograft was then carefully placed directly over the nerve.      The surgical site was irrigated with normal saline solution via bulb syringe.  The surgical site was closed primarily using Monocryl suture and nylon suture.     Estimated Blood Loss (EBL): < 1 mL           Implants:   Implant Name Type Inv. Item Serial No.  Lot No. LRB No. Used Action   [id number]  TISSUE ALLOWRAP DS WET [id number] [employer].  Left 1 Implanted   Artelon FLEXBAND SOLO   [id number] ARTELON  Left 1 Implanted   ANCHOR TRUSHOT SUTURE 1.8X20MM - [id number]  ANCHOR TRUSHOT SUTURE 1.8X20MM  [employer] [id number] Left 2 Implanted       Specimens:   Specimen (24h ago, onward)            None                  Condition: Good    Disposition: PACU - hemodynamically stable.    Attestation: I was present and scrubbed for the entire procedure.    Discharge Note    OUTCOME: Patient tolerated treatment/procedure well without complication and is now ready for discharge.    DISPOSITION: Home or Self Care    FINAL DIAGNOSIS:  <principal problem not specified>    DISCHARGE INSTRUCTIONS:    Discharge Procedure Orders   Diet general     Call MD for:  temperature >100.4     Call MD for:  persistent nausea and vomiting     Call MD for:  severe uncontrolled pain     Call MD for:  difficulty breathing, headache or visual  disturbances     Call MD for:  redness, tenderness, or signs of infection (pain, swelling, redness, odor or green/yellow discharge around incision site)     Call MD for:  hives     Call MD for:  persistent dizziness or light-headedness     Call MD for:  extreme fatigue     Leave dressing on - Keep it clean, dry, and intact until clinic visit     Non weight bearing

## 2022-03-17 NOTE — ADDENDUM NOTE
Addendum  created 03/17/22 1557 by Pino Queen RN    Clinical Note Signed, Intraprocedure Event edited

## 2022-03-17 NOTE — TELEPHONE ENCOUNTER
Dr Hager's SMA spoke with anesthesia, who was going to call the patient.     ----- Message from Rosalio Hernandez sent at 3/17/2022  1:49 PM CDT -----  Regarding: Call Back  Who Called: pt         What is the reason for the call: calling in regards to pt still not being able to feel his toes and he cant move them on his left foot. Please contact to further assist.          Can patient be contacted on [facility]: Yes         Call back number: [phone]

## 2022-03-17 NOTE — ANESTHESIA POST-OP PAIN MANAGEMENT
Acute Pain Service Progress Note      3/17/2022 1500    Received notification from Chuck Heard with Dr. Hager's office notifying that  patient contacted the clinic this afternoon with concerns that his toes to left foot remain numb today after receiving single injection popliteal/saphenous nerve block yesterday afternoon post op for pain control. Also reports that he is unable to move his left foot. Contacted patient to follow up. Reassured patient that the nerve block in which he received yesterday afternoon can last anywhere between 24 - 48 hours, sometimes longer. All questions answered. Verbalizes understanding.

## 2022-03-18 NOTE — ADDENDUM NOTE
Addendum  created 03/18/22 1522 by Pino Queen RN    Clinical Note Signed, Intraprocedure Event edited

## 2022-03-18 NOTE — ANESTHESIA POST-OP PAIN MANAGEMENT
Acute Pain Service Progress Note      3/18/2022 5460    Contacted patient to follow up after having left pop/saph single injection nerve block for post op pain control on 3/16. Patient reports having full sensation to his left leg and foot today. He is also able to wiggle his toes and move his foot. Reports pain to left ankle as well controlled this afternoon with taking oral medications as needed. Denies any concerns at this time.

## 2022-03-21 ENCOUNTER — PATIENT MESSAGE (OUTPATIENT)
Dept: ORTHOPEDICS | Facility: CLINIC | Age: 42
End: 2022-03-21
Payer: OTHER MISCELLANEOUS

## 2022-03-21 DIAGNOSIS — G89.18 POST-OP PAIN: ICD-10-CM

## 2022-03-21 RX ORDER — OXYCODONE HYDROCHLORIDE 5 MG/1
5 TABLET ORAL EVERY 4 HOURS PRN
Qty: 42 TABLET | Refills: 0 | Status: SHIPPED | OUTPATIENT
Start: 2022-03-21 | End: 2022-03-25 | Stop reason: SDUPTHER

## 2022-03-25 DIAGNOSIS — G89.18 POST-OP PAIN: ICD-10-CM

## 2022-03-26 ENCOUNTER — PATIENT MESSAGE (OUTPATIENT)
Dept: ORTHOPEDICS | Facility: CLINIC | Age: 42
End: 2022-03-26
Payer: OTHER MISCELLANEOUS

## 2022-03-26 RX ORDER — METHOCARBAMOL 750 MG/1
750 TABLET, FILM COATED ORAL 3 TIMES DAILY PRN
Qty: 30 TABLET | Refills: 0 | Status: SHIPPED | OUTPATIENT
Start: 2022-03-26 | End: 2022-04-05

## 2022-03-26 RX ORDER — OXYCODONE HYDROCHLORIDE 5 MG/1
5 TABLET ORAL EVERY 4 HOURS PRN
Qty: 42 TABLET | Refills: 0 | Status: SHIPPED | OUTPATIENT
Start: 2022-03-26 | End: 2022-04-04 | Stop reason: SDUPTHER

## 2022-03-31 ENCOUNTER — OFFICE VISIT (OUTPATIENT)
Dept: ORTHOPEDICS | Facility: CLINIC | Age: 42
End: 2022-03-31
Payer: OTHER MISCELLANEOUS

## 2022-03-31 VITALS — HEIGHT: 72 IN | BODY MASS INDEX: 27.4 KG/M2

## 2022-03-31 DIAGNOSIS — G89.18 POST-OP PAIN: ICD-10-CM

## 2022-03-31 DIAGNOSIS — M93.272 OSTEOCHONDRITIS DISSECANS OF ANKLE, LEFT: ICD-10-CM

## 2022-03-31 DIAGNOSIS — G57.32 PERONEAL NEURITIS, LEFT: ICD-10-CM

## 2022-03-31 DIAGNOSIS — S93.402S INVERSION SPRAIN OF ANKLE, LEFT, SEQUELA: ICD-10-CM

## 2022-03-31 DIAGNOSIS — Z09 FOLLOW-UP EXAMINATION AFTER ORTHOPEDIC SURGERY: Primary | ICD-10-CM

## 2022-03-31 PROCEDURE — 99999 PR PBB SHADOW E&M-EST. PATIENT-LVL III: CPT | Mod: PBBFAC,,, | Performed by: PHYSICIAN ASSISTANT

## 2022-03-31 PROCEDURE — 99024 PR POST-OP FOLLOW-UP VISIT: ICD-10-PCS | Mod: S$GLB,,, | Performed by: PHYSICIAN ASSISTANT

## 2022-03-31 PROCEDURE — 99999 PR PBB SHADOW E&M-EST. PATIENT-LVL III: ICD-10-PCS | Mod: PBBFAC,,, | Performed by: PHYSICIAN ASSISTANT

## 2022-03-31 PROCEDURE — 99024 POSTOP FOLLOW-UP VISIT: CPT | Mod: S$GLB,,, | Performed by: PHYSICIAN ASSISTANT

## 2022-03-31 RX ORDER — GABAPENTIN 300 MG/1
300 CAPSULE ORAL 3 TIMES DAILY PRN
Qty: 30 CAPSULE | Refills: 1 | Status: SHIPPED | OUTPATIENT
Start: 2022-03-31 | End: 2022-04-12 | Stop reason: SDUPTHER

## 2022-03-31 RX ORDER — ACETAMINOPHEN 500 MG
1000 TABLET ORAL EVERY 8 HOURS
Qty: 90 TABLET | Refills: 1 | Status: SHIPPED | OUTPATIENT
Start: 2022-03-31 | End: 2022-05-02 | Stop reason: SDUPTHER

## 2022-03-31 RX ORDER — PREGABALIN 100 MG/1
100 CAPSULE ORAL 2 TIMES DAILY
Qty: 60 CAPSULE | Refills: 0 | Status: SHIPPED | OUTPATIENT
Start: 2022-03-31 | End: 2022-04-04 | Stop reason: SDUPTHER

## 2022-03-31 NOTE — PROGRESS NOTES
Mr. Barfield is here today for a post-operative visit after a   Left ankle arthroscopy, debridement and microfracture  Left ankle brostrom with artelon reconstuction  Intraoperative use of fluoroscopy <1hr by Dr. Hager on 3-16-22.  he reports that he is having pain.  Pain is 4/10. He reports nerve pain and sensitivity diffusely at the ankle/foot but worse at the lateral aspect.  he is taking pain medication Oxycodone 5 mg q4h. He takes lyrica 75 mg BID.  he denies fever, chills, and sweats since the time of the surgery. He has remained NWB.     Physical exam:  Post op dressing taken down.  Incision is clean, dry and intact. No warmth, erythema, drainage, or signs of infection. Sutures removed without difficulty.  Limited ROM at the ankle due to pain. Minimal swelling. He has some hypersensitivity to touch throughout the foot/ankle.     Assessment:  Post-op visit 2 weeks    Plan: He will resume boot. Remain NWB LLE x 2 more weeks. Then he can advance as tolerated in the boot. Will increase his Lyrica to 100 mg BID. Gabapentin 300 mg prn also sent to pharmacy. Tylenol refilled. Order placed for PT to start around 4 weeks po. RTC in 4 weeks with Dr. Hager for 3v standing left ankle x-rays.

## 2022-04-04 DIAGNOSIS — G89.18 POST-OP PAIN: ICD-10-CM

## 2022-04-04 RX ORDER — ACETAMINOPHEN 500 MG
1000 TABLET ORAL EVERY 8 HOURS
Qty: 90 TABLET | Refills: 1 | Status: SHIPPED | OUTPATIENT
Start: 2022-04-04 | End: 2022-05-02

## 2022-04-04 RX ORDER — PREGABALIN 100 MG/1
100 CAPSULE ORAL 2 TIMES DAILY
Qty: 60 CAPSULE | Refills: 0 | Status: SHIPPED | OUTPATIENT
Start: 2022-04-04 | End: 2022-04-25

## 2022-04-04 RX ORDER — OXYCODONE HYDROCHLORIDE 5 MG/1
5 TABLET ORAL EVERY 4 HOURS PRN
Qty: 42 TABLET | Refills: 0 | Status: SHIPPED | OUTPATIENT
Start: 2022-04-04 | End: 2022-04-12 | Stop reason: SDUPTHER

## 2022-04-08 ENCOUNTER — NURSE TRIAGE (OUTPATIENT)
Dept: ADMINISTRATIVE | Facility: CLINIC | Age: 42
End: 2022-04-08
Payer: OTHER MISCELLANEOUS

## 2022-04-08 NOTE — TELEPHONE ENCOUNTER
Had a follow up appt last week and was started on gabapentin in addition to his other medications. Since that time he has been aggressive and hostile since starting the medication with the lyrica, oxycodone and methocarbamol. Pt does not know that girlfriend is calling but she is concerned. Pt is not with the caller at this time.    Ortho called back and message left with nurse in OR who states all the MD are scrubbed in. Information given and states MD will call Viviane when available. Viviane advised to expect a call back from MD when out of surgery.    Reason for Disposition   [1] Caller has URGENT medicine question about med that PCP or specialist prescribed AND [2] triager unable to answer question    Protocols used: MEDICATION QUESTION CALL-A-

## 2022-04-10 DIAGNOSIS — G89.18 POST-OP PAIN: ICD-10-CM

## 2022-04-10 RX ORDER — METHOCARBAMOL 750 MG/1
750 TABLET, FILM COATED ORAL 3 TIMES DAILY PRN
Qty: 30 TABLET | Refills: 0 | Status: CANCELLED | OUTPATIENT
Start: 2022-04-10 | End: 2022-04-20

## 2022-04-10 RX ORDER — OXYCODONE HYDROCHLORIDE 5 MG/1
5 TABLET ORAL EVERY 4 HOURS PRN
Qty: 42 TABLET | Refills: 0 | Status: CANCELLED | OUTPATIENT
Start: 2022-04-10

## 2022-04-11 ENCOUNTER — PATIENT MESSAGE (OUTPATIENT)
Dept: ORTHOPEDICS | Facility: CLINIC | Age: 42
End: 2022-04-11
Payer: OTHER MISCELLANEOUS

## 2022-04-11 DIAGNOSIS — G89.18 POST-OP PAIN: ICD-10-CM

## 2022-04-11 DIAGNOSIS — G57.32 PERONEAL NEURITIS, LEFT: Primary | ICD-10-CM

## 2022-04-11 RX ORDER — METHOCARBAMOL 750 MG/1
750 TABLET, FILM COATED ORAL 4 TIMES DAILY
Qty: 120 TABLET | Refills: 2 | Status: CANCELLED | OUTPATIENT
Start: 2022-04-11 | End: 2022-07-10

## 2022-04-11 RX ORDER — CELECOXIB 200 MG/1
200 CAPSULE ORAL 2 TIMES DAILY
Qty: 60 CAPSULE | Refills: 0 | Status: SHIPPED | OUTPATIENT
Start: 2022-04-11 | End: 2022-05-10 | Stop reason: SDUPTHER

## 2022-04-11 RX ORDER — OXYCODONE HYDROCHLORIDE 5 MG/1
5 TABLET ORAL EVERY 6 HOURS PRN
Qty: 28 TABLET | Refills: 0 | Status: CANCELLED | OUTPATIENT
Start: 2022-04-12 | End: 2022-04-19

## 2022-04-11 NOTE — TELEPHONE ENCOUNTER
1st toe nerve pain      Following up from a telephone encounter on for 4-8-22  Patient's significant other stated that she did not feel unsafe.  After speaking with Albin little bit more she felt that his new changes in swings were due to his sleep deprivation and nerve pain.  Notified supervisor of message from after hours and spoke with Dr. Hager regarding this encounter.    Spoke with patient.  States that his big toe has been the worst of the nerve pain along with the top of his foot.  He is not having trouble sleeping at night and the new pain is not keeping him up.  He does report some increased swelling.  Will start patient on Celebrex 200 mg b.i.d.    Medication routed to Dr. Hager to sign

## 2022-04-12 ENCOUNTER — TELEPHONE (OUTPATIENT)
Dept: ORTHOPEDICS | Facility: CLINIC | Age: 42
End: 2022-04-12
Payer: OTHER MISCELLANEOUS

## 2022-04-12 DIAGNOSIS — G89.18 POST-OP PAIN: ICD-10-CM

## 2022-04-12 RX ORDER — OXYCODONE HYDROCHLORIDE 5 MG/1
5 TABLET ORAL EVERY 6 HOURS PRN
Qty: 28 TABLET | Refills: 0 | Status: SHIPPED | OUTPATIENT
Start: 2022-04-12 | End: 2022-04-18 | Stop reason: SDUPTHER

## 2022-04-12 RX ORDER — GABAPENTIN 300 MG/1
300 CAPSULE ORAL 3 TIMES DAILY
Qty: 90 CAPSULE | Refills: 2 | Status: SHIPPED | OUTPATIENT
Start: 2022-04-12 | End: 2022-04-19 | Stop reason: SDUPTHER

## 2022-04-12 NOTE — TELEPHONE ENCOUNTER
Called patient and [facility] to confirm their PT authorization approval.     Sent message to Karin Hoyt to schedule.

## 2022-04-18 ENCOUNTER — PATIENT MESSAGE (OUTPATIENT)
Dept: ORTHOPEDICS | Facility: CLINIC | Age: 42
End: 2022-04-18
Payer: OTHER MISCELLANEOUS

## 2022-04-18 ENCOUNTER — CLINICAL SUPPORT (OUTPATIENT)
Dept: REHABILITATION | Facility: HOSPITAL | Age: 42
End: 2022-04-18
Attending: PHYSICIAN ASSISTANT
Payer: OTHER MISCELLANEOUS

## 2022-04-18 DIAGNOSIS — Z47.89 SURGICAL AFTERCARE, MUSCULOSKELETAL SYSTEM: ICD-10-CM

## 2022-04-18 DIAGNOSIS — G57.32 PERONEAL NEURITIS, LEFT: ICD-10-CM

## 2022-04-18 DIAGNOSIS — M93.272 OSTEOCHONDRITIS DISSECANS OF ANKLE, LEFT: Primary | ICD-10-CM

## 2022-04-18 DIAGNOSIS — M93.272 OSTEOCHONDRITIS DISSECANS OF ANKLE, LEFT: ICD-10-CM

## 2022-04-18 DIAGNOSIS — M79.672 LEFT FOOT PAIN: ICD-10-CM

## 2022-04-18 DIAGNOSIS — S93.402S INVERSION SPRAIN OF ANKLE, LEFT, SEQUELA: ICD-10-CM

## 2022-04-18 PROBLEM — M25.572 ACUTE LEFT ANKLE PAIN: Status: RESOLVED | Noted: 2021-10-29 | Resolved: 2022-04-18

## 2022-04-18 PROBLEM — R26.2 DIFFICULTY IN WALKING INVOLVING ANKLE AND FOOT JOINT: Status: RESOLVED | Noted: 2021-10-29 | Resolved: 2022-04-18

## 2022-04-18 PROBLEM — G89.29 CHRONIC PAIN OF LEFT ANKLE: Status: ACTIVE | Noted: 2021-10-29

## 2022-04-18 PROBLEM — M25.672 ANKLE STIFFNESS, LEFT: Status: RESOLVED | Noted: 2021-10-29 | Resolved: 2022-04-18

## 2022-04-18 PROCEDURE — 97110 THERAPEUTIC EXERCISES: CPT | Mod: PN

## 2022-04-18 PROCEDURE — 97162 PT EVAL MOD COMPLEX 30 MIN: CPT | Mod: PN

## 2022-04-21 ENCOUNTER — CLINICAL SUPPORT (OUTPATIENT)
Dept: REHABILITATION | Facility: HOSPITAL | Age: 42
End: 2022-04-21
Attending: PHYSICIAN ASSISTANT
Payer: OTHER MISCELLANEOUS

## 2022-04-21 DIAGNOSIS — Z47.89 SURGICAL AFTERCARE, MUSCULOSKELETAL SYSTEM: ICD-10-CM

## 2022-04-21 DIAGNOSIS — M79.672 LEFT FOOT PAIN: Primary | ICD-10-CM

## 2022-04-21 PROCEDURE — 97110 THERAPEUTIC EXERCISES: CPT | Mod: PN,CQ

## 2022-04-21 NOTE — PLAN OF CARE
[facility]  Physical Therapy Initial Evaluation    Name: Albin Barfield  Clinic Number: [id number]    Therapy Diagnosis:   Encounter Diagnoses   Name Primary?    Peroneal neuritis, left     Osteochondritis dissecans of ankle, left     Inversion sprain of ankle, left, sequela     Left foot pain     Surgical aftercare, musculoskeletal system      Physician: Amelia Medrano PA-C    Physician Orders: PT Eval and Treat   Comments:   S/p Left ankle arthroscopy, debridement and microfracture  Left ankle brostrom with artelon reconstuction  Intraoperative use of fluoroscopy <1hr by Dr. Hager on 3-16-22.  PT: Start date: 4 weeks postop  Rx/protocol and restrictions:   --Gait training, edema control/desensitization modalities  --ADAT WBAT LLE, immobilization: boot; may initiate boot weaning 10 weeks postop   --AROM/AAROM and gentle strengthening/theraband work, but no PROM/manipulation until 10 weeks postop    Medical Diagnosis from Referral:   G57.32 (ICD-10-CM) - Peroneal neuritis, left   M93.272 (ICD-10-CM) - Osteochondritis dissecans of ankle, left   S93.402S (ICD-10-CM) - Inversion sprain of ankle, left, sequela     Evaluation Date: 4/18/2022  Authorization Period Expiration: 3/31/2023  Plan of Care Expiration: 5/27/2022  Progress Note Due: 5/27/2022  Visit # / Visits authorized: 1/12    Time In: 9:20am  Time Out: 1015am  Total Appointment Time (timed & untimed codes): 55 minutes    Precautions: Standard and chronic pain, Sx precautions    Subjective   Date of injury: 9/29/2021  History of current condition - Albin reports: Fall from about 1-2 feet out of a bucket while working to cut/trim trees. He then had skilled PT intervention with no resolution of s/s and subsequently had ATSx on 3/16/2022.   Date of Procedure: 3/16/2022   Procedure: Left ankle arthroscopy, debridement and microfracture, Left ankle brostrom with artelon reconstuction, Intraoperative use of fluoroscopy <1hr  "  Start PT: 4 weeks postop  Rx/protocol and restrictions:   --Gait training, edema control/desensitization modalities   --ADAT WBAT LLE, immobilization: boot; may initiate boot weaning 10 weeks postop   --AROM/AAROM and gentle strengthening/theraband work, but no PROM/manipulation until 10 weeks postop (5/25/2022)    At time of last Ortho f/u on 3/31/2022: "he reports that he is having pain. Pain is 4/10. He reports nerve pain and sensitivity diffusely at the ankle/foot but worse at the lateral aspect.  he is taking pain medication Oxycodone 5 mg q4h. He takes lyrica 75 mg BID.  he denies fever, chills, and sweats since the time of the surgery. He has remained NWB. Physical exam:  Post op dressing taken down.  Incision is clean, dry and intact. No warmth, erythema, drainage, or signs of infection. Sutures removed without difficulty.  Limited ROM at the ankle due to pain. Minimal swelling. He has some hypersensitivity to touch throughout the foot/ankle. Plan: He will resume boot. Remain NWB LLE x 2 more weeks. Then he can advance as tolerated in the boot. Will increase his Lyrica to 100 mg BID. Gabapentin 300 mg prn also sent to pharmacy. Tylenol refilled. Order placed for PT to start around 4 weeks po. RTC in 4 weeks with Dr. 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  Job Demands: Standing, reaching, lifting, pulling, pushing, carrying, using heavy machinery and power tools  Prior Medical Treatment: Medication, Imaging, Cam boot, Surgery as described above  Current Work Restrictions: Unable to work, disabled     Medical History:   Past Medical History:   Diagnosis Date    Anxiety     Diverticulosis 2017    Per outside Colonoscopy in Care Everywhere    Internal hemorrhoids 2017    per outside colonoscopy     Surgical History:   Albin Barfield  has a past surgical history that includes Skin cancer excision (Right); Skin graft; Colonoscopy (2017); " Arthroscopy of ankle (Left, 3/16/2022); Repair of ligament of ankle (Left, 3/16/2022); and Decompression of nerve (Left, 3/16/2022).    Medications:   Albin has a current medication list which includes the following prescription(s): acetaminophen, acetaminophen, amitriptyline, ascorbic acid (vitamin c), celecoxib, duloxetine, gabapentin, lidocaine hcl 2%, oxycodone, pregabalin, and sildenafil.    Allergies:   Review of patient's allergies indicates:  No Known Allergies     Imaging: MRI Ankle:   1. Complete tear of anterior talofibular ligament and sprain of calcaneofibular ligament.  2. Probable osteochondral injury of lateral talar dome.  Moderate joint effusion.  3. Heterogeneous signal involving the deltoid ligament, possible sprain or contusion.  4. Osseous contusion of medial talus and medial malleolus.  5. Os trigonum with marrow edema at the synchondrosis.    Imaging: MRI Foot:   1. No acute MR abnormalities of the forefoot.  2. Partially visualized osseous contusions of the talus and navicular.    Social History:  lives with their family    Pain:  Current 5/10, worst 10/10, best 2/10   Location: Top of his foot and into the big toe, across the TC joint (points), from lateral malleolus shoots up to the proximal fibular head  Description: Achying/throbbing mostly, big toe in constant pain, occasionally intense sharp/stabbing/immediate pain with the wrong movements; hypersensitivity in some areas (points to the top of the foot)  Aggravating Factors: randomly while sitting, if anything touches or rubs up against him (reports still super sensitive), trying to get comfortable while sleeping (covers bother him or the cold air - has been able to fall asleep, but has been sleeping 6-7 hours of sleep and wakes up a few times due to pain levels increasing),   Alleviating Factors: Staying off of it, removing whatever is touching his foot (I.e. blanket), Rx to some degree    Pt's goals: Return to gainful employment,  able to walk again without difficulty, run, get back to PLOF; reduce hypersensitivity, improve motion    Objective     Observations: Pt to clinic with NWB with scooter. Pt did not bring boot with him today as he was under the impression that he was here for his eval only. Steri strips in place about each surgical site. Appears to be healing well though not fully closed at all incision sites. Pt reports stitches were removed at last MD visit on 3/31/2022. Mild discoloration of the L foot consistent     Palpation: pt ttp to hypersensitivity to light touch to the rearfoot, forefoot; TC joint and distal L LE non specific, plantar surface of the foot     Ankle AROM Right (*) = in degrees Left (*) = in degrees Comments: for all ROM care taken only light ROM, no pushing at all!   Dorsiflexion WNL -19p Painful anterior ankle about the TC joint   Plantarflexion WNL 26p Painful and stiff throughout    Inversion WNL - -   Eversion WNL 5    Great toe extension WNL 32 Marked stiffness reported and pain about the great toe   Ankle PROM Right (*) = in degrees Left (*) = in degrees Comments: NT at this time as this is contraindicated per PT protocol at this time post-operatively     Ankle MMT: Not indicated at this time!     Circumferential measures: marked ttp during testing  Circumferential measures Right in cm Left in cm   Midpoint of G/S complex 36.5 33.4   At [place] NT 27.0   At Metatarsal heads NT 24.0     Functional Job Specific Testing: NT at this time as not indicated, will follow up at a later date when appropriate per post-op protocol    Limitation/Restriction for FOTO Ankle Survey    Therapist reviewed FOTO scores for Albin Barfield on 4/18/2022.   FOTO documents entered into [employer] - see Media section.    Limitation Score: 85%  Goal: 50%        TREATMENT   Treatment Time In: 955am  Treatment Time Out: 1015am  Total Treatment time (time-based codes) separate from Evaluation: 20 minutes    Albin received therapeutic  exercises to develop strength, ROM and flexibility for 20 minutes including:  - Toe curls x15  - Toe extension x15   - Toe spreads x15  - LAQ x15  - SLR flexion x15   - Crunches 3x5     - Planned:   - Submax isometrics in all planes except NO inversion  - Gentle ROM (NOT stretching!) except NO inversion  - Proximal LE, UE and trunk muscle strengthening to prevent deconditioning  - Ice post session with elevation    Home Exercises and Patient Education Provided    Education provided:   - HEP, POC    Written Home Exercises Provided: yes.  Exercises were reviewed and Albin was able to demonstrate them prior to the end of the session.  Albin demonstrated good  understanding of the education provided.     See EMR under Patient Instructions for exercises provided 4/18/2022.    Assessment   Albin is a 41 y.o. male referred to outpatient Physical Therapy with a medical diagnosis of Peroneal neuritis, left; Osteochondritis dissecans of ankle, left; Inversion sprain of ankle, left, sequela.   Mr. 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 He then had skilled PT intervention with no resolution of s/s and subsequently had ATSx on 3/16/2022. Procedure: Left ankle arthroscopy, debridement and microfracture, Left ankle brostrom with artelon reconstuction, Intraoperative use of fluoroscopy <1hr.     Currently protocol as follows:   --Gait training, edema control/desensitization modalities   --ADAT WBAT LLE, immobilization: boot; may initiate boot weaning 10 weeks postop   --AROM/AAROM and gentle strengthening/theraband work, but no PROM/manipulation until 10 weeks postop (5/25/2022)    Pt presented to the clinic today for his initial PT evaluation NWB with blake. Obj measures taken and consistent with referring Dx and post-op status at this time. Pt with ongoing hypersentivitiy which was previously noted prior to Sx. Pt also  with significant atrophy as compared to pre-operatively. Due to marked atrophy as well as hypersensitivity, elevated irritability and severity of s/s, feel as though the pt may be appropriate for a NMES unit in order to help facilitate hypertrophy as well as possibly TENS combo for s/s reduction and desensitization. Feel as though this is not appropriate at this time; however, as the pt progresses in therapy and incision sites heal, this may be an appropriate adjunct to PT intervention. Otherwise, objective measures marked limited today due to post-op protocol.   Primary focus today on patient education regarding protocol, expectations, current HEP, POC planning. Initiated HEP today within post-op protocol guidelines as well as supplemented program with quad, hip flexor, and core strengthening as the pt is currently deconditioned at this time. Feel as though these secondary measures will be crucial for transition to ambulation and functional return as close to PLOF as possible looking long term.     The pt's current impairments include: L ankle pain, hypersensitivity to light touch, marked limited L ankle/foot AROM in all planes of motion, weakness and atrophy noted which has led to functional deficits including inability to ambulate at this time and therefore do any closed chain activity at this time.     Pt prognosis is Good.     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    Plan of care discussed with patient: Yes  Pt's spiritual, cultural and educational needs considered and patient is agreeable to the plan of care and goals as stated below:     Anticipated Barriers for therapy: acuity of current injury, chronicity of current injury and current post-op status    Medical Necessity is demonstrated by the following  History  Co-morbidities and personal  factors that may impact the plan of care Co-morbidities:   current post-op status    Personal Factors:   social background  lifestyle     moderate   Examination  Body Structures and Functions, activity limitations and participation restrictions that may impact the plan of care Body Regions:   lower extremities    Body Systems:    ROM  strength  balance  gait  scar formation    Participation Restrictions:   Working restrictions    Activity limitations:   Learning and applying knowledge  no deficits    General Tasks and Commands  no deficits    Communication  no deficits    Mobility  lifting and carrying objects  walking    Self care  looking after one's health    Domestic Life  assisting others    Interactions/Relationships  no deficits    Life Areas  employment    Community and Social Life  community life  recreation and leisure         high   Clinical Presentation evolving clinical presentation with changing clinical characteristics moderate   Decision Making/ Complexity Score: moderate     Goals:     HEP:   - Pt will become compliant and independent with his initial HEP in 2 weeks to promote decreased pain and improved mobility and strength.   - Pt will become compliant and independent with an updated, progressed HEP in 12 weeks to promote decreased pain and improved mobility and strength as well as prep for discharge from further PT intervention.   Pain:   - Pt to report decrease in pain levels from 10/10 at worse to 8/10 at worse in 2 weeks.  - Pt to report decrease in pain levels from 10/10 at worse to 6/10 at worse in 4 weeks.  - Pt to report decrease in pain levels from 10/10 at worse to 4/10 at worse in 8 weeks.  Impairment level:   - Pt to increase DF ROM to neutral (unable to achieve neutral on eval) by week 2 in order to promote improved ROM and ability to WBAT in boot.   - Pt to increase DF ROM to 10* by week 12 in order to promote return to function with ability to ambulate, squat, and perform stair  negotiation.   - MMT goals will be set when appropriately able to assess; not indicated at this time.   Function level:   - Pt will improve his FOTO score from 85% impairment to 50% improvement to indicate improvement in overall function in 12 weeks.   - Pt will be able to place approx 50% weight through the LLE with boot in place in 2 weeks.   - Pt will be able to place approx 75% weight through the LLE with boot in place in 3 weeks.   - Pt will be able to ambulate with boot, WBAT in 4 weeks with AD as needed to promote energy conservation and community ambulation.   - Pt will be able to transition from boot to WBAT by week 10 post-op.     - RTW goals will be set at future visits when appropriately able to assess baseline measures.     Pt will return to work full duty, full time.    Plan   Plan of care Certification: 4/18/2022 to 7/8/2022.    Outpatient Physical Therapy 2 times weekly for 12 weeks to include the following interventions: Gait Training, Manual Therapy, Moist Heat/ Ice, Neuromuscular Re-ed, Patient Education, Self Care, Therapeutic Activities and Therapeutic Exercise.     Upon discharge from further skilled PT intervention it will determined if the need for a work conditioning program or Functional Capacity Evaluation is required to allow the injured worker to return to work with full potential achieved, continued improvement with body mechanics with advanced functional activities, and further prevention of future work-related injuries.     Maximino Blake, PT  4/18/2022

## 2022-04-21 NOTE — PROGRESS NOTES
[employer] Physical Therapy Daily Treatment Note     Name: Albin Barfield  Clinic Number: [id number]    Therapy Diagnosis: No diagnosis found.  Physician: Amelia Medrano PA-C    Visit Date: 4/21/2022    Physician Orders: PT Eval and Treat   Comments:   S/p Left ankle arthroscopy, debridement and microfracture  Left ankle brostrom with artelon reconstuction  Intraoperative use of fluoroscopy <1hr by Dr. Hager on 3-16-22.  PT: Start date: 4 weeks postop  Rx/protocol and restrictions:   --Gait training, edema control/desensitization modalities  --ADAT WBAT LLE, immobilization: boot; may initiate boot weaning 10 weeks postop   --AROM/AAROM and gentle strengthening/theraband work, but no PROM/manipulation until 10 weeks postop     Medical Diagnosis from Referral:   G57.32 (ICD-10-CM) - Peroneal neuritis, left   M93.272 (ICD-10-CM) - Osteochondritis dissecans of ankle, left   S93.402S (ICD-10-CM) - Inversion sprain of ankle, left, sequela      Evaluation Date: 4/18/2022  Authorization Period Expiration: 3/31/2023  Plan of Care Expiration: 5/27/2022  Progress Note Due: 5/27/2022  Visit # / Visits authorized: 2/12  FOTO: 1st visit,   PTA: 1/5  (# of No Show Appts 0/Number of Cancelled Appts 0)    Time In: 1135  Time Out: 1225  Total Appointment Time (timed & untimed codes): 50 minutes (3 TE)    Precautions: Standard and chronic pain, Sx precautions    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    He was compliant with home exercise program.  Response to previous treatment: no adverse effects  Functional change: none    Pain: 4 or 5/10  Location: dosum of foot as well anterior and lateral ankle and toes    OBJECTIVE     Objective Measures  updated at progress report unless specified.     TREATMENT     Albin received the treatments listed below:      therapeutic exercises to develop strength, endurance, ROM and flexibility for 50 minutes including:    Supine SLR 2x10  SL SLR 2x10  Prone SLR 2x10  Prone HS curls 2x15  Seated LAQ 2x15  AROM in limited DF/PF x30  Lateral weight shifting in standing with boot (as tolerated): x20  Fwd/Bkw weight shifting in standing with boot as tolereated: x20      manual therapy techniques  for 0 minutes, including:        PATIENT EDUCATION AND HOME EXERCISES      Home Exercises Provided and Patient Education Provided     Education provided: Pt was educated on all therapeutic exercises initiated during today's tx visit.     Written Home Exercises Provided: Patient instructed to cont prior HEP. Exercises were reviewed and Albin was able to demonstrate them prior to the end of the session.  Albin demonstrated good  understanding of the education provided. See EMR under Patient Instructions for exercises provided during therapy sessions    ASSESSMENT   Albin is a 41 y.o. male referred to outpatient Physical Therapy with a medical diagnosis of Peroneal neuritis, left; Osteochondritis dissecans of ankle, left; Inversion sprain of ankle, left, sequela.   Mr. 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 He then had skilled PT intervention with no resolution of s/s and subsequently had ATSx on 3/16/2022. Procedure: Left ankle arthroscopy, debridement and microfracture, Left ankle brostrom with artelon reconstuction, Intraoperative use of fluoroscopy <1hr.      Currently protocol as follows:   --Gait training, edema control/desensitization modalities   --ADAT WBAT LLE, immobilization: boot; may initiate boot weaning 10 weeks postop   --AROM/AAROM and gentle strengthening/theraband work, but no PROM/manipulation until 10 weeks  postop (5/25/2022)    Pt presented with L ankle pain upon arrival but provided good to fair tolerance to all therapeutic exercises. Pt reported the most difficulty performing weight shifting in standing secondary to dorsal L foot pain. Tx focused on LLE/hip strengthening coupled with weight acceptance activities to promote proper gait pattern. Will progress as tolerated.     The patient's current job specific task deficits include the following:  L ankle pain, hypersensitivity to light touch, marked limited L ankle/foot AROM in all planes of motion, weakness and atrophy noted which has led to functional deficits including inability to ambulate at this time and therefore do any closed chain activity at this time.     Albin Is progressing well towards his goals.   Pt prognosis is Good.     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    Pt's spiritual, cultural and educational needs considered and pt agreeable to plan of care and goals.     Anticipated barriers to physical therapy: acuity of current injury, chronicity of current injury and current post-op status    Goals:     HEP:   - Pt will become compliant and independent with his initial HEP in 2 weeks to promote decreased pain and improved mobility and strength.   - Pt will become compliant and independent with an updated, progressed HEP in 12 weeks to promote decreased pain and improved mobility and strength as well as prep for discharge from further PT intervention.   Pain:   - Pt to report decrease in pain levels from 10/10 at worse to 8/10 at worse in 2 weeks.  - Pt to report decrease in pain levels from 10/10 at worse to 6/10 at worse in 4 weeks.  - Pt to report decrease in pain levels from 10/10 at  worse to 4/10 at worse in 8 weeks.  Impairment level:   - Pt to increase DF ROM to neutral (unable to achieve neutral on eval) by week 2 in order to promote improved ROM and ability to WBAT in boot.   - Pt to increase DF ROM to 10* by week 12 in order to promote return to function with ability to ambulate, squat, and perform stair negotiation.   - MMT goals will be set when appropriately able to assess; not indicated at this time.   Function level:   - Pt will improve his FOTO score from 85% impairment to 50% improvement to indicate improvement in overall function in 12 weeks.   - Pt will be able to place approx 50% weight through the LLE with boot in place in 2 weeks.   - Pt will be able to place approx 75% weight through the LLE with boot in place in 3 weeks.   - Pt will be able to ambulate with boot, WBAT in 4 weeks with AD as needed to promote energy conservation and community ambulation.   - Pt will be able to transition from boot to WBAT by week 10 post-op.      - RTW goals will be set at future visits when appropriately able to assess baseline measures.      Pt will return to work full duty, full time.    Plan   Plan of care Certification: 4/18/2022 to 7/8/2022.     Outpatient Physical Therapy 2 times weekly for 12 weeks to include the following interventions: Gait Training, Manual Therapy, Moist Heat/ Ice, Neuromuscular Re-ed, Patient Education, Self Care, Therapeutic Activities and Therapeutic Exercise.      Upon discharge from further skilled PT intervention it will determined if the need for a work conditioning program or Functional Capacity Evaluation is required to allow the injured worker to return to work with full potential achieved, continued improvement with body mechanics with advanced functional activities, and further prevention of future work-related injuries.       Frankie Reyes, PTA   4/21/2022

## 2022-04-25 ENCOUNTER — PATIENT MESSAGE (OUTPATIENT)
Dept: ORTHOPEDICS | Facility: CLINIC | Age: 42
End: 2022-04-25
Payer: OTHER MISCELLANEOUS

## 2022-04-25 ENCOUNTER — CLINICAL SUPPORT (OUTPATIENT)
Dept: REHABILITATION | Facility: HOSPITAL | Age: 42
End: 2022-04-25
Attending: PHYSICIAN ASSISTANT
Payer: OTHER MISCELLANEOUS

## 2022-04-25 DIAGNOSIS — Z47.89 SURGICAL AFTERCARE, MUSCULOSKELETAL SYSTEM: ICD-10-CM

## 2022-04-25 DIAGNOSIS — G89.18 POST-OP PAIN: Primary | ICD-10-CM

## 2022-04-25 DIAGNOSIS — G89.18 POST-OP PAIN: ICD-10-CM

## 2022-04-25 DIAGNOSIS — M79.672 LEFT FOOT PAIN: Primary | ICD-10-CM

## 2022-04-25 PROCEDURE — 97110 THERAPEUTIC EXERCISES: CPT | Mod: PN

## 2022-04-25 RX ORDER — PREGABALIN 150 MG/1
150 CAPSULE ORAL 2 TIMES DAILY
Qty: 60 CAPSULE | Refills: 0 | Status: SHIPPED | OUTPATIENT
Start: 2022-04-25 | End: 2022-05-02 | Stop reason: SDUPTHER

## 2022-04-25 NOTE — PROGRESS NOTES
[employer] Physical Therapy Daily Treatment Note     Name: Albin Barfield  Clinic Number: [id number]    Therapy Diagnosis:   Encounter Diagnoses   Name Primary?    Left foot pain Yes    Surgical aftercare, musculoskeletal system      Physician: Amelia Medrano PA-C    Visit Date: 4/25/2022    Physician Orders: PT Eval and Treat   Comments:   S/p Left ankle arthroscopy, debridement and microfracture  Left ankle brostrom with artelon reconstuction  Intraoperative use of fluoroscopy <1hr by Dr. Hager on 3-16-22.  PT: Start date: 4 weeks postop  Rx/protocol and restrictions:   --Gait training, edema control/desensitization modalities  --ADAT WBAT LLE, immobilization: boot; may initiate boot weaning 10 weeks postop   --AROM/AAROM and gentle strengthening/theraband work, but no PROM/manipulation until 10 weeks postop     Medical Diagnosis from Referral:   G57.32 (ICD-10-CM) - Peroneal neuritis, left   M93.272 (ICD-10-CM) - Osteochondritis dissecans of ankle, left   S93.402S (ICD-10-CM) - Inversion sprain of ankle, left, sequela      Evaluation Date: 4/18/2022  Authorization Period Expiration: 3/31/2023  Plan of Care Expiration: 5/27/2022  Progress Note Due: 5/27/2022  Visit # / Visits authorized: 3/12  FOTO: 1st visit,   PTA: 0/5  (# of No Show Appts 0/Number of Cancelled Appts 0)    Time In: 12:18 pm   Time Out: 1:15 pm  Total Appointment Time (timed & untimed codes): 57 minutes (4 TE)    Precautions: Standard and chronic pain, Sx precautions    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 Had pain after WB in boot last session but thinks that will be part of the process.  He was compliant with home exercise program.  Response to previous treatment: no adverse  effects  Functional change: none    Pain: 4 or 5/10  Location: dosum of foot as well anterior and lateral ankle and toes    OBJECTIVE     Objective Measures updated at progress report unless specified.     TREATMENT     Albin received the treatments listed below:      therapeutic exercises to develop strength, endurance, ROM and flexibility for 57 minutes including:    Supine SLR +4x10  SL SLR 2x10  Prone SLR +3x10  Prone HS curls 2x15  Seated LAQ 2x15-NP  AROM in limited DF/PF x30  Lateral weight shifting in standing with boot (as tolerated): x10  Fwd/Bkw weight shifting in standing with boot as tolereated: x10      manual therapy techniques  for 0 minutes, including:        PATIENT EDUCATION AND HOME EXERCISES      Home Exercises Provided and Patient Education Provided     Education provided: Pt was educated on all therapeutic exercises initiated during today's tx visit.     Written Home Exercises Provided: Patient instructed to cont prior HEP. Exercises were reviewed and Albin was able to demonstrate them prior to the end of the session.  lAbin demonstrated good  understanding of the education provided. See EMR under Patient Instructions for exercises provided during therapy sessions    ASSESSMENT   Albin is a 41 y.o. male referred to outpatient Physical Therapy with a medical diagnosis of Peroneal neuritis, left; Osteochondritis dissecans of ankle, left; Inversion sprain of ankle, left, sequela.   Mr. 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 He then had skilled PT intervention with no resolution of s/s and subsequently had ATSx on 3/16/2022. Procedure: Left ankle arthroscopy, debridement and microfracture, Left ankle brostrom with artelon reconstuction, Intraoperative use of fluoroscopy <1hr.      Currently protocol as follows:   --Gait training, edema control/desensitization modalities   --ADAT WBAT  LLE, immobilization: boot; may initiate boot weaning 10 weeks postop   --AROM/AAROM and gentle strengthening/theraband work, but no PROM/manipulation until 10 weeks postop (5/25/2022)    Pt presented with L ankle pain upon arrival but provided good to fair tolerance to all therapeutic exercises. Pt reported the most difficulty performing weight shifting in standing secondary to dorsal L foot pain. Tx focused on LLE/hip strengthening coupled with weight acceptance activities to promote proper gait pattern. Will progress as tolerated.     Pt presents with reports of continued hypersensitivity in dorsum of foot. Continued with NWB L LE strengthening and weight acceptance in boot. Continues to remain painful with light WB in foot and only 10 reps performed weight shift side to side and fwd/bwd. Progress pt as tolerated.     The patient's current job specific task deficits include the following:  L ankle pain, hypersensitivity to light touch, marked limited L ankle/foot AROM in all planes of motion, weakness and atrophy noted which has led to functional deficits including inability to ambulate at this time and therefore do any closed chain activity at this time.     Albin Is progressing well towards his goals.   Pt prognosis is Good.     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    Pt's spiritual, cultural and educational needs considered and pt agreeable to plan of care and goals.     Anticipated barriers to physical therapy: acuity of current injury, chronicity of current injury and current post-op status    Goals:     HEP:   - Pt will become compliant and independent with his initial HEP in 2 weeks to promote decreased pain and improved mobility and strength.    - Pt will become compliant and independent with an updated, progressed HEP in 12 weeks to promote decreased pain and improved mobility and strength as well as prep for discharge from further PT intervention.   Pain:   - Pt to report decrease in pain levels from 10/10 at worse to 8/10 at worse in 2 weeks.  - Pt to report decrease in pain levels from 10/10 at worse to 6/10 at worse in 4 weeks.  - Pt to report decrease in pain levels from 10/10 at worse to 4/10 at worse in 8 weeks.  Impairment level:   - Pt to increase DF ROM to neutral (unable to achieve neutral on eval) by week 2 in order to promote improved ROM and ability to WBAT in boot.   - Pt to increase DF ROM to 10* by week 12 in order to promote return to function with ability to ambulate, squat, and perform stair negotiation.   - MMT goals will be set when appropriately able to assess; not indicated at this time.   Function level:   - Pt will improve his FOTO score from 85% impairment to 50% improvement to indicate improvement in overall function in 12 weeks.   - Pt will be able to place approx 50% weight through the LLE with boot in place in 2 weeks.   - Pt will be able to place approx 75% weight through the LLE with boot in place in 3 weeks.   - Pt will be able to ambulate with boot, WBAT in 4 weeks with AD as needed to promote energy conservation and community ambulation.   - Pt will be able to transition from boot to WBAT by week 10 post-op.      - RTW goals will be set at future visits when appropriately able to assess baseline measures.      Pt will return to work full duty, full time.    Plan   Plan of care Certification: 4/18/2022 to 7/8/2022.     Outpatient Physical Therapy 2 times weekly for 12 weeks to include the following interventions: Gait Training, Manual Therapy, Moist Heat/ Ice, Neuromuscular Re-ed, Patient Education, Self Care, Therapeutic Activities and Therapeutic Exercise.      Upon discharge from further skilled PT intervention it  will determined if the need for a work conditioning program or Functional Capacity Evaluation is required to allow the injured worker to return to work with full potential achieved, continued improvement with body mechanics with advanced functional activities, and further prevention of future work-related injuries.       TISHA SPARROW, PT   4/25/2022

## 2022-04-26 RX ORDER — OXYCODONE HYDROCHLORIDE 5 MG/1
5 TABLET ORAL EVERY 6 HOURS PRN
Qty: 28 TABLET | Refills: 0 | Status: SHIPPED | OUTPATIENT
Start: 2022-04-26 | End: 2022-05-02 | Stop reason: SDUPTHER

## 2022-04-28 ENCOUNTER — CLINICAL SUPPORT (OUTPATIENT)
Dept: REHABILITATION | Facility: HOSPITAL | Age: 42
End: 2022-04-28
Attending: PHYSICIAN ASSISTANT
Payer: OTHER MISCELLANEOUS

## 2022-04-28 ENCOUNTER — OFFICE VISIT (OUTPATIENT)
Dept: ORTHOPEDICS | Facility: CLINIC | Age: 42
End: 2022-04-28
Payer: OTHER MISCELLANEOUS

## 2022-04-28 ENCOUNTER — HOSPITAL ENCOUNTER (OUTPATIENT)
Dept: RADIOLOGY | Facility: HOSPITAL | Age: 42
Discharge: HOME | End: 2022-04-28
Attending: ORTHOPAEDIC SURGERY
Payer: OTHER MISCELLANEOUS

## 2022-04-28 DIAGNOSIS — S93.402S INVERSION SPRAIN OF ANKLE, LEFT, SEQUELA: ICD-10-CM

## 2022-04-28 DIAGNOSIS — M79.672 LEFT FOOT PAIN: Primary | ICD-10-CM

## 2022-04-28 DIAGNOSIS — G57.32 PERONEAL NEURITIS, LEFT: Primary | ICD-10-CM

## 2022-04-28 DIAGNOSIS — M93.272 OSTEOCHONDRITIS DISSECANS OF ANKLE, LEFT: ICD-10-CM

## 2022-04-28 DIAGNOSIS — Z47.89 SURGICAL AFTERCARE, MUSCULOSKELETAL SYSTEM: ICD-10-CM

## 2022-04-28 DIAGNOSIS — Z98.890 HISTORY OF ANKLE SURGERY: ICD-10-CM

## 2022-04-28 PROCEDURE — 73610 X-RAY EXAM OF ANKLE: CPT | Mod: TC,LT

## 2022-04-28 PROCEDURE — 99999 PR PBB SHADOW E&M-EST. PATIENT-LVL II: ICD-10-PCS | Mod: PBBFAC,,, | Performed by: ORTHOPAEDIC SURGERY

## 2022-04-28 PROCEDURE — 99024 PR POST-OP FOLLOW-UP VISIT: ICD-10-PCS | Mod: ,,, | Performed by: ORTHOPAEDIC SURGERY

## 2022-04-28 PROCEDURE — 97110 THERAPEUTIC EXERCISES: CPT | Mod: PN

## 2022-04-28 PROCEDURE — 99214 OFFICE O/P EST MOD 30 MIN: CPT | Mod: 24,S$GLB,, | Performed by: ORTHOPAEDIC SURGERY

## 2022-04-28 PROCEDURE — 73610 X-RAY EXAM OF ANKLE: CPT | Mod: 26,LT,, | Performed by: RADIOLOGY

## 2022-04-28 PROCEDURE — 73610 XR ANKLE COMPLETE 3 VIEW LEFT: ICD-10-PCS | Mod: 26,LT,, | Performed by: RADIOLOGY

## 2022-04-28 PROCEDURE — 99999 PR PBB SHADOW E&M-EST. PATIENT-LVL II: CPT | Mod: PBBFAC,,, | Performed by: ORTHOPAEDIC SURGERY

## 2022-04-28 PROCEDURE — 99024 POSTOP FOLLOW-UP VISIT: CPT | Mod: ,,, | Performed by: ORTHOPAEDIC SURGERY

## 2022-04-28 PROCEDURE — 99214 PR OFFICE/OUTPT VISIT, EST, LEVL IV, 30-39 MIN: ICD-10-PCS | Mod: 24,S$GLB,, | Performed by: ORTHOPAEDIC SURGERY

## 2022-04-28 NOTE — LETTER
April 28, 2022        Jackie Mora, PT             [facility]  [address], [address]  [facility] [address]  Phone: [phone]   Patient: Albin Barfield   MR Number: [id number]   YOB: 1980   Date of Visit: 4/28/2022     Dear Dr. Mora,     Albin Barfield was seen in clinic 04/28/2022.  The following updated PT orders apply to their ongoing care.    Desensitization modalities  Gentle hip and low back modalities - consider dry needling  Start ankle ROM/AROM/AAROM.    I appreciate your assistance in their rehabilitation.  Please don't hesitate to call or reach out with questions/concerns.    Sincerely,    Mitali Hager MD  [phone]  Foot & Ankle Orthopedic Surgery  04/28/2022

## 2022-04-28 NOTE — PROGRESS NOTES
[employer] Physical Therapy Daily Treatment Note     Name: Albin Barfield  Clinic Number: [id number]    Therapy Diagnosis:   Encounter Diagnoses   Name Primary?    Left foot pain Yes    Surgical aftercare, musculoskeletal system      Physician: Amelia Medrano PA-C    Visit Date: 4/28/2022    Physician Orders: PT Eval and Treat   Comments:   S/p Left ankle arthroscopy, debridement and microfracture  Left ankle brostrom with artelon reconstuction  Intraoperative use of fluoroscopy <1hr by Dr. Hager on 3-16-22.  PT: Start date: 4 weeks postop  Rx/protocol and restrictions:   --Gait training, edema control/desensitization modalities  --ADAT WBAT LLE, immobilization: boot; may initiate boot weaning 10 weeks postop   --AROM/AAROM and gentle strengthening/theraband work, but no PROM/manipulation until 10 weeks postop     Medical Diagnosis from Referral:   G57.32 (ICD-10-CM) - Peroneal neuritis, left   M93.272 (ICD-10-CM) - Osteochondritis dissecans of ankle, left   S93.402S (ICD-10-CM) - Inversion sprain of ankle, left, sequela      Evaluation Date: 4/18/2022  Authorization Period Expiration: 3/31/2023  Plan of Care Expiration: 5/27/2022  Progress Note Due: 5/27/2022  Visit # / Visits authorized: 3/12  FOTO: 1st visit,   PTA: 0/5  (# of No Show Appts 0/Number of Cancelled Appts 0)    Time In: 145pm   Time Out: 245pm  Total Appointment Time (timed & untimed codes): 60 minutes (4TE)    Precautions: Standard and chronic pain, Sx precautions    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 Did see his referring provider since his last visit and reports that he needs to start putting more weight through his foot while in his boot. Also reports  "that he slept with his foot elevated all night last night and reports marked increased lower back pain and spasms. Reports that he can't do much right now because of his lower back pain.   He was compliant with home exercise program.  Response to previous treatment: no adverse effects  Functional change: none    Pain: 4 or 5/10  Location: dosum of foot as well anterior and lateral ankle and toes    OBJECTIVE     Objective Measures updated at progress report unless specified.     TREATMENT     Albin received the treatments listed below:      therapeutic exercises to develop strength, endurance, ROM and flexibility for 60 minutes including:    MH to the LB in hooklying x5'  Supine SLR x10 (painful; adjusted to active stabilization with B UE for reduced pain)  LTR x2'  DKTC 10" hold 5  Upper abdominal crunch x25 10DB  Lateral crunch 2x10 each  Prone press up 3" hold x10  [STM to lumbar paraspinals x5']    SL SLR 2x10  Prone SLR +3x10  Prone HS curls 2x15   Seated LAQ 2x15   AROM in limited DF/PF x30  Lateral weight shifting in standing with boot (as tolerated): x1'  Fwd/Bkw weight shifting in standing with boot as tolereated: x1'  L ankle place ups to 4" step with fwd weight shift: x1'  R LE lift offs to SLS tolerance with HHA PRN: x1'    manual therapy techniques  for 0 minutes, including:    PATIENT EDUCATION AND HOME EXERCISES      Home Exercises Provided and Patient Education Provided     Education provided: Pt was educated on all therapeutic exercises initiated during today's tx visit.     Written Home Exercises Provided: Patient instructed to cont prior HEP. Exercises were reviewed and Albin was able to demonstrate them prior to the end of the session.  Albin demonstrated good  understanding of the education provided. See EMR under Patient Instructions for exercises provided during therapy sessions    ASSESSMENT   Albin is a 41 y.o. male referred to outpatient Physical Therapy with a medical diagnosis " of Peroneal neuritis, left; Osteochondritis dissecans of ankle, left; Inversion sprain of ankle, left, sequela.   Mr. 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 He then had skilled PT intervention with no resolution of s/s and subsequently had ATSx on 3/16/2022. Procedure: Left ankle arthroscopy, debridement and microfracture, Left ankle brostrom with artelon reconstuction, Intraoperative use of fluoroscopy <1hr.      Currently protocol as follows:   --Gait training, edema control/desensitization modalities   --ADAT WBAT LLE, immobilization: boot; may initiate boot weaning 10 weeks postop   --AROM/AAROM and gentle strengthening/theraband work, but no PROM/manipulation until 10 weeks postop (5/25/2022)    Pt presented with L ankle pain upon arrival but provided good to fair tolerance to all therapeutic exercises. Pt reported the most difficulty performing weight shifting in standing secondary to dorsal L foot pain. Tx focused on LLE/hip strengthening coupled with weight acceptance activities to promote proper gait pattern. Will progress as tolerated.     Pt presents with reports of continued hypersensitivity in dorsum of foot with no new c/o regarding his foot; however, he did report significant c/o LBP in which case he almost did not come to therapy today due to elevated pain. Due to this adjusted visit activities marked today per activity log in order to reduce pain in order to allow him to continue with PT visit. Pt did note significant relief of LBP post session today.   Did report to session NWB L LE with scooter. Pt did bring his boot to session and utilized with WB activities focusing on improving WB status statically and dynamically. Did ask pt to also bring in B axillary crutches to his next visit. Planning to initiate B axillary crutch training as he is able to tolerate. Progress pt as tolerated.     The  patient's current job specific task deficits include the following:  L ankle pain, hypersensitivity to light touch, marked limited L ankle/foot AROM in all planes of motion, weakness and atrophy noted which has led to functional deficits including inability to ambulate at this time and therefore do any closed chain activity at this time.     Albin Is progressing well towards his goals.   Pt prognosis is Good.     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    Pt's spiritual, cultural and educational needs considered and pt agreeable to plan of care and goals.     Anticipated barriers to physical therapy: acuity of current injury, chronicity of current injury and current post-op status    Goals:     HEP:   - Pt will become compliant and independent with his initial HEP in 2 weeks to promote decreased pain and improved mobility and strength.   - Pt will become compliant and independent with an updated, progressed HEP in 12 weeks to promote decreased pain and improved mobility and strength as well as prep for discharge from further PT intervention.   Pain:   - Pt to report decrease in pain levels from 10/10 at worse to 8/10 at worse in 2 weeks.  - Pt to report decrease in pain levels from 10/10 at worse to 6/10 at worse in 4 weeks.  - Pt to report decrease in pain levels from 10/10 at worse to 4/10 at worse in 8 weeks.  Impairment level:   - Pt to increase DF ROM to neutral (unable to achieve neutral on eval) by week 2 in order to promote improved ROM and ability to WBAT in boot.   - Pt to increase DF ROM to 10* by week 12 in order to promote return to function with ability to ambulate, squat, and perform stair negotiation.   - MMT goals will be set when  appropriately able to assess; not indicated at this time.   Function level:   - Pt will improve his FOTO score from 85% impairment to 50% improvement to indicate improvement in overall function in 12 weeks.   - Pt will be able to place approx 50% weight through the LLE with boot in place in 2 weeks.   - Pt will be able to place approx 75% weight through the LLE with boot in place in 3 weeks.   - Pt will be able to ambulate with boot, WBAT in 4 weeks with AD as needed to promote energy conservation and community ambulation.   - Pt will be able to transition from boot to WBAT by week 10 post-op.      - RTW goals will be set at future visits when appropriately able to assess baseline measures.      Pt will return to work full duty, full time.    Plan   Plan of care Certification: 4/18/2022 to 7/8/2022.     Outpatient Physical Therapy 2 times weekly for 12 weeks to include the following interventions: Gait Training, Manual Therapy, Moist Heat/ Ice, Neuromuscular Re-ed, Patient Education, Self Care, Therapeutic Activities and Therapeutic Exercise.      Upon discharge from further skilled PT intervention it will determined if the need for a work conditioning program or Functional Capacity Evaluation is required to allow the injured worker to return to work with full potential achieved, continued improvement with body mechanics with advanced functional activities, and further prevention of future work-related injuries.       Maximino Blake, PT   5/2/2022

## 2022-04-28 NOTE — PROGRESS NOTES
"Subjective:   Chief complaint: Follow-up left ankle.  New problem: worsening nerve pain    DOS 3/16  Procedure:   Left ankle arthroscopy, debridement and microfracture  Left ankle brostrom with artelon reconstruction  Left SPN/DPN decompression (Dr. Huertas)    HPI:   Albin Barfield is a 41 y.o. male who presents today for follow-up.  Pain is 4/10 NWM and 8/10 WB.  Pain primarily dorsum of foot but radiates throughout calf lateral > medial.    ROS:  Musculoskeletal: per HPI     Objective:   Exam:  There were no vitals filed for this visit.  General: No acute distress, well-appearing  Musculoskeletal: Standing examination deferred.      Incision about ankle benign.  Incision about dorsal foot and lateral calf with some scabbing present.  No drainage present.  There is slight postop swelling but there is shiny and purplish (vasomotor changes?) of foot.    Fires TA/GSC/PTT/peroneals but guarded.  SILT SP/DP/PT with paresthesias. Significant allodynia about dorsal foot.    Imaging:  Radiographs were ordered and independently interpreted by me.    3v standing ankle demonstrate maintained ankle mortis.  Tunnels from ankle recon visible.    Additional testing/results reviewed:  None      Assessment:     1. Peroneal neuritis, left    2. History of ankle surgery    3. Inversion sprain of ankle, left, sequela        Patient is seen for a new problem and a postop visit (<90 days postop) with uncertain prognosis.    Data: 1 results independently interpreted    Treatment plan: Risk of morbidity from treatment plan secondary to potential superimposed CRPS    6 weeks postop with stable ankle and minimal ankle pain but evolving clinical picture concerning for more than just post-decompression neuritis.  Seems like more than "nerve waking up."    Discussed importance of early intervention as well as continued therapy to avoid disuse.       Plan:        Weight bearing: Advance to weight bearing as tolerated in boot as pain " allows   Immobilization: Tall boot when mobilizing; may remove for sleep and hygiene   Therapy: Continue PT- note sent to PT with updates   Blood clot prevention: Ok to discontinue aspirin, unless this was already prescribed by PCP   Wound care: None; emphasized gentle desensitization massages to the scar   Showering: Ok to shower, but do not submerge in tub/water until scabs resolved.   Pain meds: Continue gabapentin and Continue multimodal regimen; will keep narcotic unchanged x3 more week and then re-evaluate   Nutrition: Recommend daily multivitamin and balanced diet   Follow-up: 6 weeks with no x-rays needed    Work status: continue out of work pending ongoing treatment and workup for possible CRPS   Ambulatory referral Dr. Nagy; work comp notified       Orders Placed This Encounter   Procedures    Ambulatory referral/consult to Pain Clinic     Standing Status:   Future     Standing Expiration Date:   5/28/2023     Referral Priority:   Routine     Referral Type:   Consultation     Referral Reason:   Specialty Services Required     Referred to Provider:   Kenzie Nagy MD     Requested Specialty:   Pain Medicine     Number of Visits Requested:   1       Past Medical History:   Diagnosis Date    Anxiety     Diverticulosis 2017    Per outside Colonoscopy in Care Everywhere    Internal hemorrhoids 2017    per outside colonoscopy       Past Surgical History:   Procedure Laterality Date    ARTHROSCOPY OF ANKLE Left 3/16/2022    Procedure: ARTHROSCOPY, ANKLE;  Surgeon: Mitali Hager MD;  Location: [facility] OR;  Service: Orthopedics;  Laterality: Left;  Single shot Post op - ask Dr. Huertas      COLONOSCOPY  2017    DECOMPRESSION OF NERVE Left 3/16/2022    Procedure: DECOMPRESSION, NERVE;  Surgeon: Juan M Huertas DPM;  Location: [facility] OR;  Service: Podiatry;  Laterality: Left;    REPAIR OF LIGAMENT OF ANKLE Left 3/16/2022    Procedure: REPAIR, LIGAMENT, ANKLE;  Surgeon: Mitali Hager MD;  Location: [facility]  OR;  Service: Orthopedics;  Laterality: Left;    SKIN CANCER EXCISION Right     taken out at age 10    SKIN GRAFT      age 10 - skin cancer removal       Family History   Problem Relation Age of Onset    No Known Problems Father     No Known Problems Brother        Social History     Socioeconomic History    Marital status: Single   Tobacco Use    Smoking status: Former Smoker     Years: 20.00     Types: Cigarettes     Quit date: 2022     Years since quittin.2    Smokeless tobacco: Current User   Substance and Sexual Activity    Alcohol use: Yes     Comment: social     Drug use: Yes     Frequency: 7.0 times per week     Types: Marijuana     Comment: Every day    Sexual activity: Yes     Partners: Female   Social History Narrative    ** Merged History Encounter **

## 2022-05-02 ENCOUNTER — CLINICAL SUPPORT (OUTPATIENT)
Dept: REHABILITATION | Facility: HOSPITAL | Age: 42
End: 2022-05-02
Attending: PHYSICIAN ASSISTANT
Payer: OTHER MISCELLANEOUS

## 2022-05-02 DIAGNOSIS — G89.18 POST-OP PAIN: ICD-10-CM

## 2022-05-02 DIAGNOSIS — Z47.89 SURGICAL AFTERCARE, MUSCULOSKELETAL SYSTEM: ICD-10-CM

## 2022-05-02 DIAGNOSIS — M79.672 LEFT FOOT PAIN: Primary | ICD-10-CM

## 2022-05-02 PROCEDURE — 97110 THERAPEUTIC EXERCISES: CPT | Mod: PN

## 2022-05-02 RX ORDER — ACETAMINOPHEN 500 MG
1000 TABLET ORAL EVERY 8 HOURS
Qty: 90 TABLET | Refills: 1 | Status: SHIPPED | OUTPATIENT
Start: 2022-05-02

## 2022-05-02 RX ORDER — PREGABALIN 150 MG/1
150 CAPSULE ORAL 2 TIMES DAILY
Qty: 60 CAPSULE | Refills: 0 | Status: SHIPPED | OUTPATIENT
Start: 2022-05-02 | End: 2022-06-02 | Stop reason: SDUPTHER

## 2022-05-02 RX ORDER — OXYCODONE HYDROCHLORIDE 5 MG/1
5 TABLET ORAL EVERY 6 HOURS PRN
Qty: 28 TABLET | Refills: 0 | Status: SHIPPED | OUTPATIENT
Start: 2022-05-03 | End: 2022-05-09 | Stop reason: SDUPTHER

## 2022-05-02 NOTE — TELEPHONE ENCOUNTER
Refill reviewed and not appropriate until 5/3.  Please inform patient that will refill will need to last until 5/10 and I strongly urge working on weaning as we will start decreasing dose weekly starting 8 weeks postop.

## 2022-05-02 NOTE — PROGRESS NOTES
Workers' [employer] Physical Therapy Daily Treatment Note     Name: Albin Barfield  Clinic Number: [id number]    Therapy Diagnosis:   Encounter Diagnoses   Name Primary?    Left foot pain Yes    Surgical aftercare, musculoskeletal system      Physician: Amelia Medrano PA-C    Visit Date: 5/2/2022    Physician Orders: PT Eval and Treat   Comments:   S/p Left ankle arthroscopy, debridement and microfracture  Left ankle brostrom with artelon reconstuction  Intraoperative use of fluoroscopy <1hr by Dr. Hager on 3-16-22.  PT: Start date: 4 weeks postop  Rx/protocol and restrictions:   --Gait training, edema control/desensitization modalities  --ADAT WBAT LLE, immobilization: boot; may initiate boot weaning 10 weeks postop   --AROM/AAROM and gentle strengthening/theraband work, but no PROM/manipulation until 10 weeks postop     Medical Diagnosis from Referral:   G57.32 (ICD-10-CM) - Peroneal neuritis, left   M93.272 (ICD-10-CM) - Osteochondritis dissecans of ankle, left   S93.402S (ICD-10-CM) - Inversion sprain of ankle, left, sequela      Evaluation Date: 4/18/2022  Authorization Period Expiration: 3/31/2023  Plan of Care Expiration: 5/27/2022  Progress Note Due: 5/27/2022  Visit # / Visits authorized: 5/12  FOTO: 1st visit,   PTA: 0/5  (# of No Show Appts 0/Number of Cancelled Appts 0)    Time In: 1:00 pm   Time Out: 1:45 pm  Total Appointment Time (timed & untimed codes): 45 minutes (3TE)    Precautions: Standard and chronic pain, Sx precautions    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 Did see his referring provider since his last visit and reports that he needs to start putting more weight through his foot while in his boot. Also  "reports that he slept with his foot elevated all night last night and reports marked increased lower back pain and spasms. Reports that he can't do much right now because of his lower back pain.   He was compliant with home exercise program.  Response to previous treatment: no adverse effects  Functional change: none    Pain: 4 or 5/10  Location: dosum of foot as well anterior and lateral ankle and toes    OBJECTIVE     Objective Measures updated at progress report unless specified.     TREATMENT     Albin received the treatments listed below:      therapeutic exercises (bold exercises performed) to develop strength, endurance, ROM and flexibility for 45 minutes including:  Supine SLR flexion 2x10  Sidelying SLR 2x10  Prone SLR 2x10  Prone Hip ext 2x10 ea   Prone HS curls 2x15   Seated LAQ 2x10  AROM in limited DF/PF x20  Lateral weight shifting in standing with boot (as tolerated): 2x1'  Fwd/Bkw weight shifting in standing with boot as tolereated: x1'  L ankle place ups to 4" step with fwd weight shift: x 90 sec  R LE lift offs to SLS tolerance with HHA PRN: x1'    manual therapy techniques  for 0 minutes, including:    PATIENT EDUCATION AND HOME EXERCISES      Home Exercises Provided and Patient Education Provided     Education provided: Pt was educated on all therapeutic exercises initiated during today's tx visit.     Written Home Exercises Provided: Patient instructed to cont prior HEP. Exercises were reviewed and Albin was able to demonstrate them prior to the end of the session.  Albin demonstrated good  understanding of the education provided. See EMR under Patient Instructions for exercises provided during therapy sessions    ASSESSMENT   Albin is a 41 y.o. male referred to outpatient Physical Therapy with a medical diagnosis of Peroneal neuritis, left; Osteochondritis dissecans of ankle, left; Inversion sprain of ankle, left, sequela.   Mr. Barfield originally injured his ankle in September of " 2021 when he fell from roughly 1-2 feet out of a bucket while working to cut/trim trees. He then had skilled PT intervention with no resolution of s/s and subsequently had ATSx on 3/16/2022. Procedure: Left ankle arthroscopy, debridement and microfracture, Left ankle brostrom with artelon reconstuction, Intraoperative use of fluoroscopy <1hr.      Currently protocol as follows:   --Gait training, edema control/desensitization modalities   --ADAT WBAT LLE, immobilization: boot; may initiate boot weaning 10 weeks postop   --AROM/AAROM and gentle strengthening/theraband work, but no PROM/manipulation until 10 weeks postop (5/25/2022)    Pt presented with L ankle pain upon arrival but provided good to fair tolerance to all therapeutic exercises. Pt reported the most difficulty performing weight shifting in standing secondary to dorsal L foot pain. Tx focused on LLE/hip strengthening coupled with weight acceptance activities to promote proper gait pattern. Will progress as tolerated.     Pt presents with reports of continued hypersensitivity in dorsum of foot with no new c/o regarding his foot; however, he did report significant c/o LBP in which case he almost did not come to therapy today due to elevated pain. Due to this adjusted visit activities marked today per activity log in order to reduce pain in order to allow him to continue with PT visit. Pt did note significant relief of LBP post session today.   Did report to session NWB L LE with scooter. Pt did bring his boot to session and utilized with WB activities focusing on improving WB status statically and dynamically. Did ask pt to also bring in B axillary crutches to his next visit. Planning to initiate B axillary crutch training as he is able to tolerate. Progress pt as tolerated.     Pt currently 6.5 weeks post op and reports no LBP today. Continued to report hypersensitivity to light touch and difficulty WB in boot. Pt did not bring axillary crutches to clinic  due to difficulty navigating scooter and crutches but primary PT instructed pt to call clinic when he arrives for his visit on 5/4/22 so therapist can assist him with his crutches. Decreased reps of AROM df/PF due to anterior ankle pain. Continued with WB in boot with HHA to offload as needed. Planning to initiate B axillary crutch training as he is able to tolerate    The patient's current job specific task deficits include the following:  L ankle pain, hypersensitivity to light touch, marked limited L ankle/foot AROM in all planes of motion, weakness and atrophy noted which has led to functional deficits including inability to ambulate at this time and therefore do any closed chain activity at this time.     Albin Is progressing well towards his goals.   Pt prognosis is Good.     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    Pt's spiritual, cultural and educational needs considered and pt agreeable to plan of care and goals.     Anticipated barriers to physical therapy: acuity of current injury, chronicity of current injury and current post-op status    Goals:     HEP:   - Pt will become compliant and independent with his initial HEP in 2 weeks to promote decreased pain and improved mobility and strength.   - Pt will become compliant and independent with an updated, progressed HEP in 12 weeks to promote decreased pain and improved mobility and strength as well as prep for discharge from further PT intervention.   Pain:   - Pt to report decrease in pain levels from 10/10 at worse to 8/10 at worse in 2 weeks.  - Pt to report decrease in pain levels from 10/10 at worse to 6/10 at worse in 4 weeks.  - Pt to report decrease in pain levels from 10/10 at worse to  4/10 at worse in 8 weeks.  Impairment level:   - Pt to increase DF ROM to neutral (unable to achieve neutral on eval) by week 2 in order to promote improved ROM and ability to WBAT in boot.   - Pt to increase DF ROM to 10* by week 12 in order to promote return to function with ability to ambulate, squat, and perform stair negotiation.   - MMT goals will be set when appropriately able to assess; not indicated at this time.   Function level:   - Pt will improve his FOTO score from 85% impairment to 50% improvement to indicate improvement in overall function in 12 weeks.   - Pt will be able to place approx 50% weight through the LLE with boot in place in 2 weeks.   - Pt will be able to place approx 75% weight through the LLE with boot in place in 3 weeks.   - Pt will be able to ambulate with boot, WBAT in 4 weeks with AD as needed to promote energy conservation and community ambulation.   - Pt will be able to transition from boot to WBAT by week 10 post-op.      - RTW goals will be set at future visits when appropriately able to assess baseline measures.      Pt will return to work full duty, full time.    Plan   Plan of care Certification: 4/18/2022 to 7/8/2022.     Outpatient Physical Therapy 2 times weekly for 12 weeks to include the following interventions: Gait Training, Manual Therapy, Moist Heat/ Ice, Neuromuscular Re-ed, Patient Education, Self Care, Therapeutic Activities and Therapeutic Exercise.      Upon discharge from further skilled PT intervention it will determined if the need for a work conditioning program or Functional Capacity Evaluation is required to allow the injured worker to return to work with full potential achieved, continued improvement with body mechanics with advanced functional activities, and further prevention of future work-related injuries.       TISHA SPARROW, PT   5/2/2022

## 2022-05-04 ENCOUNTER — CLINICAL SUPPORT (OUTPATIENT)
Dept: REHABILITATION | Facility: HOSPITAL | Age: 42
End: 2022-05-04
Attending: PHYSICIAN ASSISTANT
Payer: OTHER MISCELLANEOUS

## 2022-05-04 DIAGNOSIS — M79.672 LEFT FOOT PAIN: Primary | ICD-10-CM

## 2022-05-04 DIAGNOSIS — Z47.89 SURGICAL AFTERCARE, MUSCULOSKELETAL SYSTEM: ICD-10-CM

## 2022-05-04 PROCEDURE — 97110 THERAPEUTIC EXERCISES: CPT | Mod: PN,CQ

## 2022-05-04 NOTE — PROGRESS NOTES
Workers' [employer] Physical Therapy Daily Treatment Note     Name: Albin Barfield  Clinic Number: [id number]    Therapy Diagnosis:   No diagnosis found.  Physician: Amelia Medrano PA-C    Visit Date: 5/4/2022    Physician Orders: PT Eval and Treat   Comments:   S/p Left ankle arthroscopy, debridement and microfracture  Left ankle brostrom with artelon reconstuction  Intraoperative use of fluoroscopy <1hr by Dr. Hager on 3-16-22.  PT: Start date: 4 weeks postop  Rx/protocol and restrictions:   --Gait training, edema control/desensitization modalities  --ADAT WBAT LLE, immobilization: boot; may initiate boot weaning 10 weeks postop   --AROM/AAROM and gentle strengthening/theraband work, but no PROM/manipulation until 10 weeks postop     Medical Diagnosis from Referral:   G57.32 (ICD-10-CM) - Peroneal neuritis, left   M93.272 (ICD-10-CM) - Osteochondritis dissecans of ankle, left   S93.402S (ICD-10-CM) - Inversion sprain of ankle, left, sequela      Evaluation Date: 4/18/2022  Authorization Period Expiration: 3/31/2023  Plan of Care Expiration: 5/27/2022  Progress Note Due: 5/27/2022  Visit # / Visits authorized: 6/12  FOTO: 1st visit   PTA: 1/5  (# of No Show Appts 0/Number of Cancelled Appts 0)    Time In: 1:55 pm   Time Out: 2:40 pm  Total Appointment Time (timed & untimed codes): 45 minutes (3TE)    Precautions: Standard and chronic pain, Sx precautions    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    He was compliant with home exercise program.  Response to previous treatment: no adverse effects  Functional change: none    Pain: 4 or 5/10  Location: dosum of foot as well  "anterior and lateral ankle and toes    OBJECTIVE     Objective Measures updated at progress report unless specified.     TREATMENT     Albin received the treatments listed below:      therapeutic exercises (bold exercises performed) to develop strength, endurance, ROM and flexibility for 45 minutes including:  Supine SLR flexion 2x10  Sidelying SLR 2x10  Prone SLR 2x10  Prone HS curls 2x15   Seated LAQ 2x10  AROM in limited DF/PF x20    Lateral weight shifting in standing with boot (as tolerated): 2x1'  Fwd/Bkw weight shifting in standing with boot as tolereated: x2'  L ankle place ups to 4" step with fwd weight shift: x2'  R LE lift offs to SLS tolerance with HHA PRN: x1'    manual therapy techniques  for 0 minutes, including:    PATIENT EDUCATION AND HOME EXERCISES      Home Exercises Provided and Patient Education Provided     Education provided: Pt was educated on all therapeutic exercises initiated during today's tx visit.     Written Home Exercises Provided: Patient instructed to cont prior HEP. Exercises were reviewed and Albin was able to demonstrate them prior to the end of the session.  Albin demonstrated good  understanding of the education provided. See EMR under Patient Instructions for exercises provided during therapy sessions    ASSESSMENT   Albin is a 41 y.o. male referred to outpatient Physical Therapy with a medical diagnosis of Peroneal neuritis, left; Osteochondritis dissecans of ankle, left; Inversion sprain of ankle, left, sequela.   Mr. 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 He then had skilled PT intervention with no resolution of s/s and subsequently had ATSx on 3/16/2022. Procedure: Left ankle arthroscopy, debridement and microfracture, Left ankle brostrom with artelon reconstuction, Intraoperative use of fluoroscopy <1hr.      Currently protocol as follows:   --Gait training, " edema control/desensitization modalities   --ADAT WBAT LLE, immobilization: boot; may initiate boot weaning 10 weeks postop   --AROM/AAROM and gentle strengthening/theraband work, but no PROM/manipulation until 10 weeks postop (5/25/2022)    Pt presented with L ankle pain upon arrival but provided good to fair tolerance to all therapeutic exercises. Pt reported the most difficulty performing weight shifting in standing secondary to dorsal L foot pain. Tx focused on LLE/hip strengthening coupled with weight acceptance activities to promote proper gait pattern. Will progress as tolerated.     Pt presents with reports of continued hypersensitivity in dorsum of foot with no new c/o regarding his foot; however, he did report significant c/o LBP in which case he almost did not come to therapy today due to elevated pain. Due to this adjusted visit activities marked today per activity log in order to reduce pain in order to allow him to continue with PT visit. Pt did note significant relief of LBP post session today.   Did report to session NWB L LE with scooter. Pt did bring his boot to session and utilized with WB activities focusing on improving WB status statically and dynamically. Did ask pt to also bring in B axillary crutches to his next visit. Planning to initiate B axillary crutch training as he is able to tolerate. Progress pt as tolerated.     Continued to report hypersensitivity to light touch and difficulty WB in boot. Continued with WB in boot with HHA to offload as needed. Planning to initiate B axillary crutch training as he is able to tolerate.    The patient's current job specific task deficits include the following:  L ankle pain, hypersensitivity to light touch, marked limited L ankle/foot AROM in all planes of motion, weakness and atrophy noted which has led to functional deficits including inability to ambulate at this time and therefore do any closed chain activity at this time.     Albin Mg  progressing well towards his goals.   Pt prognosis is Good.     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    Pt's spiritual, cultural and educational needs considered and pt agreeable to plan of care and goals.     Anticipated barriers to physical therapy: acuity of current injury, chronicity of current injury and current post-op status    Goals:     HEP:   - Pt will become compliant and independent with his initial HEP in 2 weeks to promote decreased pain and improved mobility and strength.   - Pt will become compliant and independent with an updated, progressed HEP in 12 weeks to promote decreased pain and improved mobility and strength as well as prep for discharge from further PT intervention.   Pain:   - Pt to report decrease in pain levels from 10/10 at worse to 8/10 at worse in 2 weeks.  - Pt to report decrease in pain levels from 10/10 at worse to 6/10 at worse in 4 weeks.  - Pt to report decrease in pain levels from 10/10 at worse to 4/10 at worse in 8 weeks.  Impairment level:   - Pt to increase DF ROM to neutral (unable to achieve neutral on eval) by week 2 in order to promote improved ROM and ability to WBAT in boot.   - Pt to increase DF ROM to 10* by week 12 in order to promote return to function with ability to ambulate, squat, and perform stair negotiation.   - MMT goals will be set when appropriately able to assess; not indicated at this time.   Function level:   - Pt will improve his FOTO score from 85% impairment to 50% improvement to indicate improvement in overall function in 12 weeks.   - Pt will be able to place approx 50% weight through the LLE with boot in place in 2 weeks.   - Pt will be able to place approx 75% weight through the LLE  with boot in place in 3 weeks.   - Pt will be able to ambulate with boot, WBAT in 4 weeks with AD as needed to promote energy conservation and community ambulation.   - Pt will be able to transition from boot to WBAT by week 10 post-op.      - RTW goals will be set at future visits when appropriately able to assess baseline measures.      Pt will return to work full duty, full time.    Plan   Plan of care Certification: 4/18/2022 to 7/8/2022.     Outpatient Physical Therapy 2 times weekly for 12 weeks to include the following interventions: Gait Training, Manual Therapy, Moist Heat/ Ice, Neuromuscular Re-ed, Patient Education, Self Care, Therapeutic Activities and Therapeutic Exercise.      Upon discharge from further skilled PT intervention it will determined if the need for a work conditioning program or Functional Capacity Evaluation is required to allow the injured worker to return to work with full potential achieved, continued improvement with body mechanics with advanced functional activities, and further prevention of future work-related injuries.       Frankie Reyes, PTA   5/4/2022

## 2022-05-06 ENCOUNTER — PATIENT MESSAGE (OUTPATIENT)
Dept: ORTHOPEDICS | Facility: CLINIC | Age: 42
End: 2022-05-06
Payer: OTHER MISCELLANEOUS

## 2022-05-09 ENCOUNTER — CLINICAL SUPPORT (OUTPATIENT)
Dept: REHABILITATION | Facility: HOSPITAL | Age: 42
End: 2022-05-09
Attending: PHYSICIAN ASSISTANT
Payer: OTHER MISCELLANEOUS

## 2022-05-09 ENCOUNTER — TELEPHONE (OUTPATIENT)
Dept: ORTHOPEDICS | Facility: CLINIC | Age: 42
End: 2022-05-09
Payer: OTHER MISCELLANEOUS

## 2022-05-09 DIAGNOSIS — M79.672 LEFT FOOT PAIN: Primary | ICD-10-CM

## 2022-05-09 DIAGNOSIS — Z47.89 SURGICAL AFTERCARE, MUSCULOSKELETAL SYSTEM: ICD-10-CM

## 2022-05-09 DIAGNOSIS — G89.18 POST-OP PAIN: ICD-10-CM

## 2022-05-09 PROCEDURE — 97110 THERAPEUTIC EXERCISES: CPT | Mod: PN

## 2022-05-09 RX ORDER — OXYCODONE HYDROCHLORIDE 5 MG/1
5 TABLET ORAL EVERY 6 HOURS PRN
Qty: 28 TABLET | Refills: 0 | Status: CANCELLED | OUTPATIENT
Start: 2022-05-09 | End: 2022-05-16

## 2022-05-09 RX ORDER — OXYCODONE HYDROCHLORIDE 5 MG/1
5 TABLET ORAL EVERY 8 HOURS PRN
Qty: 21 TABLET | Refills: 0 | Status: SHIPPED | OUTPATIENT
Start: 2022-05-09 | End: 2022-05-17 | Stop reason: SDUPTHER

## 2022-05-09 NOTE — TELEPHONE ENCOUNTER
reviewed and appropriate request.  Will continue to work on weaning.  Decrease to q8hrs #21.  Will continue to wean down to for therapy only until 3 months postop and then stop.

## 2022-05-09 NOTE — PROGRESS NOTES
"[employer] Physical Therapy Daily Treatment Note     Name: Albin Barfield  Clinic Number: [id number]    Therapy Diagnosis:   Encounter Diagnoses   Name Primary?    Left foot pain Yes    Surgical aftercare, musculoskeletal system      Physician: Amelia Medrano PA-C    Visit Date: 5/9/2022    Physician Orders: PT Eval and Treat   Comments:   S/p Left ankle arthroscopy, debridement and microfracture  Left ankle brostrom with artelon reconstuction  Intraoperative use of fluoroscopy <1hr by Dr. Hager on 3-16-22.  PT: Start date: 4 weeks postop  Rx/protocol and restrictions:   --Gait training, edema control/desensitization modalities  --ADAT WBAT LLE, immobilization: boot; may initiate boot weaning 10 weeks postop   --AROM/AAROM and gentle strengthening/theraband work, but no PROM/manipulation until 10 weeks postop     Medical Diagnosis from Referral:   G57.32 (ICD-10-CM) - Peroneal neuritis, left   M93.272 (ICD-10-CM) - Osteochondritis dissecans of ankle, left   S93.402S (ICD-10-CM) - Inversion sprain of ankle, left, sequela      Evaluation Date: 4/18/2022  Authorization Period Expiration: 3/31/2023  Plan of Care Expiration: 5/27/2022  Progress Note Due: 5/27/2022  Visit # / Visits authorized: 7/12  FOTO: next at 10th visit  PTA: 0/5  (# of No Show Appts 0/Number of Cancelled Appts 0)    Time In: 1:45pm   Time Out: 2:45pm  Total Appointment Time (timed & untimed codes): 60 minutes (4 TE)    Precautions: Standard and chronic pain, Sx precautions    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 Reports that even bending forward is bothering him lately while in his recliner - reports this significantly increases his tingling, " "nerve pain in his big toe specifically. Also reports that he has been trying his best to keep up with his exercises at home and putting more weight on his foot while in his boot at home, but still is having significant trouble due to pain; trying to push through, but pain is severe.   He was compliant with home exercise program.  Response to previous treatment: no adverse effects  Functional change: Able to put more weight through the L LE than when initiated PT    Pain: 7-9/10  Location: dosum of foot as well anterior and lateral ankle and toes    OBJECTIVE     Objective Measures updated at progress report unless specified.   "when bending forward increases foot s/s"  SLR (+) L, (+) Slump test L from both seated and supine positioning   (+) PIR mm tightness with stretching activity     TREATMENT     Albin received the treatments listed below:      therapeutic exercises (bold exercises performed) to develop strength, endurance, ROM and flexibility for 60 minutes including activities below and screening above:  Seated sciatic nn glides/LAQ to s/s, not through s/s 2x10  Prone press up 2x10  Prone on elbows x3'  Supine PIR stretch 3x30"  Supine SLR flexion 2x10 - removed today due to c/o increased nn tension - not to complete at f/u visits  Sidelying SLR 2x10    Prone SLR 2x10  Prone HS curls 2x15     AROM in limited DF/PF x20    Standing L LE Hip abduction with boot 2x15  Lateral weight shifting in standing with boot (as tolerated): 2x1'  Fwd/Bkw weight shifting in standing with boot as tolereated: x2'  L ankle place ups to 4" step with fwd weight shift: x2'  R LE lift offs to SLS tolerance with HHA PRN: x1'    manual therapy techniques  for 0 minutes, including: to begin at 10 weeks post-op on 5/25/2022.     PATIENT EDUCATION AND HOME EXERCISES      Home Exercises Provided and Patient Education Provided     Education provided: Pt was educated on all therapeutic exercises initiated during today's tx visit. " "    Written Home Exercises Provided: Patient instructed to cont prior HEP. Exercises were reviewed and Albin was able to demonstrate them prior to the end of the session.  Albin demonstrated good  understanding of the education provided. See EMR under Patient Instructions for exercises provided during therapy sessions    ASSESSMENT   Albin is a 41 y.o. male referred to outpatient Physical Therapy with a medical diagnosis of Peroneal neuritis, left; Osteochondritis dissecans of ankle, left; Inversion sprain of ankle, left, sequela.   Mr. 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 He then had skilled PT intervention with no resolution of s/s and subsequently had ATSx on 3/16/2022. Procedure: Left ankle arthroscopy, debridement and microfracture, Left ankle brostrom with artelon reconstuction, Intraoperative use of fluoroscopy <1hr.      Currently protocol as follows:   --Gait training, edema control/desensitization modalities   --ADAT WBAT LLE, immobilization: boot; may initiate boot weaning 10 weeks postop   --AROM/AAROM and gentle strengthening/theraband work, but no PROM/manipulation until 10 weeks postop (5/25/2022)    Pt presented with ongoing severe L ankle/foot "nerve" pain upon arrival with ongoing significant hypersensitivity in dorsum of foot (to light touch, air blowing on the area, etc). Discussed ongoing desensitization activities for him to continue to perform at home today.   Also pt reported increase in L foot/ankle pain, specifically about the great toe when bending. Due to this c/o and recent c/o LBP at past visits, screened the pt's lumbar spine. He was with increased s/s with lumbar flexion, (+) slump test, and (+) SLR testing. Due to this, adjustments were made to the pt's HEP including: prone press ups, prone on elbows, sciatic nn glides from seated position, and PIR stretching from full " supine position.   Otherwise, pt continues to report to session NWB L LE with scooter. Pt did bring his boot to session and utilized with WB activities focusing on improving WB status statically and dynamically. Did ask pt to also bring in B axillary crutches to his next visit and reports he will. Let pt know he can call us when he magana and we can walk down to bring them up to the clinic while he utilizes his scooter, for safety. Planning to initiate B axillary crutch training as he is able to tolerate. Progress pt as tolerated.     The patient's current job specific task deficits include the following:  L ankle pain, hypersensitivity to light touch, marked limited L ankle/foot AROM in all planes of motion, weakness and atrophy noted which has led to functional deficits including inability to ambulate at this time and therefore do any closed chain activity at this time.     Albin Is progressing well towards his goals.   Pt prognosis is Good.     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    Pt's spiritual, cultural and educational needs considered and pt agreeable to plan of care and goals.     Anticipated barriers to physical therapy: acuity of current injury, chronicity of current injury and current post-op status    Goals:     HEP:   - Pt will become compliant and independent with his initial HEP in 2 weeks to promote decreased pain and improved mobility and strength. MET  - Pt will become compliant and independent with an updated, progressed HEP in 12 weeks to promote decreased pain and improved mobility and strength as well as prep for discharge from further PT intervention.   Pain:   - Pt to report decrease in pain levels from 10/10 at worse to 8/10 at  worse in 2 weeks.  - Pt to report decrease in pain levels from 10/10 at worse to 6/10 at worse in 4 weeks.  - Pt to report decrease in pain levels from 10/10 at worse to 4/10 at worse in 8 weeks.  Impairment level:   - Pt to increase DF ROM to neutral (unable to achieve neutral on eval) by week 2 in order to promote improved ROM and ability to WBAT in boot.   - Pt to increase DF ROM to 10* by week 12 in order to promote return to function with ability to ambulate, squat, and perform stair negotiation.   - MMT goals will be set when appropriately able to assess; not indicated at this time.   Function level:   - Pt will improve his FOTO score from 85% impairment to 50% improvement to indicate improvement in overall function in 12 weeks.   - Pt will be able to place approx 50% weight through the LLE with boot in place in 2 weeks.   - Pt will be able to place approx 75% weight through the LLE with boot in place in 3 weeks.   - Pt will be able to ambulate with boot, WBAT in 4 weeks with AD as needed to promote energy conservation and community ambulation.   - Pt will be able to transition from boot to WBAT by week 10 post-op.      - RTW goals will be set at future visits when appropriately able to assess baseline measures.      Pt will return to work full duty, full time.    Plan   Plan of care Certification: 4/18/2022 to 7/8/2022.     Outpatient Physical Therapy 2 times weekly for 12 weeks to include the following interventions: Gait Training, Manual Therapy, Moist Heat/ Ice, Neuromuscular Re-ed, Patient Education, Self Care, Therapeutic Activities and Therapeutic Exercise.      Upon discharge from further skilled PT intervention it will determined if the need for a work conditioning program or Functional Capacity Evaluation is required to allow the injured worker to return to work with full potential achieved, continued improvement with body mechanics with advanced functional activities, and further prevention of  future work-related injuries.       Maximino Blake, PT   5/11/2022

## 2022-05-10 DIAGNOSIS — G57.32 PERONEAL NEURITIS, LEFT: ICD-10-CM

## 2022-05-10 RX ORDER — CELECOXIB 200 MG/1
200 CAPSULE ORAL 2 TIMES DAILY
Qty: 60 CAPSULE | Refills: 0 | Status: SHIPPED | OUTPATIENT
Start: 2022-05-10 | End: 2022-06-09

## 2022-05-10 NOTE — TELEPHONE ENCOUNTER
Spoke to pt and informed him rx was sent yesterday----- Message from [place] Chevalier sent at 5/10/2022  3:10 PM CDT -----  Regarding: pt advice  Contact: pt @ [phone]  Pt calling to speak with Chuck in Dr. Hager's office regarding pain medication denial (oxyCODONE (ROXICODONE) 5 MG immediate release tablet).

## 2022-05-11 ENCOUNTER — CLINICAL SUPPORT (OUTPATIENT)
Dept: REHABILITATION | Facility: HOSPITAL | Age: 42
End: 2022-05-11
Attending: PHYSICIAN ASSISTANT
Payer: OTHER MISCELLANEOUS

## 2022-05-11 DIAGNOSIS — M79.672 LEFT FOOT PAIN: Primary | ICD-10-CM

## 2022-05-11 DIAGNOSIS — Z47.89 SURGICAL AFTERCARE, MUSCULOSKELETAL SYSTEM: ICD-10-CM

## 2022-05-11 PROCEDURE — 97110 THERAPEUTIC EXERCISES: CPT | Mod: PN

## 2022-05-11 NOTE — PROGRESS NOTES
"[employer] Physical Therapy Daily Treatment Note     Name: Albin Barfield  Clinic Number: [id number]    Therapy Diagnosis:   Encounter Diagnoses   Name Primary?    Left foot pain Yes    Surgical aftercare, musculoskeletal system      Physician: Amelia Medrano PA-C    Visit Date: 5/11/2022    Physician Orders: PT Eval and Treat   Comments:   S/p Left ankle arthroscopy, debridement and microfracture  Left ankle brostrom with artelon reconstuction  Intraoperative use of fluoroscopy <1hr by Dr. Hager on 3-16-22.  PT: Start date: 4 weeks postop  Rx/protocol and restrictions:   --Gait training, edema control/desensitization modalities  --ADAT WBAT LLE, immobilization: boot; may initiate boot weaning 10 weeks postop   --AROM/AAROM and gentle strengthening/theraband work, but no PROM/manipulation until 10 weeks postop     Medical Diagnosis from Referral:   G57.32 (ICD-10-CM) - Peroneal neuritis, left   M93.272 (ICD-10-CM) - Osteochondritis dissecans of ankle, left   S93.402S (ICD-10-CM) - Inversion sprain of ankle, left, sequela      Evaluation Date: 4/18/2022  Authorization Period Expiration: 3/31/2023  Plan of Care Expiration: 5/27/2022  Progress Note Due: 5/27/2022  Visit # / Visits authorized: 8/12  FOTO: next at 10th visit  PTA: 0/5  (# of No Show Appts 0/Number of Cancelled Appts 0)    Time In: 1:30pm   Time Out: 215pm  Total Appointment Time (timed & untimed codes): 45 minutes (3TE)    Precautions: Standard and chronic pain, Sx precautions    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 Reports 10/10 last night and having a really hard time with the pain lately. Reports that he feels like he is having specific "nerve" pain, feels like " "1,000 bees/wasps stinging him in his foot. Reports worse when bending forward (I.e sitting and reaching down). Reports still keeping up with all exercises he is supposed to and understands he should have some pain while transitioning to weight bearing, but his pain is unbearable at times. Has been doing all ex's except since last visit did not complete his standing in boot activities due to being in so much pain. Waiting for approval for pain management as well.     He was compliant with home exercise program.  Response to previous treatment: no adverse effects  Functional change: Able to put more weight through the L LE than when initiated PT    Pain: 8/10 (Range 6-8, but last night 10/10)  Location: dosum of foot as well anterior and lateral ankle and toes    OBJECTIVE     Objective Measures updated at progress report unless specified.   "when bending forward increases foot s/s"  Lumbar flexion in both standing and sitting ([facility] vs [facility]) increases L foot s/s!  SLR (+) L, (+) Slump test L from both seated and supine positioning   (+) PIR mm tightness with stretching activity     TREATMENT     Albin received the treatments listed below:      therapeutic exercises (bold exercises performed) to develop strength, endurance, ROM and flexibility for 45 minutes including activities below and screening above:  Seated sciatic nn glides/LAQ to s/s, not through s/s 2x10  Prone press up 2x10  Prone on elbows x3'  Supine PIR stretch 3x30" L LE contralateral LE in full supine  Supine SLR flexion 2x10 - removed today due to c/o increased nn tension - not to complete at f/u visits  Sidelying SLR 2x10    Prone SLR 2x10  Prone HS curls 2x15     AROM in limited DF/PF x20    Standing lumbar repeated extension vs physioball in corner x10  Standing L LE Hip abduction with boot 2x15  Standing L LE hip abduction with boot 2x15  Lateral weight shifting in standing with boot (as tolerated): 2x1'  Fwd/Bkw weight shifting in standing with " "boot as tolereated: x2'  L ankle place ups to 4" step with fwd weight shift: x2'  R LE lift offs to SLS tolerance with HHA PRN: x1'    manual therapy techniques  for 0 minutes, including: to begin at 10 weeks post-op on 5/25/2022.     PATIENT EDUCATION AND HOME EXERCISES      Home Exercises Provided and Patient Education Provided     Education provided: Pt was educated on all therapeutic exercises initiated during today's tx visit.     Written Home Exercises Provided: Patient instructed to cont prior HEP. Exercises were reviewed and Albin was able to demonstrate them prior to the end of the session.  Albin demonstrated good  understanding of the education provided. See EMR under Patient Instructions for exercises provided during therapy sessions    ASSESSMENT   Albin is a 41 y.o. male referred to outpatient Physical Therapy with a medical diagnosis of Peroneal neuritis, left; Osteochondritis dissecans of ankle, left; Inversion sprain of ankle, left, sequela.   Mr. 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 He then had skilled PT intervention with no resolution of s/s and subsequently had ATSx on 3/16/2022. Procedure: Left ankle arthroscopy, debridement and microfracture, Left ankle brostrom with artelon reconstuction, Intraoperative use of fluoroscopy <1hr.      Currently protocol as follows:   --Gait training, edema control/desensitization modalities   --ADAT WBAT LLE, immobilization: boot; may initiate boot weaning 10 weeks postop   --AROM/AAROM and gentle strengthening/theraband work, but no PROM/manipulation until 10 weeks postop (5/25/2022)    Pt presented with ongoing severe L ankle/foot "nerve" pain upon arrival with ongoing significant hypersensitivity in dorsum of foot (to light touch, air blowing on the area, etc). Discussed ongoing desensitization activities for him to continue to perform at home " today. Pt now able to tolerate light tough to the dorsum of digits 1-5, though most pronounced at the great toe.   Important to note pt reported increase in L foot/ankle pain, specifically about the great toe, when bending. Due to this c/o and recent c/o LBP at past visits, screened the pt's lumbar spine in both open and closed chain. He was with increased s/s with lumbar flexion both open and closed chain, (+) slump test, and (+) SLR testing. Due to this, adjustments were made to the pt's HEP including: prone press ups, prone on elbows, sciatic nn glides from seated position, and PIR stretching from full supine position.   Otherwise, pt continues to report to session NWB L LE with scooter. Pt did bring crutches to session today and was able to ambulate with B axillary crutches x approx 25 feet, though with ongoing marked discomfort with attempts to full WB through the L LE. Utilized with WB activities focusing on improving WB status statically and dynamically. Did ask pt to also bring in B axillary crutches to his next visit and reports he will. Let pt know he can call us when he magana and we can walk down to bring them up to the clinic while he utilizes his scooter, for safety. Planning to initiate B axillary crutch training as he is able to tolerate. Progress pt as tolerated.     The patient's current job specific task deficits include the following:  L ankle pain, hypersensitivity to light touch, marked limited L ankle/foot AROM in all planes of motion, weakness and atrophy noted which has led to functional deficits including inability to ambulate at this time and therefore do any closed chain activity at this time.     Albin Is progressing well towards his goals.   Pt prognosis is Good.     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    Pt's spiritual, cultural and educational needs considered and pt agreeable to plan of care and goals.     Anticipated barriers to physical therapy: acuity of current injury, chronicity of current injury and current post-op status    Goals:     HEP:   - Pt will become compliant and independent with his initial HEP in 2 weeks to promote decreased pain and improved mobility and strength. MET  - Pt will become compliant and independent with an updated, progressed HEP in 12 weeks to promote decreased pain and improved mobility and strength as well as prep for discharge from further PT intervention.   Pain:   - Pt to report decrease in pain levels from 10/10 at worse to 8/10 at worse in 2 weeks.  - Pt to report decrease in pain levels from 10/10 at worse to 6/10 at worse in 4 weeks.  - Pt to report decrease in pain levels from 10/10 at worse to 4/10 at worse in 8 weeks.  Impairment level:   - Pt to increase DF ROM to neutral (unable to achieve neutral on eval) by week 2 in order to promote improved ROM and ability to WBAT in boot.   - Pt to increase DF ROM to 10* by week 12 in order to promote return to function with ability to ambulate, squat, and perform stair negotiation.   - MMT goals will be set when appropriately able to assess; not indicated at this time.   Function level:   - Pt will improve his FOTO score from 85% impairment to 50% improvement to indicate improvement in overall function in 12 weeks.   - Pt will be able to place approx 50% weight through the LLE with boot in place in 2 weeks.   - Pt will be able to place approx 75% weight through the LLE with boot in place in 3 weeks.   - Pt will be able to ambulate with boot, WBAT in 4 weeks with AD as needed to promote energy conservation and community ambulation.   - Pt will be able to transition from boot to WBAT by week 10 post-op.      - RTW goals will be set at  future visits when appropriately able to assess baseline measures.      Pt will return to work full duty, full time.    Plan   Plan of care Certification: 4/18/2022 to 7/8/2022.     Outpatient Physical Therapy 2 times weekly for 12 weeks to include the following interventions: Gait Training, Manual Therapy, Moist Heat/ Ice, Neuromuscular Re-ed, Patient Education, Self Care, Therapeutic Activities and Therapeutic Exercise.      Upon discharge from further skilled PT intervention it will determined if the need for a work conditioning program or Functional Capacity Evaluation is required to allow the injured worker to return to work with full potential achieved, continued improvement with body mechanics with advanced functional activities, and further prevention of future work-related injuries.       Maximino Blake, PT   5/12/2022

## 2022-05-13 ENCOUNTER — TELEPHONE (OUTPATIENT)
Dept: ORTHOPEDICS | Facility: CLINIC | Age: 42
End: 2022-05-13
Payer: OTHER MISCELLANEOUS

## 2022-05-13 ENCOUNTER — CLINICAL SUPPORT (OUTPATIENT)
Dept: REHABILITATION | Facility: HOSPITAL | Age: 42
End: 2022-05-13
Attending: PHYSICIAN ASSISTANT
Payer: OTHER MISCELLANEOUS

## 2022-05-13 DIAGNOSIS — M54.16 LUMBAR RADICULOPATHY, CHRONIC: ICD-10-CM

## 2022-05-13 DIAGNOSIS — M79.672 LEFT FOOT PAIN: Primary | ICD-10-CM

## 2022-05-13 DIAGNOSIS — Z47.89 SURGICAL AFTERCARE, MUSCULOSKELETAL SYSTEM: ICD-10-CM

## 2022-05-13 PROCEDURE — 97110 THERAPEUTIC EXERCISES: CPT | Mod: PN,CQ

## 2022-05-13 NOTE — PROGRESS NOTES
[employer] Physical Therapy Daily Treatment Note     Name: Albin Barfield  Clinic Number: [id number]    Therapy Diagnosis:   No diagnosis found.  Physician: Amelia Medrano PA-C    Visit Date: 5/13/2022    Physician Orders: PT Eval and Treat   Comments:   S/p Left ankle arthroscopy, debridement and microfracture  Left ankle brostrom with artelon reconstuction  Intraoperative use of fluoroscopy <1hr by Dr. Hager on 3-16-22.  PT: Start date: 4 weeks postop  Rx/protocol and restrictions:   --Gait training, edema control/desensitization modalities  --ADAT WBAT LLE, immobilization: boot; may initiate boot weaning 10 weeks postop   --AROM/AAROM and gentle strengthening/theraband work, but no PROM/manipulation until 10 weeks postop     Medical Diagnosis from Referral:   G57.32 (ICD-10-CM) - Peroneal neuritis, left   M93.272 (ICD-10-CM) - Osteochondritis dissecans of ankle, left   S93.402S (ICD-10-CM) - Inversion sprain of ankle, left, sequela      Evaluation Date: 4/18/2022  Authorization Period Expiration: 3/31/2023  Plan of Care Expiration: 5/27/2022  Progress Note Due: 5/27/2022  Visit # / Visits authorized: 9/12  FOTO: next at 10th visit  PTA: 1/5  (# of No Show Appts 0/Number of Cancelled Appts 0)    Time In: 1:45 pm   Time Out: 2:35 pm  Total Appointment Time (timed & untimed codes): 50 minutes (3TE)    Precautions: Standard and chronic pain, Sx precautions    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   He was compliant with home exercise program.  Response to previous treatment: no adverse effects  Functional change: Able to put more weight through the L LE than  "when initiated PT    Pain: 7/10 (Range 6-8, but last night 10/10)  Location: dosum of foot as well anterior and lateral ankle and toes    OBJECTIVE     Objective Measures updated at progress report unless specified.   "when bending forward increases foot s/s"  Lumbar flexion in both standing and sitting ([facility]C vs CKC) increases L foot s/s!  SLR (+) L, (+) Slump test L from both seated and supine positioning   (+) PIR mm tightness with stretching activity     TREATMENT     Albin received the treatments listed below:      therapeutic exercises (bold exercises performed) to develop strength, endurance, ROM and flexibility for 50 minutes including activities below and screening above:    Seated sciatic nn glides/LAQ to s/s, not through s/s 2x10   Prone press up 2x10  Prone on elbows x3'  Supine PIR stretch 3x30" L LE contralateral LE in full supine  Supine SLR flexion 2x10 - removed today due to c/o increased nn tension - not to complete at f/u visits  Sidelying SLR 2x10    Prone SLR 2x10  Prone HS curls 2x15     AROM in limited DF/PF x20    Standing lumbar repeated extension vs physioball in corner x15  Standing L LE Hip abduction with boot 2x15  Standing L LE hip abduction with boot 2x15  Lateral weight shifting in standing with boot (as tolerated): 2x1'  Fwd/Bkw weight shifting in standing with boot as tolereated: x2'  L ankle place ups to 4" step with fwd weight shift: x2'  R LE lift offs to SLS tolerance with HHA PRN: x1'    manual therapy techniques  for 0 minutes, including: to begin at 10 weeks post-op on 5/25/2022.     PATIENT EDUCATION AND HOME EXERCISES      Home Exercises Provided and Patient Education Provided     Education provided: Pt was educated on all therapeutic exercises initiated during today's tx visit.     Written Home Exercises Provided: Patient instructed to cont prior HEP. Exercises were reviewed and Albin was able to demonstrate them prior to the end of the session.  Albin demonstrated " "good  understanding of the education provided. See EMR under Patient Instructions for exercises provided during therapy sessions    ASSESSMENT   Albin is a 41 y.o. male referred to outpatient Physical Therapy with a medical diagnosis of Peroneal neuritis, left; Osteochondritis dissecans of ankle, left; Inversion sprain of ankle, left, sequela.   Mr. 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 He then had skilled PT intervention with no resolution of s/s and subsequently had ATSx on 3/16/2022. Procedure: Left ankle arthroscopy, debridement and microfracture, Left ankle brostrom with artelon reconstuction, Intraoperative use of fluoroscopy <1hr.      Currently protocol as follows:   --Gait training, edema control/desensitization modalities   --ADAT WBAT LLE, immobilization: boot; may initiate boot weaning 10 weeks postop   --AROM/AAROM and gentle strengthening/theraband work, but no PROM/manipulation until 10 weeks postop (5/25/2022)    Pt continues to report to session [facility] L LE with scooter. Pt presented with ongoing severe L ankle/foot "nerve" pain upon arrival with ongoing significant hypersensitivity in dorsum of foot (to light touch, air blowing on the area, etc). Pt continues to report increase in L foot/ankle pain, specifically about the great toe, when bending. Utilized with WB activities focusing on improving WB status statically and dynamically. Planning to initiate B axillary crutch training as he is able to tolerate. Progress pt as tolerated.     The patient's current job specific task deficits include the following:  L ankle pain, hypersensitivity to light touch, marked limited L ankle/foot AROM in all planes of motion, weakness and atrophy noted which has led to functional deficits including inability to ambulate at this time and therefore do any closed chain activity at this time.     Albin Is " progressing well towards his goals.   Pt prognosis is Good.     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    Pt's spiritual, cultural and educational needs considered and pt agreeable to plan of care and goals.     Anticipated barriers to physical therapy: acuity of current injury, chronicity of current injury and current post-op status    Goals:     HEP:   - Pt will become compliant and independent with his initial HEP in 2 weeks to promote decreased pain and improved mobility and strength. MET  - Pt will become compliant and independent with an updated, progressed HEP in 12 weeks to promote decreased pain and improved mobility and strength as well as prep for discharge from further PT intervention.   Pain:   - Pt to report decrease in pain levels from 10/10 at worse to 8/10 at worse in 2 weeks.  - Pt to report decrease in pain levels from 10/10 at worse to 6/10 at worse in 4 weeks.  - Pt to report decrease in pain levels from 10/10 at worse to 4/10 at worse in 8 weeks.  Impairment level:   - Pt to increase DF ROM to neutral (unable to achieve neutral on eval) by week 2 in order to promote improved ROM and ability to WBAT in boot.   - Pt to increase DF ROM to 10* by week 12 in order to promote return to function with ability to ambulate, squat, and perform stair negotiation.   - MMT goals will be set when appropriately able to assess; not indicated at this time.   Function level:   - Pt will improve his FOTO score from 85% impairment to 50% improvement to indicate improvement in overall function in 12 weeks.   - Pt will be able to place approx 50% weight through the LLE with boot in place in 2 weeks.   - Pt will be able to place approx 75% weight through the  LLE with boot in place in 3 weeks.   - Pt will be able to ambulate with boot, WBAT in 4 weeks with AD as needed to promote energy conservation and community ambulation.   - Pt will be able to transition from boot to WBAT by week 10 post-op.      - RTW goals will be set at future visits when appropriately able to assess baseline measures.      Pt will return to work full duty, full time.    Plan   Plan of care Certification: 4/18/2022 to 7/8/2022.     Outpatient Physical Therapy 2 times weekly for 12 weeks to include the following interventions: Gait Training, Manual Therapy, Moist Heat/ Ice, Neuromuscular Re-ed, Patient Education, Self Care, Therapeutic Activities and Therapeutic Exercise.      Upon discharge from further skilled PT intervention it will determined if the need for a work conditioning program or Functional Capacity Evaluation is required to allow the injured worker to return to work with full potential achieved, continued improvement with body mechanics with advanced functional activities, and further prevention of future work-related injuries.       Frankie Reyes, PTA   5/13/2022

## 2022-05-13 NOTE — TELEPHONE ENCOUNTER
----- Message from Maximino Blake, PT sent at 5/11/2022  4:55 PM CDT -----  Regarding: Patient Update  Good afternoon Dr. Hager,     I wanted to flag you on Albin's case. He is with ongoing severe pain and marked hypersensitivity to the dorsum of the foot, primarily about the great toe. He is certainly limited at this time due to severity and irritability of his s/s. Otherwise, he is beginning to put more weight through the L LE with his boot in pace within the clinic and was even able to utilize B axillary crutches today > his scooter.     My primary concern is his lower back. He did c/o marked LBP one visit about a week ago, but otherwise no c/o specific LBP. However, I am able to reproduce, well really increase, his foot tingling/pain with lumbar screening. Any forward flexion of the lumbar spine cases marked increase in his s/s (both open and closed chain) and he has both (+) slump and (+) SLR testing. I do see he has a referral pending with pain management, which I think could be helpful, but I did want to flag you on what we are seeing in the clinic and parts of his case which are also limiting his progress.      He really does put forth as much effort as he can and has been compliant to everything we ask of him and his PT visits.     Thanks for your time and please let me know if there is any additional information I could provide you on my end or if there is anything else we can do to help,     Maximino

## 2022-05-13 NOTE — TELEPHONE ENCOUNTER
Note received from PT regarding patients ongoing progress in PT.  Struggles with ongoing LBP with s/s consistent with disc pathology.  Given this is limiting his progress and has been ongoing for 4+ weeks without relief from PT and medications, will order MRI.    Pain management referral also pending.

## 2022-05-16 ENCOUNTER — CLINICAL SUPPORT (OUTPATIENT)
Dept: REHABILITATION | Facility: HOSPITAL | Age: 42
End: 2022-05-16
Payer: OTHER MISCELLANEOUS

## 2022-05-16 DIAGNOSIS — Z47.89 SURGICAL AFTERCARE, MUSCULOSKELETAL SYSTEM: ICD-10-CM

## 2022-05-16 DIAGNOSIS — M79.672 LEFT FOOT PAIN: Primary | ICD-10-CM

## 2022-05-16 PROCEDURE — 97110 THERAPEUTIC EXERCISES: CPT | Mod: PN

## 2022-05-16 NOTE — PROGRESS NOTES
[employer] Physical Therapy Daily Treatment Note     Name: Albin Barfield  Clinic Number: [id number]    Therapy Diagnosis:   Encounter Diagnoses   Name Primary?    Left foot pain Yes    Surgical aftercare, musculoskeletal system      Physician: Amelia Medrano PA-C    Visit Date: 5/16/2022    Physician Orders: PT Eval and Treat   Comments:   S/p Left ankle arthroscopy, debridement and microfracture  Left ankle brostrom with artelon reconstuction  Intraoperative use of fluoroscopy <1hr by Dr. Hager on 3-16-22.  PT: Start date: 4 weeks postop  Rx/protocol and restrictions:   --Gait training, edema control/desensitization modalities  --ADAT WBAT LLE, immobilization: boot; may initiate boot weaning 10 weeks postop   --AROM/AAROM and gentle strengthening/theraband work, but no PROM/manipulation until 10 weeks postop     Medical Diagnosis from Referral:   G57.32 (ICD-10-CM) - Peroneal neuritis, left   M93.272 (ICD-10-CM) - Osteochondritis dissecans of ankle, left   S93.402S (ICD-10-CM) - Inversion sprain of ankle, left, sequela      Evaluation Date: 4/18/2022  Authorization Period Expiration: 3/31/2023  Plan of Care Expiration: 5/27/2022  Progress Note Due: 5/27/2022  Visit # / Visits authorized: 10/12  FOTO: next at 10th visit  PTA: 1/5  (# of No Show Appts 0/Number of Cancelled Appts 0)    Time In: 1:45pm   Time Out: 2:45pm  Total Appointment Time (timed & untimed codes): 60 minutes (4TE)    Precautions: Standard and chronic pain, Sx precautions    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 Feels ongoing strong radicular symptoms in L foot when bending down. Has been  "trying to put a little more weight through the foot with his boot on but still having trouble, increased pain with. Also reports (and showed therapist) picture of his foot after he is up/standing for a while. Marked swelling noted with this picture!!   He was compliant with home exercise program.  Response to previous treatment: no adverse effects  Functional change: Able to put more weight through the L LE than when initiated PT    Pain: 5-6/10 (Range 6-8 and up to 10)  Location: dosum of foot as well anterior and lateral ankle and toes    OBJECTIVE     Objective Measures updated at progress report unless specified.   "when bending forward increases foot s/s"  Lumbar flexion in both standing and sitting ([facility]C vs [facility]C) increases L foot s/s!  SLR (+) L, (+) Slump test L from both seated and supine positioning   (+) PIR mm tightness with stretching activity     TREATMENT     Albin received the treatments listed below:      therapeutic exercises (bold exercises performed) to develop strength, endurance, ROM and flexibility for 60 minutes including activities below and screening above:    Seated sciatic nn glides/LAQ to s/s, not through s/s 2x10   Prone press up 2x10  Prone on elbows x3'  Supine PIR stretch 5x30" L LE contralateral LE in full supine  Supine SLR flexion - not to complete at f/u visits due to increased nn tension  Sidelying SLR 3x10, 3# proximal away from incision site  Supine bridging (to tolerance L LE roughly 25%) 2x10     Prone SLR 2x10  Prone HS curls 2x15     AROM in limited DF/PF x20    Standing lumbar repeated extension vs physioball in corner x15  Standing L LE Hip abduction with boot 2x15  Standing L LE hip extension with boot 2x15  Lateral weight shifting in standing with boot (as tolerated): 2x1'  Fwd/Bkw weight shifting in standing with boot as tolereated: x2'  L ankle place ups to 4" step with fwd weight shift: x2'  R LE lift offs to SLS tolerance with HHA PRN: x1'  R LE place ups to 6" " step x10     manual therapy techniques  for 0 minutes, including: to begin at 10 weeks post-op on 5/25/2022.     PATIENT EDUCATION AND HOME EXERCISES      Home Exercises Provided and Patient Education Provided     Education provided: Pt was educated on all therapeutic exercises initiated during today's tx visit.     Written Home Exercises Provided: Patient instructed to cont prior HEP. Exercises were reviewed and Albin was able to demonstrate them prior to the end of the session.  Albin demonstrated good  understanding of the education provided. See EMR under Patient Instructions for exercises provided during therapy sessions    ASSESSMENT   Albin is a 41 y.o. male referred to outpatient Physical Therapy with a medical diagnosis of Peroneal neuritis, left; Osteochondritis dissecans of ankle, left; Inversion sprain of ankle, left, sequela.   Mr. 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 He then had skilled PT intervention with no resolution of s/s and subsequently had ATSx on 3/16/2022. Procedure: Left ankle arthroscopy, debridement and microfracture, Left ankle brostrom with artelon reconstuction, Intraoperative use of fluoroscopy <1hr.      Currently protocol as follows:   --Gait training, edema control/desensitization modalities   --ADAT WBAT LLE, immobilization: boot; may initiate boot weaning 10 weeks postop   --AROM/AAROM and gentle strengthening/theraband work, but no PROM/manipulation until 10 weeks postop (5/25/2022)    Pt continues to report to session KAYLIE LYMAN with scooter. Pt does present with boot which was utilized during WB activities. Encouraged pt to bring axillary crutches to next session to work on ambulation. Pt noted he did have his crutches in his truck today and asked pt to call us next session when he arrives which will allow us to walk down to assist.   He continues to present with  "ongoing severe L ankle/foot "nerve" pain upon arrival with ongoing significant hypersensitivity in dorsum of foot (to light touch, air blowing on the area, etc). Pt continues to report increase in L foot/ankle pain, specifically about the great toe, when bending/forward flexing the lumbar spine (I.e. when bending down to grab his boot to don). Due to this continued intermittent activities to address this to allow him to tolerate session better. He does have a new referral placed by his provider for lumbar spine imaging - awaiting approval at this time.   Despite ongoing moderate to severe s/s, pt did tolerate a mild increase in both open and closed chain treatment activities as indicated above. Utilized with WB activities focusing on improving WB status statically and dynamically. Planning to initiate B axillary crutch training as he is able to tolerate. Progress pt as tolerated.     Important to note the pt has 2 additional approved PT sessions. Will reach out to referring MD to request a new referral to be placed.     The patient's current job specific task deficits include the following:  L ankle pain, hypersensitivity to light touch, marked limited L ankle/foot AROM in all planes of motion, weakness and atrophy noted which has led to functional deficits including inability to ambulate at this time and therefore do any closed chain activity at this time.     Albin Is progressing well towards his goals.   Pt prognosis is Good.     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    Pt's spiritual, cultural and educational needs considered and pt agreeable to plan of care and goals.     Anticipated barriers to physical therapy: acuity of current injury, chronicity " of current injury and current post-op status    Goals:     HEP:   - Pt will become compliant and independent with his initial HEP in 2 weeks to promote decreased pain and improved mobility and strength. MET  - Pt will become compliant and independent with an updated, progressed HEP in 12 weeks to promote decreased pain and improved mobility and strength as well as prep for discharge from further PT intervention.   Pain:   - Pt to report decrease in pain levels from 10/10 at worse to 8/10 at worse in 2 weeks.  - Pt to report decrease in pain levels from 10/10 at worse to 6/10 at worse in 4 weeks.  - Pt to report decrease in pain levels from 10/10 at worse to 4/10 at worse in 8 weeks.  Impairment level:   - Pt to increase DF ROM to neutral (unable to achieve neutral on eval) by week 2 in order to promote improved ROM and ability to WBAT in boot.   - Pt to increase DF ROM to 10* by week 12 in order to promote return to function with ability to ambulate, squat, and perform stair negotiation.   - MMT goals will be set when appropriately able to assess; not indicated at this time.   Function level:   - Pt will improve his FOTO score from 85% impairment to 50% improvement to indicate improvement in overall function in 12 weeks.   - Pt will be able to place approx 50% weight through the LLE with boot in place in 2 weeks.   - Pt will be able to place approx 75% weight through the LLE with boot in place in 3 weeks.   - Pt will be able to ambulate with boot, WBAT in 4 weeks with AD as needed to promote energy conservation and community ambulation.   - Pt will be able to transition from boot to WBAT by week 10 post-op.      - RTW goals will be set at future visits when appropriately able to assess baseline measures.      Pt will return to work full duty, full time.    Plan   Plan of care Certification: 4/18/2022 to 7/8/2022.     Outpatient Physical Therapy 2 times weekly for 12 weeks to include the following interventions: Gait  Training, Manual Therapy, Moist Heat/ Ice, Neuromuscular Re-ed, Patient Education, Self Care, Therapeutic Activities and Therapeutic Exercise.      Upon discharge from further skilled PT intervention it will determined if the need for a work conditioning program or Functional Capacity Evaluation is required to allow the injured worker to return to work with full potential achieved, continued improvement with body mechanics with advanced functional activities, and further prevention of future work-related injuries.       Maximino Blake, PT   5/18/2022

## 2022-05-17 DIAGNOSIS — G89.18 POST-OP PAIN: ICD-10-CM

## 2022-05-18 ENCOUNTER — CLINICAL SUPPORT (OUTPATIENT)
Dept: REHABILITATION | Facility: HOSPITAL | Age: 42
End: 2022-05-18
Attending: PHYSICIAN ASSISTANT
Payer: OTHER MISCELLANEOUS

## 2022-05-18 ENCOUNTER — PATIENT MESSAGE (OUTPATIENT)
Dept: ORTHOPEDICS | Facility: CLINIC | Age: 42
End: 2022-05-18
Payer: OTHER MISCELLANEOUS

## 2022-05-18 DIAGNOSIS — Z47.89 SURGICAL AFTERCARE, MUSCULOSKELETAL SYSTEM: ICD-10-CM

## 2022-05-18 DIAGNOSIS — T81.31XA WOUND DEHISCENCE, SURGICAL, INITIAL ENCOUNTER: Primary | ICD-10-CM

## 2022-05-18 DIAGNOSIS — M79.672 LEFT FOOT PAIN: Primary | ICD-10-CM

## 2022-05-18 PROCEDURE — 97110 THERAPEUTIC EXERCISES: CPT | Mod: PN,CQ

## 2022-05-18 RX ORDER — DOXYCYCLINE 150 MG/1
150 TABLET ORAL 2 TIMES DAILY
Qty: 14 TABLET | Refills: 0 | Status: SHIPPED | OUTPATIENT
Start: 2022-05-18 | End: 2022-05-26

## 2022-05-18 RX ORDER — OXYCODONE HYDROCHLORIDE 5 MG/1
5 TABLET ORAL EVERY 8 HOURS PRN
Qty: 21 TABLET | Refills: 0 | Status: SHIPPED | OUTPATIENT
Start: 2022-05-18 | End: 2022-05-24 | Stop reason: SDUPTHER

## 2022-05-18 NOTE — TELEPHONE ENCOUNTER
leg started swelling about 1 week ago, noticed a purulent about 4 days ago and today it is opened up.     SPR incision on left lower leg    7day rx for doxy  warrning against sun exposure to due medication    Left wound care products at the front with specific instructions for patient in the envelope for him to .    Chuck Heard MS, OTC   Sports Medicine Assistant   [facility]  (P) [phone]  (F) [phone]

## 2022-05-18 NOTE — PROGRESS NOTES
[employer] Physical Therapy Daily Treatment Note     Name: Albin Barfield  Clinic Number: [id number]    Therapy Diagnosis:   No diagnosis found.  Physician: Amelia Medrano PA-C    Visit Date: 5/18/2022    Physician Orders: PT Eval and Treat   Comments:   S/p Left ankle arthroscopy, debridement and microfracture  Left ankle brostrom with artelon reconstuction  Intraoperative use of fluoroscopy <1hr by Dr. Hager on 3-16-22.  PT: Start date: 4 weeks postop  Rx/protocol and restrictions:   --Gait training, edema control/desensitization modalities  --ADAT WBAT LLE, immobilization: boot; may initiate boot weaning 10 weeks postop   --AROM/AAROM and gentle strengthening/theraband work, but no PROM/manipulation until 10 weeks postop     Medical Diagnosis from Referral:   G57.32 (ICD-10-CM) - Peroneal neuritis, left   M93.272 (ICD-10-CM) - Osteochondritis dissecans of ankle, left   S93.402S (ICD-10-CM) - Inversion sprain of ankle, left, sequela      Evaluation Date: 4/18/2022  Authorization Period Expiration: 3/31/2023  Plan of Care Expiration: 5/27/2022  Progress Note Due: 5/27/2022  Visit # / Visits authorized: 11/12  FOTO: next at 12th visit  PTA: 1/5  (# of No Show Appts 0/Number of Cancelled Appts 0)    Time In: 1:45pm   Time Out: 2:45pm  Total Appointment Time (timed & untimed codes): 60 minutes (4TE)    Precautions: Standard and chronic pain, Sx precautions    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 Pt demonstrated clinician a picture of his wound which appears to contain  contains pus. Pt also reports that he experiences intermittent L ankle/foot swelling. Pt also endorses increased radicular symptoms in L foot when bending down. Has been  "trying to put a little more weight through the foot with his boot on but still having trouble, increased pain with. He was compliant with home exercise program.  Response to previous treatment: no adverse effects  Functional change: Able to put more weight through the L LE than when initiated PT    Pain: 6-7/10 (Range 6-8 and up to 10)  Location: dosum of foot as well anterior and lateral ankle and toes    OBJECTIVE     Objective Measures updated at progress report unless specified.   "when bending forward increases foot s/s"  Lumbar flexion in both standing and sitting ([facility] vs [facility]) increases L foot s/s!  SLR (+) L, (+) Slump test L from both seated and supine positioning   (+) PIR mm tightness with stretching activity     TREATMENT     Albin received the treatments listed below:      therapeutic exercises (bold exercises performed) to develop strength, endurance, ROM and flexibility for 60 minutes including activities below and screening above:    Seated sciatic nn glides/LAQ to s/s, not through s/s 2x10   Prone press up 2x15  Prone on elbows x3'  Supine PIR stretch 5x30" L LE contralateral LE in full supine  Supine SLR flexion - not to complete at f/u visits due to increased nn tension    Sidelying SLR 3x10, 3# proximal away from incision site  Supine bridging (to tolerance L LE roughly 25%) 2x10     Prone SLR 2x10  Prone HS curls 2x15     AROM in limited DF/PF x20    Standing lumbar repeated extension vs physioball in corner x15  Standing L LE Hip abduction with boot 2x15  Standing L LE hip extension with boot 2x15  Lateral weight shifting in standing with boot (as tolerated): 2x1'  Fwd/Bkw weight shifting in standing with boot as tolereated: x2'  L ankle place ups to 4" step with fwd weight shift: x2'    R LE lift offs to SLS tolerance with HHA PRN: x1'  R LE place ups to 6" step x10     manual therapy techniques  for 0 minutes, including: to begin at 10 weeks post-op on 5/25/2022.     PATIENT EDUCATION AND " HOME EXERCISES      Home Exercises Provided and Patient Education Provided     Education provided: Pt was educated on all therapeutic exercises initiated during today's tx visit.     Written Home Exercises Provided: Patient instructed to cont prior HEP. Exercises were reviewed and Albin was able to demonstrate them prior to the end of the session.  Albin demonstrated good  understanding of the education provided. See EMR under Patient Instructions for exercises provided during therapy sessions    ASSESSMENT   Albin is a 41 y.o. male referred to outpatient Physical Therapy with a medical diagnosis of Peroneal neuritis, left; Osteochondritis dissecans of ankle, left; Inversion sprain of ankle, left, sequela.   Mr. 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 He then had skilled PT intervention with no resolution of s/s and subsequently had ATSx on 3/16/2022. Procedure: Left ankle arthroscopy, debridement and microfracture, Left ankle brostrom with artelon reconstuction, Intraoperative use of fluoroscopy <1hr.      Currently protocol as follows:   --Gait training, edema control/desensitization modalities   --ADAT WBAT LLE, immobilization: boot; may initiate boot weaning 10 weeks postop   --AROM/AAROM and gentle strengthening/theraband work, but no PROM/manipulation until 10 weeks postop (5/25/2022)    Pt continues to report to session NWB L LE with scooter. Pt does present with boot which was utilized during WB activities. Pt demonstrated a picture of his wound in his lower L leg. The wound appears to be infected. Pt was instructed to send a message through [facility] to his physician, Mitali Hager. Pt continues to report increase in L foot/ankle pain, specifically about the great toe, when bending/forward flexing the lumbar spine (I.e. when bending down to grab his boot to don).Pt also stated that decreased trunk  "flexion is required to provoke nerve symptoms when compared to previous week. He also continues to present with ongoing severe L ankle/foot "nerve" pain upon arrival with ongoing significant hypersensitivity in dorsum of foot (to light touch, air blowing on the area, etc). He does have a new referral placed by his provider for lumbar spine imaging - awaiting approval at this time.   Despite ongoing moderate to severe s/s, pt did tolerate a mild increase in both open and closed chain treatment activities as indicated above. Utilized with WB activities focusing on improving WB status statically and dynamically. Planning to initiate B axillary crutch training as he is able to tolerate. Progress pt as tolerated.     Important to note the pt has 2 additional approved PT sessions. Will reach out to referring MD to request a new referral to be placed.     The patient's current job specific task deficits include the following:  L ankle pain, hypersensitivity to light touch, marked limited L ankle/foot AROM in all planes of motion, weakness and atrophy noted which has led to functional deficits including inability to ambulate at this time and therefore do any closed chain activity at this time.     Albin Is progressing well towards his goals.   Pt prognosis is Good.     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    Pt's spiritual, cultural and educational needs considered and pt agreeable to plan of care and goals.     Anticipated barriers to physical therapy: acuity of current injury, chronicity of current injury and current post-op status    Goals:     HEP:   - Pt will become compliant and independent with his initial HEP in 2 weeks to promote decreased pain and " improved mobility and strength. MET  - Pt will become compliant and independent with an updated, progressed HEP in 12 weeks to promote decreased pain and improved mobility and strength as well as prep for discharge from further PT intervention.   Pain:   - Pt to report decrease in pain levels from 10/10 at worse to 8/10 at worse in 2 weeks.  - Pt to report decrease in pain levels from 10/10 at worse to 6/10 at worse in 4 weeks.  - Pt to report decrease in pain levels from 10/10 at worse to 4/10 at worse in 8 weeks.  Impairment level:   - Pt to increase DF ROM to neutral (unable to achieve neutral on eval) by week 2 in order to promote improved ROM and ability to WBAT in boot.   - Pt to increase DF ROM to 10* by week 12 in order to promote return to function with ability to ambulate, squat, and perform stair negotiation.   - MMT goals will be set when appropriately able to assess; not indicated at this time.   Function level:   - Pt will improve his FOTO score from 85% impairment to 50% improvement to indicate improvement in overall function in 12 weeks.   - Pt will be able to place approx 50% weight through the LLE with boot in place in 2 weeks.   - Pt will be able to place approx 75% weight through the LLE with boot in place in 3 weeks.   - Pt will be able to ambulate with boot, WBAT in 4 weeks with AD as needed to promote energy conservation and community ambulation.   - Pt will be able to transition from boot to WBAT by week 10 post-op.      - RTW goals will be set at future visits when appropriately able to assess baseline measures.      Pt will return to work full duty, full time.    Plan   Plan of care Certification: 4/18/2022 to 7/8/2022.     Outpatient Physical Therapy 2 times weekly for 12 weeks to include the following interventions: Gait Training, Manual Therapy, Moist Heat/ Ice, Neuromuscular Re-ed, Patient Education, Self Care, Therapeutic Activities and Therapeutic Exercise.      Upon discharge from  further skilled PT intervention it will determined if the need for a work conditioning program or Functional Capacity Evaluation is required to allow the injured worker to return to work with full potential achieved, continued improvement with body mechanics with advanced functional activities, and further prevention of future work-related injuries.       Frankie Reyes, PTA   5/18/2022

## 2022-05-20 DIAGNOSIS — G57.32 PERONEAL NEURITIS, LEFT: Primary | ICD-10-CM

## 2022-05-20 DIAGNOSIS — M93.272 OSTEOCHONDRITIS DISSECANS OF ANKLE, LEFT: ICD-10-CM

## 2022-05-20 DIAGNOSIS — S82.62XD CLOSED AVULSION FRACTURE OF LATERAL MALLEOLUS OF LEFT FIBULA WITH ROUTINE HEALING, SUBSEQUENT ENCOUNTER: ICD-10-CM

## 2022-05-23 ENCOUNTER — TELEPHONE (OUTPATIENT)
Dept: ORTHOPEDICS | Facility: CLINIC | Age: 42
End: 2022-05-23
Payer: OTHER MISCELLANEOUS

## 2022-05-23 ENCOUNTER — CLINICAL SUPPORT (OUTPATIENT)
Dept: REHABILITATION | Facility: HOSPITAL | Age: 42
End: 2022-05-23
Payer: OTHER MISCELLANEOUS

## 2022-05-23 DIAGNOSIS — M79.672 LEFT FOOT PAIN: Primary | ICD-10-CM

## 2022-05-23 DIAGNOSIS — Z47.89 SURGICAL AFTERCARE, MUSCULOSKELETAL SYSTEM: ICD-10-CM

## 2022-05-23 PROCEDURE — 97110 THERAPEUTIC EXERCISES: CPT | Mod: PN

## 2022-05-23 NOTE — TELEPHONE ENCOUNTER
Wound is healing     duoderm over next 72 hours. Then send picture for next steps.     Chuck Heard MS, OTC   Sports Medicine Assistant   [facility]  (P) [phone]  (F) [phone]

## 2022-05-23 NOTE — PROGRESS NOTES
Work[employer]' [employer] Physical Therapy Daily Treatment Note     Name: Albin Barfield  Clinic Number: [id number]    Therapy Diagnosis:   Encounter Diagnoses   Name Primary?    Left foot pain Yes    Surgical aftercare, musculoskeletal system      Physician: Amelia Medrano PA-C    Visit Date: 5/23/2022    Physician Orders: PT Eval and Treat   Comments:   S/p Left ankle arthroscopy, debridement and microfracture  Left ankle brostrom with artelon reconstuction  Intraoperative use of fluoroscopy <1hr by Dr. Hager on 3-16-22.  PT: Start date: 4 weeks postop  Rx/protocol and restrictions:   --Gait training, edema control/desensitization modalities  --ADAT WBAT LLE, immobilization: boot; may initiate boot weaning 10 weeks postop   --AROM/AAROM and gentle strengthening/theraband work, but no PROM/manipulation until 10 weeks postop     Medical Diagnosis from Referral:   G57.32 (ICD-10-CM) - Peroneal neuritis, left   M93.272 (ICD-10-CM) - Osteochondritis dissecans of ankle, left   S93.402S (ICD-10-CM) - Inversion sprain of ankle, left, sequela      Evaluation Date: 4/18/2022  Authorization Period Expiration: 3/31/2023  Plan of Care Expiration: 5/27/2022  Progress Note Due: 5/27/2022  Visit # / Visits authorized: 12/12  FOTO: next at 12th visit  PTA: 0/5  (# of No Show Appts 0/Number of Cancelled Appts 0)    Time In: 1:00 pm   Time Out: 2:00 pm  Total Appointment Time (timed & untimed codes): 60 minutes (4TE)    Precautions: Standard and chronic pain, Sx precautions    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 Continued nerve pain that is reproduced with slump position.   Response to previous treatment: no adverse effects  Functional change: Able to put more weight through  "the L LE than when initiated PT    Pain: 6-7/10 (Range 6-8 and up to 10)  Location: dosum of foot as well anterior and lateral ankle and toes    OBJECTIVE     Objective Measures updated at progress report unless specified.   "when bending forward increases foot s/s"  Lumbar flexion in both standing and sitting ([facility] vs [facility]) increases L foot s/s!  SLR (+) L, (+) Slump test L from both seated and supine positioning   (+) PIR mm tightness with stretching activity     TREATMENT     Albin received the treatments listed below:      therapeutic exercises (bold exercises performed) to develop strength, endurance, ROM and flexibility for 60 minutes including activities below and screening above:    Seated sciatic nn glides/LAQ to s/s, not through s/s 2x10   Prone press up 2x10 3 sec hold  Prone on elbows x3'  Supine PIR stretch 5x30" L LE contralateral LE in full supine  Supine SLR flexion - not to complete at f/u visits due to increased nn tension    Sidelying SLR 3x10, 3# proximal away from incision site  Supine bridging (to tolerance L LE roughly 25%) 2x10     Prone SLR 2x10  Prone HS curls 2x15     AROM in limited DF/PF x20    Towel crunches 2x10   Standing lumbar repeated extension vs physioball in corner x 15  Standing back extensions (propped against table) 5x  Standing L LE Hip abduction with boot 2x15  Standing L LE hip extension with boot 2x15  Lateral weight shifting in standing with boot (as tolerated): 20x  Fwd/Bkw weight shifting in standing with boot as tolereated: 20x  L ankle place ups to 4" step with fwd weight shift: 20x    R LE lift offs to SLS tolerance with HHA PRN: x1'  R LE place ups to 6" step x10     manual therapy techniques  for 0 minutes, including: to begin at 10 weeks post-op on 5/25/2022.     PATIENT EDUCATION AND HOME EXERCISES      Home Exercises Provided and Patient Education Provided     Education provided: Pt was educated on all therapeutic exercises initiated during today's tx visit. " "    Written Home Exercises Provided: Patient instructed to cont prior HEP. Exercises were reviewed and Albin was able to demonstrate them prior to the end of the session.  Albin demonstrated good  understanding of the education provided. See EMR under Patient Instructions for exercises provided during therapy sessions    ASSESSMENT   Albin is a 41 y.o. male referred to outpatient Physical Therapy with a medical diagnosis of Peroneal neuritis, left; Osteochondritis dissecans of ankle, left; Inversion sprain of ankle, left, sequela.   Mr. 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 He then had skilled PT intervention with no resolution of s/s and subsequently had ATSx on 3/16/2022. Procedure: Left ankle arthroscopy, debridement and microfracture, Left ankle brostrom with artelon reconstuction, Intraoperative use of fluoroscopy <1hr.       Currently protocol as follows:   --Gait training, edema control/desensitization modalities   --ADAT WBAT LLE, immobilization: boot; may initiate boot weaning 10 weeks postop   --AROM/AAROM and gentle strengthening/theraband work, but no PROM/manipulation until 10 weeks postop (5/25/2022)    Pt continues to report to session NWB L LE with scooter. Pt does present with boot which was utilized during WB activities. Pt demonstrated a picture of his wound in his lower L leg. The wound appears to be infected. Pt was instructed to send a message through [facility] to his physician, Mitali Hager. Pt continues to report increase in L foot/ankle pain, specifically about the great toe, when bending/forward flexing the lumbar spine (I.e. when bending down to grab his boot to don).Pt also stated that decreased trunk flexion is required to provoke nerve symptoms when compared to previous week. He also continues to present with ongoing severe L ankle/foot "nerve" pain upon arrival with ongoing " significant hypersensitivity in dorsum of foot (to light touch, air blowing on the area, etc). He does have a new referral placed by his provider for lumbar spine imaging - awaiting approval at this time.   Despite ongoing moderate to severe s/s, pt did tolerate a mild increase in both open and closed chain treatment activities as indicated above. Utilized with WB activities focusing on improving WB status statically and dynamically. Planning to initiate B axillary crutch training as he is able to tolerate. Progress pt as tolerated.     Important to note the pt has 2 additional approved PT sessions. Will reach out to referring MD to request a new referral to be placed.     Currently: Pt presents for last authorized visit. He is still concerned with his lower leg wound. I attached a picture under media tab and staff messaged Dr. Hager's medical assistant via [employer] as pt was unsure if he should still continue with the same recommendations they for the wound that he was given last week.  Continued to focus on extension based exercises and sciatic nerve glides in hopes of reducing distal symptoms. Bridges and well as WB in boot increased nerve pain into toes. Awaiting more auth as pt continues to remain appropriate for skilled PT to improve post op pain, ROM, and strength.     The patient's current job specific task deficits include the following:  L ankle pain, hypersensitivity to light touch, marked limited L ankle/foot AROM in all planes of motion, weakness and atrophy noted which has led to functional deficits including inability to ambulate at this time and therefore do any closed chain activity at this time.     Albin Is progressing well towards his goals.   Pt prognosis is Good.     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    Pt's spiritual, cultural and educational needs considered and pt agreeable to plan of care and goals.     Anticipated barriers to physical therapy: acuity of current injury, chronicity of current injury and current post-op status    Goals:     HEP:   - Pt will become compliant and independent with his initial HEP in 2 weeks to promote decreased pain and improved mobility and strength. MET  - Pt will become compliant and independent with an updated, progressed HEP in 12 weeks to promote decreased pain and improved mobility and strength as well as prep for discharge from further PT intervention.   Pain:   - Pt to report decrease in pain levels from 10/10 at worse to 8/10 at worse in 2 weeks.  - Pt to report decrease in pain levels from 10/10 at worse to 6/10 at worse in 4 weeks.  - Pt to report decrease in pain levels from 10/10 at worse to 4/10 at worse in 8 weeks.  Impairment level:   - Pt to increase DF ROM to neutral (unable to achieve neutral on eval) by week 2 in order to promote improved ROM and ability to WBAT in boot.   - Pt to increase DF ROM to 10* by week 12 in order to promote return to function with ability to ambulate, squat, and perform stair negotiation.   - MMT goals will be set when appropriately able to assess; not indicated at this time.   Function level:   - Pt will improve his FOTO score from 85% impairment to 50% improvement to indicate improvement in overall function in 12 weeks.   - Pt will be able to place approx 50% weight through the LLE with boot in place in 2 weeks.   - Pt will be able to place approx 75% weight through the LLE with boot in place in 3 weeks.   - Pt will be able to ambulate with boot, WBAT in 4 weeks with AD as needed to promote energy conservation and community ambulation.   - Pt will be able to transition from boot to WBAT by week 10 post-op.      - RTW goals will be set at future visits when  appropriately able to assess baseline measures.      Pt will return to work full duty, full time.    Plan   Plan of care Certification: 4/18/2022 to 7/8/2022.     Outpatient Physical Therapy 2 times weekly for 12 weeks to include the following interventions: Gait Training, Manual Therapy, Moist Heat/ Ice, Neuromuscular Re-ed, Patient Education, Self Care, Therapeutic Activities and Therapeutic Exercise.      Upon discharge from further skilled PT intervention it will determined if the need for a work conditioning program or Functional Capacity Evaluation is required to allow the injured worker to return to work with full potential achieved, continued improvement with body mechanics with advanced functional activities, and further prevention of future work-related injuries.       TISHA SPARROW, PT   5/23/2022

## 2022-05-24 DIAGNOSIS — G89.18 POST-OP PAIN: ICD-10-CM

## 2022-05-24 RX ORDER — GABAPENTIN 300 MG/1
300 CAPSULE ORAL 3 TIMES DAILY
Qty: 90 CAPSULE | Refills: 2 | Status: SHIPPED | OUTPATIENT
Start: 2022-05-24 | End: 2024-02-01

## 2022-05-24 RX ORDER — OXYCODONE HYDROCHLORIDE 5 MG/1
5 TABLET ORAL EVERY 12 HOURS PRN
Qty: 14 TABLET | Refills: 0 | Status: SHIPPED | OUTPATIENT
Start: 2022-05-24 | End: 2022-06-02 | Stop reason: SDUPTHER

## 2022-05-25 NOTE — TELEPHONE ENCOUNTER
reviewed and appropriate.  As discussed will decrease to q12 hours.  7 day supply dispensed.  Pain referral pending.

## 2022-05-27 ENCOUNTER — CLINICAL SUPPORT (OUTPATIENT)
Dept: REHABILITATION | Facility: HOSPITAL | Age: 42
End: 2022-05-27
Attending: ORTHOPAEDIC SURGERY
Payer: OTHER MISCELLANEOUS

## 2022-05-27 DIAGNOSIS — M93.272 OSTEOCHONDRITIS DISSECANS OF ANKLE, LEFT: ICD-10-CM

## 2022-05-27 DIAGNOSIS — G57.32 PERONEAL NEURITIS, LEFT: ICD-10-CM

## 2022-05-27 DIAGNOSIS — Z47.89 SURGICAL AFTERCARE, MUSCULOSKELETAL SYSTEM: ICD-10-CM

## 2022-05-27 DIAGNOSIS — S82.62XD CLOSED AVULSION FRACTURE OF LATERAL MALLEOLUS OF LEFT FIBULA WITH ROUTINE HEALING, SUBSEQUENT ENCOUNTER: ICD-10-CM

## 2022-05-27 DIAGNOSIS — M79.672 LEFT FOOT PAIN: Primary | ICD-10-CM

## 2022-05-27 PROCEDURE — 97110 THERAPEUTIC EXERCISES: CPT | Mod: PN,CQ

## 2022-05-27 NOTE — PROGRESS NOTES
[employer] Physical Therapy Daily Treatment Note     Name: Albin Barfield  Clinic Number: [id number]    Therapy Diagnosis:   No diagnosis found.  Physician: Mitali Hager MD    Visit Date: 5/27/2022    Physician Orders: PT Eval and Treat   Comments:   S/p Left ankle arthroscopy, debridement and microfracture  Left ankle brostrom with artelon reconstuction  Intraoperative use of fluoroscopy <1hr by Dr. Hager on 3-16-22.  PT: Start date: 4 weeks postop  Rx/protocol and restrictions:   --Gait training, edema control/desensitization modalities  --ADAT WBAT LLE, immobilization: boot; may initiate boot weaning 10 weeks postop   --AROM/AAROM and gentle strengthening/theraband work, but no PROM/manipulation until 10 weeks postop     Medical Diagnosis from Referral:   G57.32 (ICD-10-CM) - Peroneal neuritis, left   M93.272 (ICD-10-CM) - Osteochondritis dissecans of ankle, left   S93.402S (ICD-10-CM) - Inversion sprain of ankle, left, sequela      Evaluation Date: 4/18/2022  Authorization Period Expiration: 3/31/2023  Plan of Care Expiration: 5/27/2022  Progress Note Due: 5/27/2022  Visit # / Visits authorized: 12/12, 1/12  FOTO: next at 24th visit  PTA: 1/5  (# of No Show Appts 0/Number of Cancelled Appts 0)    Time In: 12:45 pm   Time Out: 1:45 pm  Total Appointment Time (timed & untimed codes): 60 minutes (4TE)    Precautions: Standard and chronic pain, Sx precautions    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 Pt, LLE wound appears to be improving compared the previous tx visits.  Response to previous treatment: no adverse  "effects  Functional change: Able to put more weight through the L LE than when initiated PT    Pain: 6-7/10   Location: dosum of foot as well anterior and lateral ankle and toes    OBJECTIVE     Objective Measures updated at progress report unless specified.   "when bending forward increases foot s/s"  Lumbar flexion in both standing and sitting ([facility] vs [facility]) increases L foot s/s!  SLR (+) L, (+) Slump test L from both seated and supine positioning   (+) PIR mm tightness with stretching activity     TREATMENT     Albin received the treatments listed below:      therapeutic exercises (bold exercises performed) to develop strength, endurance, ROM and flexibility for 60 minutes including activities below and screening above:    Seated sciatic nn glides/LAQ to s/s, not through s/s 2x10   Prone press up 2x10 3 sec hold  Prone on elbows x3'  Supine PIR stretch 5x30" L LE contralateral LE in full supine  Supine SLR flexion - not to complete at f/u visits due to increased nn tension  Sidelying SLR 3x10, 3# proximal away from incision site  Supine bridging (to tolerance L LE roughly 25%) 2x10   Prone SLR 2x10  Prone HS curls 2x15     AROM in limited DF/PF x20    Towel crunches 2x10   Standing lumbar repeated extension vs physioball in corner x 15  Standing back extensions (propped against table) 5x  Standing L LE Hip abduction with boot 2x15  Standing L LE hip extension with boot 2x15  Lateral weight shifting in standing with boot (as tolerated): 20x  Fwd/Bkw weight shifting in standing with boot as tolereated: 20x    L ankle place ups to 4" step with fwd weight shift: 20x    R LE lift offs to SLS tolerance with HHA PRN: x1'  R LE place ups to 6" step x10     manual therapy techniques  for 0 minutes, including: to begin at 10 weeks post-op on 5/25/2022.     PATIENT EDUCATION AND HOME EXERCISES      Home Exercises Provided and Patient Education Provided     Education provided: Pt was educated on all therapeutic exercises " "initiated during today's tx visit.     Written Home Exercises Provided: Patient instructed to cont prior HEP. Exercises were reviewed and Albin was able to demonstrate them prior to the end of the session.  Albin demonstrated good  understanding of the education provided. See EMR under Patient Instructions for exercises provided during therapy sessions    ASSESSMENT   Albin is a 41 y.o. male referred to outpatient Physical Therapy with a medical diagnosis of Peroneal neuritis, left; Osteochondritis dissecans of ankle, left; Inversion sprain of ankle, left, sequela.   Mr. 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 He then had skilled PT intervention with no resolution of s/s and subsequently had ATSx on 3/16/2022. Procedure: Left ankle arthroscopy, debridement and microfracture, Left ankle brostrom with artelon reconstuction, Intraoperative use of fluoroscopy <1hr.       Currently protocol as follows:   --Gait training, edema control/desensitization modalities   --ADAT WBAT LLE, immobilization: boot; may initiate boot weaning 10 weeks postop   --AROM/AAROM and gentle strengthening/theraband work, but no PROM/manipulation until 10 weeks postop (5/25/2022)    Pt continues to report to session NWB L LE with scooter. Pt does present with boot which was utilized during WB activities. Pt demonstrated a picture of his wound in his lower L leg. The wound appears to be infected. Pt was instructed to send a message through [facility] to his physician, Mitali Hager. Pt continues to report increase in L foot/ankle pain, specifically about the great toe, when bending/forward flexing the lumbar spine (I.e. when bending down to grab his boot to don).Pt also stated that decreased trunk flexion is required to provoke nerve symptoms when compared to previous week. He also continues to present with ongoing severe L ankle/foot "nerve" " pain upon arrival with ongoing significant hypersensitivity in dorsum of foot (to light touch, air blowing on the area, etc). He does have a new referral placed by his provider for lumbar spine imaging - awaiting approval at this time.   Despite ongoing moderate to severe s/s, pt did tolerate a mild increase in both open and closed chain treatment activities as indicated above. Utilized with WB activities focusing on improving WB status statically and dynamically. Planning to initiate B axillary crutch training as he is able to tolerate. Progress pt as tolerated.     Important to note the pt has 2 additional approved PT sessions. Will reach out to referring MD to request a new referral to be placed.     Currently: Pt presents for the 13th of 24th visits. It was noted that his distal LLE wound appears to improving. Continued to focus on extension based exercises and sciatic nerve glides in hopes of reducing distal symptoms. Bridges as well as WB in boot increased nerve pain into toes. Pt would benefit from continuing skilled PT to improve post op pain, ROM, and strength.     The patient's current job specific task deficits include the following:  L ankle pain, hypersensitivity to light touch, marked limited L ankle/foot AROM in all planes of motion, weakness and atrophy noted which has led to functional deficits including inability to ambulate at this time and therefore do any closed chain activity at this time.     Albin Is progressing well towards his goals.   Pt prognosis is Good.     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    Pt's spiritual, cultural and educational needs considered and pt agreeable to plan of care and goals.     Anticipated barriers to  physical therapy: acuity of current injury, chronicity of current injury and current post-op status    Goals:     HEP:   - Pt will become compliant and independent with his initial HEP in 2 weeks to promote decreased pain and improved mobility and strength. MET  - Pt will become compliant and independent with an updated, progressed HEP in 12 weeks to promote decreased pain and improved mobility and strength as well as prep for discharge from further PT intervention.   Pain:   - Pt to report decrease in pain levels from 10/10 at worse to 8/10 at worse in 2 weeks.  - Pt to report decrease in pain levels from 10/10 at worse to 6/10 at worse in 4 weeks.  - Pt to report decrease in pain levels from 10/10 at worse to 4/10 at worse in 8 weeks.  Impairment level:   - Pt to increase DF ROM to neutral (unable to achieve neutral on eval) by week 2 in order to promote improved ROM and ability to WBAT in boot.   - Pt to increase DF ROM to 10* by week 12 in order to promote return to function with ability to ambulate, squat, and perform stair negotiation.   - MMT goals will be set when appropriately able to assess; not indicated at this time.   Function level:   - Pt will improve his FOTO score from 85% impairment to 50% improvement to indicate improvement in overall function in 12 weeks.   - Pt will be able to place approx 50% weight through the LLE with boot in place in 2 weeks.   - Pt will be able to place approx 75% weight through the LLE with boot in place in 3 weeks.   - Pt will be able to ambulate with boot, WBAT in 4 weeks with AD as needed to promote energy conservation and community ambulation.   - Pt will be able to transition from boot to WBAT by week 10 post-op.      - RTW goals will be set at future visits when appropriately able to assess baseline measures.      Pt will return to work full duty, full time.    Plan   Plan of care Certification: 4/18/2022 to 7/8/2022.     Outpatient Physical Therapy 2 times weekly  for 12 weeks to include the following interventions: Gait Training, Manual Therapy, Moist Heat/ Ice, Neuromuscular Re-ed, Patient Education, Self Care, Therapeutic Activities and Therapeutic Exercise.      Upon discharge from further skilled PT intervention it will determined if the need for a work conditioning program or Functional Capacity Evaluation is required to allow the injured worker to return to work with full potential achieved, continued improvement with body mechanics with advanced functional activities, and further prevention of future work-related injuries.       Frankie Reyes, PTA   5/27/2022

## 2022-05-31 ENCOUNTER — CLINICAL SUPPORT (OUTPATIENT)
Dept: REHABILITATION | Facility: HOSPITAL | Age: 42
End: 2022-05-31
Attending: ORTHOPAEDIC SURGERY
Payer: OTHER MISCELLANEOUS

## 2022-05-31 DIAGNOSIS — Z47.89 SURGICAL AFTERCARE, MUSCULOSKELETAL SYSTEM: ICD-10-CM

## 2022-05-31 DIAGNOSIS — M79.672 LEFT FOOT PAIN: Primary | ICD-10-CM

## 2022-05-31 PROCEDURE — 97110 THERAPEUTIC EXERCISES: CPT | Mod: PN,CQ

## 2022-05-31 NOTE — PROGRESS NOTES
[employer] Physical Therapy Daily Treatment Note     Name: Albin Barfield  Clinic Number: [id number]    Therapy Diagnosis:   No diagnosis found.  Physician: Mitali Hager MD    Visit Date: 5/31/2022    Physician Orders: PT Eval and Treat   Comments:   S/p Left ankle arthroscopy, debridement and microfracture  Left ankle brostrom with artelon reconstuction  Intraoperative use of fluoroscopy <1hr by Dr. Hager on 3-16-22.  PT: Start date: 4 weeks postop  Rx/protocol and restrictions:   --Gait training, edema control/desensitization modalities  --ADAT WBAT LLE, immobilization: boot; may initiate boot weaning 10 weeks postop   --AROM/AAROM and gentle strengthening/theraband work, but no PROM/manipulation until 10 weeks postop     Medical Diagnosis from Referral:   G57.32 (ICD-10-CM) - Peroneal neuritis, left   M93.272 (ICD-10-CM) - Osteochondritis dissecans of ankle, left   S93.402S (ICD-10-CM) - Inversion sprain of ankle, left, sequela      Evaluation Date: 4/18/2022  Authorization Period Expiration: 3/31/2023  Plan of Care Expiration: 5/27/2022  Progress Note Due: 5/27/2022  Visit # / Visits authorized: 12/12, 2/12  FOTO: next at 24th visit  PTA: 2/5  (# of No Show Appts 0/Number of Cancelled Appts 0)    Time In: 1:45 pm   Time Out: 2:45 pm  Total Appointment Time (timed & untimed codes): 60 minutes (4TE)    Precautions: Standard and chronic pain, Sx precautions    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  Response to previous treatment: no adverse effects  Functional change: Able to put more weight through  "the L LE than when initiated PT    Pain: 6-7/10, 3/10 LBP, 1/10 sitting 5-10/10 R calf   Location: dosum of foot as well anterior and lateral ankle and toes, low back, R calf    OBJECTIVE     Objective Measures updated at progress report unless specified.   "when bending forward increases foot s/s"  Lumbar flexion in both standing and sitting ([facility] vs [facility]) increases L foot s/s!  SLR (+) L, (+) Slump test L from both seated and supine positioning   (+) PIR mm tightness with stretching activity     TREATMENT     Albin received the treatments listed below:      therapeutic exercises (bold exercises performed) to develop strength, endurance, ROM and flexibility for 60 minutes including activities below and screening above:    Seated sciatic nn glides/LAQ to s/s, not through s/s 2x10   Prone on elbows x3'  Prone press up 2x10 3 sec hold  Supine PIR stretch 5x30" L LE contralateral LE in full supine  R Gastroc/soleus stretches in sitting 2x30 sec  Supine SLR flexion - not to complete at f/u visits due to increased nn tension  Sidelying SLR 3x10, 3# proximal away from incision site  Supine bridging (to tolerance L LE roughly 25%) 2x10   Prone SLR 2x10  Prone HS curls 2x15   AROM in limited DF/PF x20  Towel crunches 2x10   Standing lumbar repeated extension vs physioball in corner x 15  Standing back extensions (propped against table) 5x (increased dorsal L foot pain)   Standing L LE Hip abduction with boot 2x15  Standing L LE hip extension with boot 2x15  Lateral weight shifting in standing with boot (as tolerated): 20x  Fwd/Bkw weight shifting in standing with boot as tolereated: 20x  L ankle place ups to 4" step with fwd weight shift: 20x    R LE lift offs to SLS tolerance with HHA PRN: x1'  R LE place ups to 6" step x10     manual therapy techniques  for 0 minutes, including: to begin at 10 weeks post-op on 5/25/2022.     PATIENT EDUCATION AND HOME EXERCISES      Home Exercises Provided and Patient Education Provided "     Education provided: Pt was educated on all therapeutic exercises initiated during today's tx visit.     Written Home Exercises Provided: Patient instructed to cont prior HEP. Exercises were reviewed and Albin was able to demonstrate them prior to the end of the session.  Albin demonstrated good  understanding of the education provided. See EMR under Patient Instructions for exercises provided during therapy sessions    ASSESSMENT   Albin is a 41 y.o. male referred to outpatient Physical Therapy with a medical diagnosis of Peroneal neuritis, left; Osteochondritis dissecans of ankle, left; Inversion sprain of ankle, left, sequela.   Mr. 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 He then had skilled PT intervention with no resolution of s/s and subsequently had ATSx on 3/16/2022. Procedure: Left ankle arthroscopy, debridement and microfracture, Left ankle brostrom with artelon reconstuction, Intraoperative use of fluoroscopy <1hr.       Currently protocol as follows:   --Gait training, edema control/desensitization modalities   --ADAT WBAT LLE, immobilization: boot; may initiate boot weaning 10 weeks postop   --AROM/AAROM and gentle strengthening/theraband work, but no PROM/manipulation until 10 weeks postop (5/25/2022)    Pt continues to report to session NWB L LE with scooter. Pt does present with boot which was utilized during WB activities. Pt demonstrated a picture of his wound in his lower L leg. The wound appears to be infected. Pt was instructed to send a message through [facility] to his physician, Mitali Hager. Pt continues to report increase in L foot/ankle pain, specifically about the great toe, when bending/forward flexing the lumbar spine (I.e. when bending down to grab his boot to don).Pt also stated that decreased trunk flexion is required to provoke nerve symptoms when compared to previous week.  "He also continues to present with ongoing severe L ankle/foot "nerve" pain upon arrival with ongoing significant hypersensitivity in dorsum of foot (to light touch, air blowing on the area, etc). He does have a new referral placed by his provider for lumbar spine imaging - awaiting approval at this time.   Despite ongoing moderate to severe s/s, pt did tolerate a mild increase in both open and closed chain treatment activities as indicated above. Utilized with WB activities focusing on improving WB status statically and dynamically. Planning to initiate B axillary crutch training as he is able to tolerate. Progress pt as tolerated.     Important to note the pt has 2 additional approved PT sessions. Will reach out to referring MD to request a new referral to be placed.     Currently: Pt presents for the 14th of 24th visits. It was noted that his distal LLE wound appears to improving. Continued to focus on extension based exercises and sciatic nerve glides in hopes of reducing distal symptoms. Flexion based exercises as well as WB in boot increased nerve pain into toes. Pt would benefit from continuing skilled PT to improve post op pain, ROM, and strength.     The patient's current job specific task deficits include the following:  L ankle pain, hypersensitivity to light touch, marked limited L ankle/foot AROM in all planes of motion, weakness and atrophy noted which has led to functional deficits including inability to ambulate at this time and therefore do any closed chain activity at this time.     Albin Is progressing well towards his goals.   Pt prognosis is Good.     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    Pt's spiritual, cultural and educational " needs considered and pt agreeable to plan of care and goals.     Anticipated barriers to physical therapy: acuity of current injury, chronicity of current injury and current post-op status    Goals:     HEP:   - Pt will become compliant and independent with his initial HEP in 2 weeks to promote decreased pain and improved mobility and strength. MET  - Pt will become compliant and independent with an updated, progressed HEP in 12 weeks to promote decreased pain and improved mobility and strength as well as prep for discharge from further PT intervention.   Pain:   - Pt to report decrease in pain levels from 10/10 at worse to 8/10 at worse in 2 weeks.  - Pt to report decrease in pain levels from 10/10 at worse to 6/10 at worse in 4 weeks.  - Pt to report decrease in pain levels from 10/10 at worse to 4/10 at worse in 8 weeks.  Impairment level:   - Pt to increase DF ROM to neutral (unable to achieve neutral on eval) by week 2 in order to promote improved ROM and ability to WBAT in boot.   - Pt to increase DF ROM to 10* by week 12 in order to promote return to function with ability to ambulate, squat, and perform stair negotiation.   - MMT goals will be set when appropriately able to assess; not indicated at this time.   Function level:   - Pt will improve his FOTO score from 85% impairment to 50% improvement to indicate improvement in overall function in 12 weeks.   - Pt will be able to place approx 50% weight through the LLE with boot in place in 2 weeks.   - Pt will be able to place approx 75% weight through the LLE with boot in place in 3 weeks.   - Pt will be able to ambulate with boot, WBAT in 4 weeks with AD as needed to promote energy conservation and community ambulation.   - Pt will be able to transition from boot to WBAT by week 10 post-op.      - RTW goals will be set at future visits when appropriately able to assess baseline measures.      Pt will return to work full duty, full time.    Plan   Plan of  care Certification: 4/18/2022 to 7/8/2022.     Outpatient Physical Therapy 2 times weekly for 12 weeks to include the following interventions: Gait Training, Manual Therapy, Moist Heat/ Ice, Neuromuscular Re-ed, Patient Education, Self Care, Therapeutic Activities and Therapeutic Exercise.      Upon discharge from further skilled PT intervention it will determined if the need for a work conditioning program or Functional Capacity Evaluation is required to allow the injured worker to return to work with full potential achieved, continued improvement with body mechanics with advanced functional activities, and further prevention of future work-related injuries.       Frankie Reyes, PTA   5/31/2022

## 2022-06-02 ENCOUNTER — TELEPHONE (OUTPATIENT)
Dept: ORTHOPEDICS | Facility: CLINIC | Age: 42
End: 2022-06-02
Payer: OTHER MISCELLANEOUS

## 2022-06-02 DIAGNOSIS — G89.18 POST-OP PAIN: ICD-10-CM

## 2022-06-02 RX ORDER — PREGABALIN 150 MG/1
150 CAPSULE ORAL 2 TIMES DAILY
Qty: 60 CAPSULE | Refills: 0 | Status: SHIPPED | OUTPATIENT
Start: 2022-06-02 | End: 2024-02-01

## 2022-06-03 ENCOUNTER — CLINICAL SUPPORT (OUTPATIENT)
Dept: REHABILITATION | Facility: HOSPITAL | Age: 42
End: 2022-06-03
Attending: ORTHOPAEDIC SURGERY
Payer: OTHER MISCELLANEOUS

## 2022-06-03 DIAGNOSIS — M79.672 LEFT FOOT PAIN: Primary | ICD-10-CM

## 2022-06-03 DIAGNOSIS — Z47.89 SURGICAL AFTERCARE, MUSCULOSKELETAL SYSTEM: ICD-10-CM

## 2022-06-03 PROCEDURE — 97110 THERAPEUTIC EXERCISES: CPT | Mod: PN,CQ

## 2022-06-03 NOTE — PROGRESS NOTES
[employer] Physical Therapy Daily Treatment Note     Name: Albin Barfield  Clinic Number: [id number]    Therapy Diagnosis:   No diagnosis found.  Physician: Mitali Hager MD    Visit Date: 6/3/2022    Physician Orders: PT Eval and Treat   Comments:   S/p Left ankle arthroscopy, debridement and microfracture  Left ankle brostrom with artelon reconstuction  Intraoperative use of fluoroscopy <1hr by Dr. Hager on 3-16-22.  PT: Start date: 4 weeks postop  Rx/protocol and restrictions:   --Gait training, edema control/desensitization modalities  --ADAT WBAT LLE, immobilization: boot; may initiate boot weaning 10 weeks postop   --AROM/AAROM and gentle strengthening/theraband work, but no PROM/manipulation until 10 weeks postop     Medical Diagnosis from Referral:   G57.32 (ICD-10-CM) - Peroneal neuritis, left   M93.272 (ICD-10-CM) - Osteochondritis dissecans of ankle, left   S93.402S (ICD-10-CM) - Inversion sprain of ankle, left, sequela      Evaluation Date: 4/18/2022  Authorization Period Expiration: 3/31/2023  Plan of Care Expiration: 5/27/2022  Progress Note Due: 5/27/2022  Visit # / Visits authorized: 12/12, 3/12  FOTO: next at 24th visit  PTA: 3/5  (# of No Show Appts 0/Number of Cancelled Appts 0)    Time In: 1:45 pm   Time Out: 2:45 pm  Total Appointment Time (timed & untimed codes): 60 minutes (4TE)    Precautions: Standard and chronic pain, Sx precautions    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  Response to previous treatment: no adverse effects  Functional change: Able to put more weight  "through the L LE than when initiated PT    Pain: 6-7/10, 3/10 LBP, 4-5/10 R calf   Location: dosum of foot as well anterior and lateral ankle and toes, low back, R calf    OBJECTIVE     Objective Measures updated at progress report unless specified.   "when bending forward increases foot s/s"  Lumbar flexion in both standing and sitting ([facility] vs [facility]) increases L foot s/s!  SLR (+) L, (+) Slump test L from both seated and supine positioning   (+) PIR mm tightness with stretching activity     TREATMENT     Albin received the treatments listed below:      therapeutic exercises (bold exercises performed) to develop strength, endurance, ROM and flexibility for 60 minutes including activities below and screening above:    Seated sciatic nn glides/LAQ to s/s, not through s/s 2x10   Prone on elbows x3'  Prone press up 2x10 3 sec hold  Supine PIR stretch 5x30" L LE contralateral LE in full supine  R Gastroc/soleus stretches in sitting 2x30 sec  Supine SLR flexion - not to complete at f/u visits due to increased nn tension  Sidelying SLR 3x10, 3# proximal away from incision site  Supine bridging (to tolerance L LE roughly 25%) 2x10   Prone SLR 2x10  Prone HS curls 2x15   AROM in limited DF/PF x20  Towel crunches 2x10   Standing lumbar repeated extension vs physioball in corner x 15    Standing back extensions (propped against table) 5x (increased dorsal L foot pain)   Standing L LE Hip abduction with boot 2x15  Standing L LE hip extension with boot 2x15  Lateral weight shifting in standing with boot (as tolerated): 20x    Fwd/Bkw weight shifting in standing with boot as tolereated: 20x  L ankle place ups to 4" step with fwd weight shift: 20x    R LE lift offs to SLS tolerance with HHA PRN: x1'  R LE place ups to 6" step x10     manual therapy techniques  for 0 minutes, including: to begin at 10 weeks post-op on 5/25/2022.     PATIENT EDUCATION AND HOME EXERCISES      Home Exercises Provided and Patient Education Provided "     Education provided: Pt was educated on all therapeutic exercises initiated during today's tx visit.     Written Home Exercises Provided: Patient instructed to cont prior HEP. Exercises were reviewed and Albin was able to demonstrate them prior to the end of the session.  Albin demonstrated good  understanding of the education provided. See EMR under Patient Instructions for exercises provided during therapy sessions    ASSESSMENT   Albin is a 41 y.o. male referred to outpatient Physical Therapy with a medical diagnosis of Peroneal neuritis, left; Osteochondritis dissecans of ankle, left; Inversion sprain of ankle, left, sequela.   Mr. 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 He then had skilled PT intervention with no resolution of s/s and subsequently had ATSx on 3/16/2022. Procedure: Left ankle arthroscopy, debridement and microfracture, Left ankle brostrom with artelon reconstuction, Intraoperative use of fluoroscopy <1hr.       Currently protocol as follows:   --Gait training, edema control/desensitization modalities   --ADAT WBAT LLE, immobilization: boot; may initiate boot weaning 10 weeks postop   --AROM/AAROM and gentle strengthening/theraband work, but no PROM/manipulation until 10 weeks postop (5/25/2022)    Pt continues to report to session NWB L LE with scooter. Pt does present with boot which was utilized during WB activities. Pt demonstrated a picture of his wound in his lower L leg. The wound appears to be infected. Pt was instructed to send a message through [employer] to his physician, Mitali Hager. Pt continues to report increase in L foot/ankle pain, specifically about the great toe, when bending/forward flexing the lumbar spine (I.e. when bending down to grab his boot to don).Pt also stated that decreased trunk flexion is required to provoke nerve symptoms when compared to previous week.  "He also continues to present with ongoing severe L ankle/foot "nerve" pain upon arrival with ongoing significant hypersensitivity in dorsum of foot (to light touch, air blowing on the area, etc). He does have a new referral placed by his provider for lumbar spine imaging - awaiting approval at this time.   Despite ongoing moderate to severe s/s, pt did tolerate a mild increase in both open and closed chain treatment activities as indicated above. Utilized with WB activities focusing on improving WB status statically and dynamically. Planning to initiate B axillary crutch training as he is able to tolerate. Progress pt as tolerated.     Important to note the pt has 2 additional approved PT sessions. Will reach out to referring MD to request a new referral to be placed.     Currently: Pt presents for the 15th of 24th visits. It was noted that his distal LLE wound appears to improving as it continues to heel from the outside in. Continued to focus on extension based exercises and sciatic nerve glides in hopes of reducing distal symptoms. Flexion based exercises as well as WB in boot increased nerve pain into toes. Pt would benefit from continuing skilled PT to improve post op pain, ROM, and strength.     The patient's current job specific task deficits include the following:  L ankle pain, hypersensitivity to light touch, marked limited L ankle/foot AROM in all planes of motion, weakness and atrophy noted which has led to functional deficits including inability to ambulate at this time and therefore do any closed chain activity at this time.     Albin Is progressing well towards his goals.   Pt prognosis is Good.     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    Pt's spiritual, cultural and educational needs considered and pt agreeable to plan of care and goals.     Anticipated barriers to physical therapy: acuity of current injury, chronicity of current injury and current post-op status    Goals:     HEP:   - Pt will become compliant and independent with his initial HEP in 2 weeks to promote decreased pain and improved mobility and strength. MET  - Pt will become compliant and independent with an updated, progressed HEP in 12 weeks to promote decreased pain and improved mobility and strength as well as prep for discharge from further PT intervention.   Pain:   - Pt to report decrease in pain levels from 10/10 at worse to 8/10 at worse in 2 weeks.  - Pt to report decrease in pain levels from 10/10 at worse to 6/10 at worse in 4 weeks.  - Pt to report decrease in pain levels from 10/10 at worse to 4/10 at worse in 8 weeks.  Impairment level:   - Pt to increase DF ROM to neutral (unable to achieve neutral on eval) by week 2 in order to promote improved ROM and ability to WBAT in boot.   - Pt to increase DF ROM to 10* by week 12 in order to promote return to function with ability to ambulate, squat, and perform stair negotiation.   - MMT goals will be set when appropriately able to assess; not indicated at this time.   Function level:   - Pt will improve his FOTO score from 85% impairment to 50% improvement to indicate improvement in overall function in 12 weeks.   - Pt will be able to place approx 50% weight through the LLE with boot in place in 2 weeks.   - Pt will be able to place approx 75% weight through the LLE with boot in place in 3 weeks.   - Pt will be able to ambulate with boot, WBAT in 4 weeks with AD as needed to promote energy conservation and community ambulation.   - Pt will be able to transition from boot to WBAT by week 10 post-op.      - RTW goals will be set at future visits when appropriately able to assess baseline measures.      Pt will return  to work full duty, full time.    Plan   Plan of care Certification: 4/18/2022 to 7/8/2022.     Outpatient Physical Therapy 2 times weekly for 12 weeks to include the following interventions: Gait Training, Manual Therapy, Moist Heat/ Ice, Neuromuscular Re-ed, Patient Education, Self Care, Therapeutic Activities and Therapeutic Exercise.      Upon discharge from further skilled PT intervention it will determined if the need for a work conditioning program or Functional Capacity Evaluation is required to allow the injured worker to return to work with full potential achieved, continued improvement with body mechanics with advanced functional activities, and further prevention of future work-related injuries.       Frankie Reyes, PTA   6/3/2022

## 2022-06-06 ENCOUNTER — CLINICAL SUPPORT (OUTPATIENT)
Dept: REHABILITATION | Facility: HOSPITAL | Age: 42
End: 2022-06-06
Attending: ORTHOPAEDIC SURGERY
Payer: OTHER MISCELLANEOUS

## 2022-06-06 DIAGNOSIS — Z47.89 SURGICAL AFTERCARE, MUSCULOSKELETAL SYSTEM: ICD-10-CM

## 2022-06-06 DIAGNOSIS — M79.672 LEFT FOOT PAIN: Primary | ICD-10-CM

## 2022-06-06 PROCEDURE — 97110 THERAPEUTIC EXERCISES: CPT | Mod: PN

## 2022-06-06 NOTE — PLAN OF CARE
Outpatient Therapy Updated Plan of Care     Visit Date: 6/6/2022  Name: Albin Barfield  Clinic Number: [id number]    Therapy Diagnosis:   Encounter Diagnoses   Name Primary?    Left foot pain Yes    Surgical aftercare, musculoskeletal system      Physician: Mitali Hager MD      Physician Orders: PT Eval and Treat   Medical Diagnosis from Referral:   G57.32 (ICD-10-CM) - Peroneal neuritis, left   M93.272 (ICD-10-CM) - Osteochondritis dissecans of ankle, left   S93.402S (ICD-10-CM) - Inversion sprain of ankle, left, sequela   Evaluation Date: 4/18/2022      Total Visits Received: 16  Cancelled Visits: 0  No Show Visits: 0    Current Certification Period:  4/18/22 to 5/27/22  Precautions:  Standard and chronic pain, Sx precautions  Visits from Evaluation Date:  15  Functional Level Prior to Evaluation:  Independent with ambulation, able to work full duty       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 Pain is worse in both areas with weightbearing. He sees Dr. Hager this week.   Response to previous treatment: no adverse effects  Functional change: Able to put more weight through the L LE than when initiated PT    Pain: 6-7/10, 3/10 LBP, 4-5/10 R calf   Location: dosum of foot as well anterior and lateral ankle and toes, low back, R calf      Objective     Ankle AROM Left (*) = in degrees   Dorsiflexion -9   Plantarflexion  20   Inversion 10   Eversion 5      Ankle MMT NT due to severe TTP    Lumbar spine special tests: Positive SLR and Slump on L   Lumbar flexion in both standing and sitting ([facility] vs [facility]) increases L foot s/s!      Gait: utilizes kneeling scooter, limited tolerance to WB in boot    Assessment     Pt is almost 12 week s/p ( DOS  3/16/2022) Left ankle arthroscopy, debridement and microfracture, Left ankle brostrom with artelon reconstuction, Intraoperative use of fluoroscopy <1hr. Re-assessment/Updated POC performed today and pt has only met HEP goal #1 and no other goals this date. Pt has improved his ankle DF ROM since initial evaluation but ankle MMT not formally tested due to pain. His rehab process has been complicated by severe pain and  Hypersensitivity to light touch in L LE. Lumbar spine special testing reveals positive slump and SLR on L. Pt is awaiting scheduling for lumbar MRI. He also is dealing with poor wound healing and Dr. Hager is monitoring his wound closure. We slowly progressed ankle ROM today in sitting but held WB progressions due to high pain levels with NWB ankle ROM. Pt is cleared per protocol to wean out of boot but unable to tolerate at this point. Pt is compliant with the rehab process but overall progress has been limited due to pain levels. Pt will see Dr. Hager for follow up this week. Continues to remain appropriate for skilled PT to address post operative pain, weakness, and decreased mobility to allow pt to return to PLOF and work.       Previous Short Term Goals Status:   1 goal met   New Short Term Goals Status:   1 goal met  Long Term Goal Status:   continue per initial plan of care.  Reasons for Recertification of Therapy:   Maximum outcomes not yet achieved     Plan     Updated Certification Period: 6/6/2022 to 8/29/22  Recommended Treatment Plan: 3 times per week for 12 weeks: Gait Training, Manual Therapy, Moist Heat/ Ice, Neuromuscular Re-ed, Patient Education, Self Care, Therapeutic Activities and Therapeutic Exercise      TISHA SPARROW, PT  6/6/2022      I CERTIFY THE NEED FOR THESE SERVICES FURNISHED UNDER THIS PLAN OF TREATMENT AND WHILE UNDER MY CARE    Physician's comments:        Physician's Signature: ___________________________________________________

## 2022-06-06 NOTE — PROGRESS NOTES
[employer] Physical Therapy Daily Treatment Note     Name: Albin Barfield  Clinic Number: [id number]    Therapy Diagnosis:   Encounter Diagnoses   Name Primary?    Left foot pain Yes    Surgical aftercare, musculoskeletal system      Physician: Mitali Hager MD    Visit Date: 6/6/2022    Physician Orders: PT Eval and Treat   Comments:   S/p Left ankle arthroscopy, debridement and microfracture  Left ankle brostrom with artelon reconstuction  Intraoperative use of fluoroscopy <1hr by Dr. Hager on 3-16-22.  PT: Start date: 4 weeks postop  Rx/protocol and restrictions:   --Gait training, edema control/desensitization modalities  --ADAT WBAT LLE, immobilization: boot; may initiate boot weaning 10 weeks postop   --AROM/AAROM and gentle strengthening/theraband work, but no PROM/manipulation until 10 weeks postop     Medical Diagnosis from Referral:   G57.32 (ICD-10-CM) - Peroneal neuritis, left   M93.272 (ICD-10-CM) - Osteochondritis dissecans of ankle, left   S93.402S (ICD-10-CM) - Inversion sprain of ankle, left, sequela      Evaluation Date: 4/18/2022  Authorization Period Expiration: 3/31/2023  Plan of Care Expiration: updated to 8/29/22  Progress Note Due: 7/4/2022  Visit # / Visits authorized: 12/12, 4/12  FOTO: next at 24th visit  PTA: 3/5  (# of No Show Appts 0/Number of Cancelled Appts 0)    Time In: 12:30 pm  Time Out: 1:25 pm  Total Appointment Time (timed & untimed codes): 55 minutes (4 TE)    Precautions: Standard and chronic pain, Sx precautions    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 Pain is worse in both areas with weightbearing. He sees Dr. Hager this week.   Response to previous treatment: no adverse effects  Functional  "change: Able to put more weight through the L LE than when initiated PT    Pain: 6-7/10, 3/10 LBP, 4-5/10 R calf   Location: dosum of foot as well anterior and lateral ankle and toes, low back, R calf    OBJECTIVE     Ankle AROM Left (*) = in degrees   Dorsiflexion -9   Plantarflexion  20   Inversion 10   Eversion 5      Ankle MMT NT due to severe TTP    Lumbar spine special tests: Positive SLR and Slump on L   Lumbar flexion in both standing and sitting ([facility] vs [facility]) increases L foot s/s!      TREATMENT     Albin received the treatments listed below:      therapeutic exercises (bold exercises performed) to develop strength, endurance, ROM and flexibility for 55 minutes including activities below and screening above:    Seated sciatic nn glides/LAQ to s/s, not through s/s 2x10   Prone on elbows x 3'  Prone press up 2x10 3 sec hold  Supine PIR stretch 4x30" L LE contralateral LE in full supine  Gastroc/soleus stretches in sitting 3x20 sec ea side  Supine bridging (to tolerance ) 2x10   Prone SLR 2x10  Prone HS curls 2x15   Seated DF/PF with 1/2 foam roll (assist from R LE) 2x10  Seated inv/ev with slide board and pillowcase 10x ea direction     Standing L LE Hip abduction with boot 2x15  Standing L LE hip extension with boot 2x15  Lateral weight shifting in standing with boot (as tolerated): 20x   Fwd/Bkw weight shifting in standing with boot as tolereated: 20x  L ankle place ups to 4" step with fwd weight shift: 20x    R LE lift offs to SLS tolerance with HHA PRN: x1'  R LE place ups to 6" step x10     manual therapy techniques  for 0 minutes, including: to begin at 10 weeks post-op on 5/25/2022.     PATIENT EDUCATION AND HOME EXERCISES      Home Exercises Provided and Patient Education Provided     Education provided: Pt was educated on all therapeutic exercises initiated during today's tx visit.     Written Home Exercises Provided: Patient instructed to cont prior HEP. Exercises were reviewed and Albin was " "able to demonstrate them prior to the end of the session.  Albin demonstrated good  understanding of the education provided. See EMR under Patient Instructions for exercises provided during therapy sessions    ASSESSMENT   Albin is a 41 y.o. male referred to outpatient Physical Therapy with a medical diagnosis of Peroneal neuritis, left; Osteochondritis dissecans of ankle, left; Inversion sprain of ankle, left, sequela.   Mr. 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 He then had skilled PT intervention with no resolution of s/s and subsequently had ATSx on 3/16/2022. Procedure: Left ankle arthroscopy, debridement and microfracture, Left ankle brostrom with artelon reconstuction, Intraoperative use of fluoroscopy <1hr.       Currently protocol as follows:   --Gait training, edema control/desensitization modalities   --ADAT WBAT LLE, immobilization: boot; may initiate boot weaning 10 weeks postop   --AROM/AAROM and gentle strengthening/theraband work, but no PROM/manipulation until 10 weeks postop (5/25/2022)    Pt continues to report to session NWB L LE with scooter. Pt does present with boot which was utilized during WB activities. Pt demonstrated a picture of his wound in his lower L leg. The wound appears to be infected. Pt was instructed to send a message through [facility] to his physician, Mitali Hager. Pt continues to report increase in L foot/ankle pain, specifically about the great toe, when bending/forward flexing the lumbar spine (I.e. when bending down to grab his boot to don).Pt also stated that decreased trunk flexion is required to provoke nerve symptoms when compared to previous week. He also continues to present with ongoing severe L ankle/foot "nerve" pain upon arrival with ongoing significant hypersensitivity in dorsum of foot (to light touch, air blowing on the area, etc). He does have a new " referral placed by his provider for lumbar spine imaging - awaiting approval at this time.   Despite ongoing moderate to severe s/s, pt did tolerate a mild increase in both open and closed chain treatment activities as indicated above. Utilized with WB activities focusing on improving WB status statically and dynamically. Planning to initiate B axillary crutch training as he is able to tolerate. Progress pt as tolerated.     Important to note the pt has 2 additional approved PT sessions. Will reach out to referring MD to request a new referral to be placed.     Currently: Pt presents for the 15th of 24th visits. It was noted that his distal LLE wound appears to improving as it continues to heel from the outside in. Continued to focus on extension based exercises and sciatic nerve glides in hopes of reducing distal symptoms. Flexion based exercises as well as WB in boot increased nerve pain into toes. Pt would benefit from continuing skilled PT to improve post op pain, ROM, and strength.     Pt is almost 12 week s/p ( DOS 3/16/2022) Left ankle arthroscopy, debridement and microfracture, Left ankle brostrom with artelon reconstuction, Intraoperative use of fluoroscopy <1hr. Re-assessment/Updated POC performed today and pt has only met HEP goal #1 and no other goals this date. Pt has improved his ankle DF ROM since initial evaluation but ankle MMT not formally tested due to pain. His rehab process has been complicated by severe pain and  Hypersensitivity to light touch in L LE. Lumbar spine special testing reveals positive slump and SLR on L. Pt is awaiting scheduling for lumbar MRI. He also is dealing with poor wound healing and Dr. Hager is monitoring his wound closure. We slowly progressed ankle ROM today in sitting but held WB progressions due to high pain levels with NWB ankle ROM. Pt is cleared per protocol to wean out of boot but unable to tolerate at this point. Pt is compliant with the rehab process but  overall progress has been limited due to pain levels. Pt will see Dr. Hager for follow up this week. Continues to remain appropriate for skilled PT to address post operative pain, weakness, and decreased mobility to allow pt to return to PLOF and work.       The patient's current job specific task deficits include the following:  L ankle pain, hypersensitivity to light touch, marked limited L ankle/foot AROM in all planes of motion, weakness and atrophy noted which has led to functional deficits including inability to ambulate at this time and therefore do any closed chain activity at this time.     Albin Is progressing well towards his goals.   Pt prognosis is Good.     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    Pt's spiritual, cultural and educational needs considered and pt agreeable to plan of care and goals.     Anticipated barriers to physical therapy: acuity of current injury, chronicity of current injury and current post-op status    Goals:     HEP:    - Pt will become compliant and independent with his initial HEP in 2 weeks to promote decreased pain and improved mobility and strength. MET  - Pt will become compliant and independent with an updated, progressed HEP in 12 weeks to promote decreased pain and improved mobility and strength as well as prep for discharge from further PT intervention. In progress   Pain:   NOT MET   - Pt to report decrease in pain levels from 10/10 at worse to 8/10 at worse in 2 weeks.  - Pt to report decrease in pain levels from 10/10 at worse to 6/10 at worse in 4 weeks.  - Pt to report decrease in pain levels from 10/10 at worse to 4/10 at worse in 8 weeks.  Impairment level:  NOT MET   - Pt to increase DF ROM to  neutral (unable to achieve neutral on eval) by week 2 in order to promote improved ROM and ability to WBAT in boot.   - Pt to increase DF ROM to 10* by week 12 in order to promote return to function with ability to ambulate, squat, and perform stair negotiation.   - MMT goals will be set when appropriately able to assess; not indicated at this time.   Function level:  In progress   - Pt will improve his FOTO score from 85% impairment to 50% improvement to indicate improvement in overall function in 12 weeks.   - Pt will be able to place approx 50% weight through the LLE with boot in place in 2 weeks.   - Pt will be able to place approx 75% weight through the LLE with boot in place in 3 weeks.   - Pt will be able to ambulate with boot, WBAT in 4 weeks with AD as needed to promote energy conservation and community ambulation.   - Pt will be able to transition from boot to WBAT by week 10 post-op.      - RTW goals will be set at future visits when appropriately able to assess baseline measures.      Pt will return to work full duty, full time.    Plan   Updated Plan of care Certification: 6/6/22 to 8/29/22     Outpatient Physical Therapy 2 times weekly for 12 weeks to include the following interventions: Gait Training, Manual Therapy, Moist Heat/ Ice, Neuromuscular Re-ed, Patient Education, Self Care, Therapeutic Activities and Therapeutic Exercise.      Upon discharge from further skilled PT intervention it will determined if the need for a work conditioning program or Functional Capacity Evaluation is required to allow the injured worker to return to work with full potential achieved, continued improvement with body mechanics with advanced functional activities, and further prevention of future work-related injuries.       TISHA SPARROW, PT   6/6/2022

## 2022-06-08 ENCOUNTER — CLINICAL SUPPORT (OUTPATIENT)
Dept: REHABILITATION | Facility: HOSPITAL | Age: 42
End: 2022-06-08
Attending: ORTHOPAEDIC SURGERY
Payer: OTHER MISCELLANEOUS

## 2022-06-08 DIAGNOSIS — M79.672 LEFT FOOT PAIN: Primary | ICD-10-CM

## 2022-06-08 DIAGNOSIS — Z47.89 SURGICAL AFTERCARE, MUSCULOSKELETAL SYSTEM: ICD-10-CM

## 2022-06-08 PROCEDURE — 97110 THERAPEUTIC EXERCISES: CPT | Mod: PN,CQ

## 2022-06-08 NOTE — PROGRESS NOTES
[employer] Physical Therapy Daily Treatment Note     Name: Albin Barfield  Clinic Number: [id number]    Therapy Diagnosis:   Encounter Diagnoses   Name Primary?    Left foot pain Yes    Surgical aftercare, musculoskeletal system      Physician: Mitali Hager MD    Visit Date: 6/8/2022    Physician Orders: PT Eval and Treat   Comments:   S/p Left ankle arthroscopy, debridement and microfracture  Left ankle brostrom with artelon reconstuction  Intraoperative use of fluoroscopy <1hr by Dr. Hager on 3-16-22.  PT: Start date: 4 weeks postop  Rx/protocol and restrictions:   --Gait training, edema control/desensitization modalities  --ADAT WBAT LLE, immobilization: boot; may initiate boot weaning 10 weeks postop   --AROM/AAROM and gentle strengthening/theraband work, but no PROM/manipulation until 10 weeks postop     Medical Diagnosis from Referral:   G57.32 (ICD-10-CM) - Peroneal neuritis, left   M93.272 (ICD-10-CM) - Osteochondritis dissecans of ankle, left   S93.402S (ICD-10-CM) - Inversion sprain of ankle, left, sequela      Evaluation Date: 4/18/2022  Authorization Period Expiration: 3/31/2023  Plan of Care Expiration: updated to 8/29/22  Progress Note Due: 7/4/2022  Visit # / Visits authorized: 12/12, 5/12  FOTO: next at 24th visit  PTA: 1/5  (# of No Show Appts 0/Number of Cancelled Appts 0)    Time In: 12:45 pm  Time Out: 1:30 pm  Total Appointment Time (timed & untimed codes): 45 minutes (3 TE)    Precautions: Standard and chronic pain, Sx precautions    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  "He sees Dr. Hager this week.   Response to previous treatment: no adverse effects  Functional change: Able to put more weight through the L LE than when initiated PT    Pain: 5/10, 3-5/10 LBP, 3-4/10 R calf   Location: dosum of foot as well anterior and lateral ankle and toes, low back, R calf    OBJECTIVE     Objective Measures updated at progress report unless specified.     TREATMENT     Albin received the treatments listed below:      therapeutic exercises (bold exercises performed) to develop strength, endurance, ROM and flexibility for 45 minutes including activities below and screening above:    Seated sciatic nn glides/LAQ to s/s, not through s/s 2x10   Prone on elbows x 3'  Prone press up 2x10 3 sec hold  Supine PIR stretch 4x30" L LE contralateral LE in full supine    Gastroc/soleus stretches in sitting 3x20 sec ea side    Supine bridging (to tolerance ) 2x10   Prone SLR 2x10  Prone HS curls 2x15     Seated DF/PF with 1/2 foam roll (assist from R LE) 2x10  Seated inv/ev with slide board and pillowcase 10x ea direction     Standing L LE Hip abduction with boot 2x15  Standing L LE hip extension with boot 2x15    Lateral weight shifting in standing with boot (as tolerated): 20x   Fwd/Bkw weight shifting in standing with boot as tolereated: 20x  L ankle place ups to 4" step with fwd weight shift: 20x    R LE lift offs to SLS tolerance with HHA PRN: x1'  R LE place ups to 6" step x10     manual therapy techniques  for 0 minutes, including: to begin at 10 weeks post-op on 5/25/2022.     PATIENT EDUCATION AND HOME EXERCISES      Home Exercises Provided and Patient Education Provided     Education provided: Pt was educated on all therapeutic exercises initiated during today's tx visit.     Written Home Exercises Provided: Patient instructed to cont prior HEP. Exercises were reviewed and Albin was able to demonstrate them prior to the end of the session.  Albin demonstrated good  understanding of the " "education provided. See EMR under Patient Instructions for exercises provided during therapy sessions    ASSESSMENT   Albin is a 41 y.o. male referred to outpatient Physical Therapy with a medical diagnosis of Peroneal neuritis, left; Osteochondritis dissecans of ankle, left; Inversion sprain of ankle, left, sequela.   Mr. 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 He then had skilled PT intervention with no resolution of s/s and subsequently had ATSx on 3/16/2022. Procedure: Left ankle arthroscopy, debridement and microfracture, Left ankle brostrom with artelon reconstuction, Intraoperative use of fluoroscopy <1hr.       Currently protocol as follows:   --Gait training, edema control/desensitization modalities   --ADAT WBAT LLE, immobilization: boot; may initiate boot weaning 10 weeks postop   --AROM/AAROM and gentle strengthening/theraband work, but no PROM/manipulation until 10 weeks postop (5/25/2022)    Pt continues to report to session NWB L LE with scooter. Pt does present with boot which was utilized during WB activities. Pt demonstrated a picture of his wound in his lower L leg. The wound appears to be infected. Pt was instructed to send a message through [facility] to his physician, Mitali Hager. Pt continues to report increase in L foot/ankle pain, specifically about the great toe, when bending/forward flexing the lumbar spine (I.e. when bending down to grab his boot to don).Pt also stated that decreased trunk flexion is required to provoke nerve symptoms when compared to previous week. He also continues to present with ongoing severe L ankle/foot "nerve" pain upon arrival with ongoing significant hypersensitivity in dorsum of foot (to light touch, air blowing on the area, etc). He does have a new referral placed by his provider for lumbar spine imaging - awaiting approval at this time.   Despite ongoing " moderate to severe s/s, pt did tolerate a mild increase in both open and closed chain treatment activities as indicated above. Utilized with WB activities focusing on improving WB status statically and dynamically. Planning to initiate B axillary crutch training as he is able to tolerate. Progress pt as tolerated.     Important to note the pt has 2 additional approved PT sessions. Will reach out to referring MD to request a new referral to be placed.     Currently: Pt presents for the 15th of 24th visits. It was noted that his distal LLE wound appears to improving as it continues to heel from the outside in. Continued to focus on extension based exercises and sciatic nerve glides in hopes of reducing distal symptoms. Flexion based exercises as well as WB in boot increased nerve pain into toes. Pt would benefit from continuing skilled PT to improve post op pain, ROM, and strength.     It was noted that pt's tx was limited today as pt had to leave early secondary to a MD appointment. Pt is 12 week s/p ( DOS 3/16/2022) Left ankle arthroscopy, debridement and microfracture, Left ankle brostrom with artelon reconstuction, Intraoperative use of fluoroscopy. His rehab process has been complicated by severe pain and  Hypersensitivity to light touch in L LE. Pt is awaiting scheduling for lumbar MRI. He also is dealing with poor wound healing and Dr. Hager is monitoring his wound closure. Ankle ROM was not progressed to WB due to high pain levels with NWB ankle ROM. Pt is cleared per protocol to wean out of boot but unable to tolerate at this point. Pt is compliant with the rehab process but overall progress has been limited due to pain levels. Pt will see Dr. Hager for follow up this week. It was noted that pt endorsed increase pain for the past few days/nights.     Continues to remain appropriate for skilled PT to address post operative pain, weakness, and decreased mobility to allow pt to return to PLOF and work.       The  patient's current job specific task deficits include the following:  L ankle pain, hypersensitivity to light touch, marked limited L ankle/foot AROM in all planes of motion, weakness and atrophy noted which has led to functional deficits including inability to ambulate at this time and therefore do any closed chain activity at this time.     Albin Is progressing well towards his goals.   Pt prognosis is Good.     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    Pt's spiritual, cultural and educational needs considered and pt agreeable to plan of care and goals.     Anticipated barriers to physical therapy: acuity of current injury, chronicity of current injury and current post-op status    Goals:     HEP:    - Pt will become compliant and independent with his initial HEP in 2 weeks to promote decreased pain and improved mobility and strength. MET  - Pt will become compliant and independent with an updated, progressed HEP in 12 weeks to promote decreased pain and improved mobility and strength as well as prep for discharge from further PT intervention. In progress   Pain:   NOT MET   - Pt to report decrease in pain levels from 10/10 at worse to 8/10 at worse in 2 weeks.  - Pt to report decrease in pain levels from 10/10 at worse to 6/10 at worse in 4 weeks.  - Pt to report decrease in pain levels from 10/10 at worse to 4/10 at worse in 8 weeks.  Impairment level:  NOT MET   - Pt to increase DF ROM to neutral (unable to achieve neutral on eval) by week 2 in order to promote improved ROM and ability to WBAT in boot.   - Pt to increase DF ROM to 10* by week 12 in order to promote return to function with ability to ambulate, squat, and perform stair negotiation.   -  MMT goals will be set when appropriately able to assess; not indicated at this time.   Function level:  In progress   - Pt will improve his FOTO score from 85% impairment to 50% improvement to indicate improvement in overall function in 12 weeks.   - Pt will be able to place approx 50% weight through the LLE with boot in place in 2 weeks.   - Pt will be able to place approx 75% weight through the LLE with boot in place in 3 weeks.   - Pt will be able to ambulate with boot, WBAT in 4 weeks with AD as needed to promote energy conservation and community ambulation.   - Pt will be able to transition from boot to WBAT by week 10 post-op.      - RTW goals will be set at future visits when appropriately able to assess baseline measures.      Pt will return to work full duty, full time.    Plan   Updated Plan of care Certification: 6/6/22 to 8/29/22     Outpatient Physical Therapy 2 times weekly for 12 weeks to include the following interventions: Gait Training, Manual Therapy, Moist Heat/ Ice, Neuromuscular Re-ed, Patient Education, Self Care, Therapeutic Activities and Therapeutic Exercise.      Upon discharge from further skilled PT intervention it will determined if the need for a work conditioning program or Functional Capacity Evaluation is required to allow the injured worker to return to work with full potential achieved, continued improvement with body mechanics with advanced functional activities, and further prevention of future work-related injuries.       Frankie Reyes, PTA   6/8/2022

## 2022-06-09 ENCOUNTER — OFFICE VISIT (OUTPATIENT)
Dept: ORTHOPEDICS | Facility: CLINIC | Age: 42
End: 2022-06-09
Payer: OTHER MISCELLANEOUS

## 2022-06-09 VITALS — BODY MASS INDEX: 27.36 KG/M2 | HEIGHT: 72 IN | WEIGHT: 202 LBS

## 2022-06-09 DIAGNOSIS — G57.32 PERONEAL NEURITIS, LEFT: Primary | ICD-10-CM

## 2022-06-09 DIAGNOSIS — Z98.890 HISTORY OF ANKLE SURGERY: ICD-10-CM

## 2022-06-09 PROCEDURE — 99024 PR POST-OP FOLLOW-UP VISIT: ICD-10-PCS | Mod: S$GLB,,, | Performed by: ORTHOPAEDIC SURGERY

## 2022-06-09 PROCEDURE — 99024 POSTOP FOLLOW-UP VISIT: CPT | Mod: S$GLB,,, | Performed by: ORTHOPAEDIC SURGERY

## 2022-06-09 PROCEDURE — 99999 PR PBB SHADOW E&M-EST. PATIENT-LVL III: ICD-10-PCS | Mod: PBBFAC,,, | Performed by: ORTHOPAEDIC SURGERY

## 2022-06-09 PROCEDURE — 99999 PR PBB SHADOW E&M-EST. PATIENT-LVL III: CPT | Mod: PBBFAC,,, | Performed by: ORTHOPAEDIC SURGERY

## 2022-06-09 NOTE — PROGRESS NOTES
Subjective:   Chief complaint: Follow-up Left Foot     3/16/2022 - Left ankle ats, brostrom - artelon, SPN decompression (Dr. Huertas)    HPI:   Albin Barfield is a 42 y.o. male who presents today for follow-up.  Patient is 12 weeks post op. Pain is 5/10. Superficial swelling localized to dorsal midfoot and lateral lower leg near incision. Physical Therapy with [facility] - Tyler Folse, has been working on ROM and light band work. No increased pain with active dorsiflexion or extension. Increased pain and decreased knee extension at this time.     Seeing pain management and [facility] spine doctor- he got frustrated with delays with work comp.  He has also obtained outside  who is handling all his appointment and referrals now.  MRI spine is pending.    ROS:  Musculoskeletal: per HPI     Objective:   Exam:  There were no vitals filed for this visit.  General: No acute distress, well-appearing  Musculoskeletal: Brostrom incision benign as well as dorsal foot incision.  Lateral incision is improving.    Imaging:  None    Additional testing/results reviewed:  None      Assessment:     No diagnosis found.    Patient is seen for a postop visit (<90 days postop) with treatment complicated by work comp, neuritis, back pain/possible disc herniation.    Data: none    Treatment plan: Risk of morbidity from treatment plan secondary to above    From my standpoint he is ready to progress but obviously has multiple confounding factors.  At this point, discussed transitioning care to spine and pain management once MRI obtained as I think his ongoing issues more stem from that rather than his ankle as he is making great progress with his ankle stability but continues to have nerve issues.     Plan:       Today, you saw Dr. Hager and were aftercare following surgery    We decided the next step(s) in in your post-surgical care is/are   Weight bearing: Weight bearing as tolerated left leg   Immobilization: Discontinue CAM Boot  today   Therapy: Continue PT   Blood clot prevention: Ok to discontinue aspirin, unless this was already prescribed by PCP   Wound care: continue medi-honey until Llower leg incision and midfoot incision are scabbed over and healed.    Showering: Ok to shower, but do not submerge in tub/water until scabs resolved.   Pain meds: Continue pain medication - will continue to refill until pain management transition. Please have Dr. COREY Woods of [facility] communicate with our clinic.   Bone health: Recommend balanced diet   Driving: until off narcotic pain medications and until fully weight bearing on operative extremity.  Ultimately the decision about when to drive is a personal choice based on your assessment of your own ability/safety for operating a motor vehicle.   Follow-up: Recheck in PRN, no xrays needed    No change in work status/duty - continue out of work at this time.     Thank you for allowing me to participate in your care.        No orders of the defined types were placed in this encounter.      Past Medical History:   Diagnosis Date    Anxiety     Diverticulosis 2017    Per outside Colonoscopy in Care Everywhere    Internal hemorrhoids 2017    per outside colonoscopy       Past Surgical History:   Procedure Laterality Date    ARTHROSCOPY OF ANKLE Left 3/16/2022    Procedure: ARTHROSCOPY, ANKLE;  Surgeon: Mitali Hager MD;  Location: [facility] [place] OR;  Service: Orthopedics;  Laterality: Left;  Single shot Post op - ask Dr. Huertas      COLONOSCOPY  2017    DECOMPRESSION OF NERVE Left 3/16/2022    Procedure: DECOMPRESSION, NERVE;  Surgeon: Juan M Huertas DPM;  Location: [facility] [place] OR;  Service: Podiatry;  Laterality: Left;    REPAIR OF LIGAMENT OF ANKLE Left 3/16/2022    Procedure: REPAIR, LIGAMENT, ANKLE;  Surgeon: Mitali Hager MD;  Location: [facility] [place] OR;  Service: Orthopedics;  Laterality: Left;    SKIN CANCER EXCISION Right     taken out at age 10    SKIN GRAFT      age 10 - skin cancer removal        Family History   Problem Relation Age of Onset    No Known Problems Father     No Known Problems Brother        Social History     Socioeconomic History    Marital status: Single   Tobacco Use    Smoking status: Former Smoker     Years: 20.00     Types: Cigarettes     Quit date: 2022     Years since quittin.3    Smokeless tobacco: Current User   Substance and Sexual Activity    Alcohol use: Yes     Comment: social     Drug use: Yes     Frequency: 7.0 times per week     Types: Marijuana     Comment: Every day    Sexual activity: Yes     Partners: Female   Social History Narrative    ** Merged History Encounter **

## 2022-06-09 NOTE — PATIENT INSTRUCTIONS
Today, you saw Dr. Hager and were aftercare following surgery    We decided the next step(s) in in your post-surgical care is/are  Weight bearing: Weight bearing as tolerated left leg  Immobilization: Discontinue CAM Boot today  Therapy: Continue PT  Blood clot prevention: Ok to discontinue aspirin, unless this was already prescribed by PCP  Wound care:  continue medi-honey until Llower leg incision and midfoot incision are scabbed over and healed.   Showering: Ok to shower, but do not submerge in tub/water until scabs resolved.  Pain meds: Continue pain medication - will continue to refill until pain management transition. Please have Dr. COREY Woods of [facility] communicate with our clinic.  Bone health: Recommend balanced diet  Driving: until off narcotic pain medications and until fully weight bearing on operative extremity.  Ultimately the decision about when to drive is a personal choice based on your assessment of your own ability/safety for operating a motor vehicle.  Follow-up: Recheck in PRN, no xrays needed    No change in work status/duty - continue out of work at this time.     Thank you for allowing me to participate in your care.

## 2022-06-10 ENCOUNTER — PATIENT MESSAGE (OUTPATIENT)
Dept: ORTHOPEDICS | Facility: CLINIC | Age: 42
End: 2022-06-10
Payer: OTHER MISCELLANEOUS

## 2022-06-10 DIAGNOSIS — G89.18 POST-OP PAIN: ICD-10-CM

## 2022-06-10 RX ORDER — OXYCODONE HYDROCHLORIDE 5 MG/1
5 TABLET ORAL EVERY 12 HOURS PRN
Qty: 14 TABLET | Refills: 0 | Status: CANCELLED | OUTPATIENT
Start: 2022-06-10 | End: 2022-06-17

## 2022-06-13 ENCOUNTER — CLINICAL SUPPORT (OUTPATIENT)
Dept: REHABILITATION | Facility: HOSPITAL | Age: 42
End: 2022-06-13
Attending: ORTHOPAEDIC SURGERY
Payer: OTHER MISCELLANEOUS

## 2022-06-13 DIAGNOSIS — Z47.89 SURGICAL AFTERCARE, MUSCULOSKELETAL SYSTEM: ICD-10-CM

## 2022-06-13 DIAGNOSIS — M79.672 LEFT FOOT PAIN: Primary | ICD-10-CM

## 2022-06-13 PROCEDURE — 97110 THERAPEUTIC EXERCISES: CPT | Mod: PN,CQ

## 2022-06-13 NOTE — PROGRESS NOTES
Workers' Compensation Physical Therapy Daily Treatment Note     Name: Albin Barfield  Clinic Number: [id number]    Therapy Diagnosis:   Encounter Diagnoses   Name Primary?    Left foot pain Yes    Surgical aftercare, musculoskeletal system      Physician: Mitali Hager MD    Visit Date: 6/13/2022    Physician Orders: PT Eval and Treat   Comments:   S/p Left ankle arthroscopy, debridement and microfracture  Left ankle brostrom with artelon reconstuction  Intraoperative use of fluoroscopy <1hr by Dr. Hager on 3-16-22.  PT: Start date: 4 weeks postop  Rx/protocol and restrictions:   --Gait training, edema control/desensitization modalities  --ADAT WBAT LLE, immobilization: boot; may initiate boot weaning 10 weeks postop   --AROM/AAROM and gentle strengthening/theraband work, but no PROM/manipulation until 10 weeks postop     Medical Diagnosis from Referral:   G57.32 (ICD-10-CM) - Peroneal neuritis, left   M93.272 (ICD-10-CM) - Osteochondritis dissecans of ankle, left   S93.402S (ICD-10-CM) - Inversion sprain of ankle, left, sequela      Evaluation Date: 4/18/2022  Authorization Period Expiration: 3/31/2023  Plan of Care Expiration: updated to 8/29/22  Progress Note Due: 7/4/2022  Visit # / Visits authorized: 12/12, 6/12  FOTO: next at 24th visit  PTA: 2/5  (# of No Show Appts 0/Number of Cancelled Appts 0)    Time In: 1:50 pm  Time Out: 2:45 pm  Total Appointment Time (timed & untimed codes): 55 minutes (3 TE)    Precautions: Standard and chronic pain, Sx precautions    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 Pt reports that his LLE radicular pain has increased since then. Pt also reports that his LLE is not as numb as it was before but the  "radicular pain has increased. Pt further states that his LLE knee extension in sitting has decreased due to radicular symptoms. Pt noted that Dr. Hager discharged him last week.   Response to previous treatment: no adverse effects  Functional change: Able to put more weight through the L LE than when initiated PT    Pain: 5-8/10, 1-2/10 LBP, 3/10 R calf   Location: dosum of foot as well anterior and lateral ankle , posterior & lateral distal LE and toes, low back, R calf    OBJECTIVE     Objective Measures updated at progress report unless specified.     TREATMENT     Albin received the treatments listed below:      therapeutic exercises (bold exercises performed) to develop strength, endurance, ROM and flexibility for 55 minutes including activities below and screening above:    Seated sciatic nn glides/LAQ to s/s, not through s/s 2x10   Prone on elbows x 3'  Prone press up 2x10 3 sec hold   Supine PIR stretch 3x30" L LE contralateral LE in full supine  Gastroc stretches in sitting 2x30 sec/RLE only  Soleus stretches in sitting 2x30 sec ea side    Supine bridging (to tolerance ) 2x10   Prone SLR 2x10  Prone HS curls 2x15     Seated DF/PF with 1/2 foam roll (assist from R LE) 2x10  Seated inv/ev with slide board and pillowcase 10x ea direction     Standing L LE Hip abduction with boot 2x15  Standing L LE hip extension with boot 2x15    Lateral weight shifting in standing without boot (as tolerated): 20x   Fwd/Bkw weight shifting in standing without as tolereated: 20x  L ankle place ups to 4" step with fwd weight shift: 20x    R LE lift offs to SLS tolerance with HHA PRN: x1'  R LE place ups to 6" step x10     manual therapy techniques  for 0 minutes, including: to begin at 10 weeks post-op on 5/25/2022.     PATIENT EDUCATION AND HOME EXERCISES      Home Exercises Provided and Patient Education Provided     Education provided: Pt was educated on all therapeutic exercises initiated during today's tx visit. " "    Written Home Exercises Provided: Patient instructed to cont prior HEP. Exercises were reviewed and Albin was able to demonstrate them prior to the end of the session.  Albin demonstrated good  understanding of the education provided. See EMR under Patient Instructions for exercises provided during therapy sessions    ASSESSMENT   Albin is a 41 y.o. male referred to outpatient Physical Therapy with a medical diagnosis of Peroneal neuritis, left; Osteochondritis dissecans of ankle, left; Inversion sprain of ankle, left, sequela.   Mr. 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 He then had skilled PT intervention with no resolution of s/s and subsequently had ATSx on 3/16/2022. Procedure: Left ankle arthroscopy, debridement and microfracture, Left ankle brostrom with artelon reconstuction, Intraoperative use of fluoroscopy <1hr.       Currently protocol as follows:   --Gait training, edema control/desensitization modalities   --ADAT WBAT LLE, immobilization: boot; may initiate boot weaning 10 weeks postop   --AROM/AAROM and gentle strengthening/theraband work, but no PROM/manipulation until 10 weeks postop (5/25/2022)    Pt continues to report to session NWB L LE with scooter. Pt does present with boot which was utilized during WB activities. Pt demonstrated a picture of his wound in his lower L leg. The wound appears to be infected. Pt was instructed to send a message through [facility] to his physician, Mitali Hager. Pt continues to report increase in L foot/ankle pain, specifically about the great toe, when bending/forward flexing the lumbar spine (I.e. when bending down to grab his boot to don).Pt also stated that decreased trunk flexion is required to provoke nerve symptoms when compared to previous week. He also continues to present with ongoing severe L ankle/foot "nerve" pain upon arrival with ongoing " significant hypersensitivity in dorsum of foot (to light touch, air blowing on the area, etc). He does have a new referral placed by his provider for lumbar spine imaging - awaiting approval at this time.   Despite ongoing moderate to severe s/s, pt did tolerate a mild increase in both open and closed chain treatment activities as indicated above. Utilized with WB activities focusing on improving WB status statically and dynamically. Planning to initiate B axillary crutch training as he is able to tolerate. Progress pt as tolerated.     Important to note the pt has 2 additional approved PT sessions. Will reach out to referring MD to request a new referral to be placed.     Currently: Pt presents for the 15th of 24th visits. It was noted that his distal LLE wound appears to improving as it continues to heel from the outside in. Continued to focus on extension based exercises and sciatic nerve glides in hopes of reducing distal symptoms. Flexion based exercises as well as WB in boot increased nerve pain into toes. Pt would benefit from continuing skilled PT to improve post op pain, ROM, and strength.     Pt presented with increased sensation in distal L LE as pt reported decreased numbness but increased pain. Pt also stated that he visited Dr. Hager late last week and was discharged from her care at that time. MD instructed pt to discontinue using the boot and walk perform ADLs as tolerated. Pt also noted that pt an increase in foot as well as distal LLE pain over the past few days. Lastly, pt noted that he took his last dose of pain medication 4 days ago. Pt tolerated tx fairly as increased L foot/LE pain limited his theapeutic exercises. It was noted that pt reported an increase in pain in toes when performing prone press ups. Pt's LLE wound appears to be healing but healing very slowly.      Pt is 12 week s/p ( DOS 3/16/2022) Left ankle arthroscopy, debridement and microfracture, Left ankle brostrom with artelon  reconstuction, Intraoperative use of fluoroscopy. His rehab process has been complicated by severe pain and  Hypersensitivity to light touch in L LE. Pt is awaiting scheduling for lumbar MRI.  Ankle ROM was not progressed to WB due to high pain levels with NWB ankle ROM. Pt is compliant with the rehab process but overall progress has been limited due to pain levels. Continues to remain appropriate for skilled PT to address post operative pain, weakness, and decreased mobility to allow pt to return to PLOF and work.     The patient's current job specific task deficits include the following:  L ankle pain, hypersensitivity to light touch, marked limited L ankle/foot AROM in all planes of motion, weakness and atrophy noted which has led to functional deficits including inability to ambulate at this time and therefore do any closed chain activity at this time.     Albin Is progressing well towards his goals.   Pt prognosis is Good.     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    Pt's spiritual, cultural and educational needs considered and pt agreeable to plan of care and goals.     Anticipated barriers to physical therapy: acuity of current injury, chronicity of current injury and current post-op status    Goals:     HEP:    - Pt will become compliant and independent with his initial HEP in 2 weeks to promote decreased pain and improved mobility and strength. MET  - Pt will become compliant and independent with an updated, progressed HEP in 12 weeks to promote decreased pain and improved mobility and strength as well as prep for discharge from further PT intervention. In progress   Pain:   NOT MET   - Pt to report decrease in pain levels from 10/10 at worse to  8/10 at worse in 2 weeks.  - Pt to report decrease in pain levels from 10/10 at worse to 6/10 at worse in 4 weeks.  - Pt to report decrease in pain levels from 10/10 at worse to 4/10 at worse in 8 weeks.  Impairment level:  NOT MET   - Pt to increase DF ROM to neutral (unable to achieve neutral on eval) by week 2 in order to promote improved ROM and ability to WBAT in boot.   - Pt to increase DF ROM to 10* by week 12 in order to promote return to function with ability to ambulate, squat, and perform stair negotiation.   - MMT goals will be set when appropriately able to assess; not indicated at this time.   Function level:  In progress   - Pt will improve his FOTO score from 85% impairment to 50% improvement to indicate improvement in overall function in 12 weeks.   - Pt will be able to place approx 50% weight through the LLE with boot in place in 2 weeks.   - Pt will be able to place approx 75% weight through the LLE with boot in place in 3 weeks.   - Pt will be able to ambulate with boot, WBAT in 4 weeks with AD as needed to promote energy conservation and community ambulation.   - Pt will be able to transition from boot to WBAT by week 10 post-op.      - RTW goals will be set at future visits when appropriately able to assess baseline measures.      Pt will return to work full duty, full time.    Plan   Updated Plan of care Certification: 6/6/22 to 8/29/22     Outpatient Physical Therapy 2 times weekly for 12 weeks to include the following interventions: Gait Training, Manual Therapy, Moist Heat/ Ice, Neuromuscular Re-ed, Patient Education, Self Care, Therapeutic Activities and Therapeutic Exercise.      Upon discharge from further skilled PT intervention it will determined if the need for a work conditioning program or Functional Capacity Evaluation is required to allow the injured worker to return to work with full potential achieved, continued improvement with body mechanics with advanced functional  activities, and further prevention of future work-related injuries.       Frankie Reyes, PTA   6/13/2022

## 2022-06-14 RX ORDER — OXYCODONE HYDROCHLORIDE 5 MG/1
5 TABLET ORAL EVERY 12 HOURS PRN
Qty: 14 TABLET | Refills: 0 | Status: SHIPPED | OUTPATIENT
Start: 2022-06-14 | End: 2022-06-21

## 2022-06-14 NOTE — TELEPHONE ENCOUNTER
reviewed and appropriate.  As we have discussed- patient is in process of getting referral to pain management.  Once established, expectation is that they will take-over.  Will continue at current BID dosing along with multimodal management.

## 2022-06-15 ENCOUNTER — CLINICAL SUPPORT (OUTPATIENT)
Dept: REHABILITATION | Facility: HOSPITAL | Age: 42
End: 2022-06-15
Attending: ORTHOPAEDIC SURGERY
Payer: OTHER MISCELLANEOUS

## 2022-06-15 DIAGNOSIS — Z47.89 SURGICAL AFTERCARE, MUSCULOSKELETAL SYSTEM: ICD-10-CM

## 2022-06-15 DIAGNOSIS — M79.672 LEFT FOOT PAIN: Primary | ICD-10-CM

## 2022-06-15 PROCEDURE — 97110 THERAPEUTIC EXERCISES: CPT | Mod: PN,CQ

## 2022-06-15 NOTE — PROGRESS NOTES
Work[employer] Physical Therapy Daily Treatment Note     Name: Albin Barfield  Clinic Number: [id number]    Therapy Diagnosis:   No diagnosis found.  Physician: Mitali Hager MD    Visit Date: 6/15/2022    Physician Orders: PT Eval and Treat   Comments:  S/p Left ankle arthroscopy, debridement and microfracture  Left ankle brostrom with artelon reconstuction  Intraoperative use of fluoroscopy <1hr by Dr. Hager on 3-16-22.  PT: Start date: 4 weeks postop  Rx/protocol and restrictions:   --Gait training, edema control/desensitization modalities  --ADAT WBAT LLE, immobilization: boot; may initiate boot weaning 10 weeks postop   --AROM/AAROM and gentle strengthening/theraband work, but no PROM/manipulation until 10 weeks postop     Medical Diagnosis from Referral:   G57.32 (ICD-10-CM) - Peroneal neuritis, left   M93.272 (ICD-10-CM) - Osteochondritis dissecans of ankle, left   S93.402S (ICD-10-CM) - Inversion sprain of ankle, left, sequela      Evaluation Date: 4/18/2022  Authorization Period Expiration: 3/31/2023  Plan of Care Expiration: updated to 8/29/22  Progress Note Due: 7/4/2022  Visit # / Visits authorized: 12/12, 7/12  FOTO: next at 24th visit  PTA: 3/5  (# of No Show Appts 0/Number of Cancelled Appts 0)    Time In: 1:45 pm  Time Out: 2:45 pm  Total Appointment Time (timed & untimed codes): 60 minutes (4 TE)    Precautions: Standard and chronic pain, Sx precautions    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 Pain management MD informed him that he would give  him an injection in low back. MD also referred him to a wound specialist.  Response to previous treatment: no adverse effects  Functional change: Able to put more  "weight through the L LE than when initiated PT    Pain: 5/10, 1-2/10 LBP, 3/10 R calf   Location: dosum of foot as well anterior and lateral ankle , distal LE and toes, low back, R calf    OBJECTIVE     Objective Measures updated at progress report unless specified.     TREATMENT     Albin received the treatments listed below:      therapeutic exercises (bold exercises performed) to develop strength, endurance, ROM and flexibility for 55 minutes including activities below and screening above:    Seated sciatic nn glides/LAQ to s/s, not through s/s 2x10   Prone on elbows x 3'  Prone press up 2x10 3 sec hold     Supine PIR stretch 3x30" L LE contralateral LE in full supine  Gastroc stretches in sitting 2x30 sec/RLE only  Soleus stretches in sitting 2x30 sec ea side    Supine bridging (to tolerance ) 2x10   Prone SLR 2x10  Prone HS curls 2x15     Seated DF/PF with 1/2 foam roll (assist from R LE) 2x10  Seated inv/ev with slide board and pillowcase 10x ea direction     Standing L LE Hip abduction with boot 2x15  Standing L LE hip extension with boot 2x15    Lateral weight shifting in standing without boot (as tolerated): 20x   Fwd/Bkw weight shifting in standing without as tolereated: 20x  L ankle place ups to 4" step with fwd weight shift: 20x    R LE lift offs to SLS tolerance with HHA PRN: x1'  R LE place ups to 6" step x10     manual therapy techniques  for 5 minutes, including: to begin at 10 weeks post-op on 5/25/2022.   -Manual stretch of L ankle into PF in supine in order to stretch gastroc and desensitize L foot.       PATIENT EDUCATION AND HOME EXERCISES      Home Exercises Provided and Patient Education Provided     Education provided: Pt was educated on all therapeutic exercises initiated during today's tx visit.     Written Home Exercises Provided: Patient instructed to cont prior HEP. Exercises were reviewed and Albin was able to demonstrate them prior to the end of the session.  Albin " "demonstrated good  understanding of the education provided. See EMR under Patient Instructions for exercises provided during therapy sessions    ASSESSMENT   Albin is a 41 y.o. male referred to outpatient Physical Therapy with a medical diagnosis of Peroneal neuritis, left; Osteochondritis dissecans of ankle, left; Inversion sprain of ankle, left, sequela.   Mr. 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 He then had skilled PT intervention with no resolution of s/s and subsequently had ATSx on 3/16/2022. Procedure: Left ankle arthroscopy, debridement and microfracture, Left ankle brostrom with artelon reconstuction, Intraoperative use of fluoroscopy <1hr.       Currently protocol as follows:   --Gait training, edema control/desensitization modalities   --ADAT WBAT LLE, immobilization: boot; may initiate boot weaning 10 weeks postop   --AROM/AAROM and gentle strengthening/theraband work, but no PROM/manipulation until 10 weeks postop (5/25/2022)    Pt continues to report to session NWB L LE with scooter. Pt does present with boot which was utilized during WB activities. Pt demonstrated a picture of his wound in his lower L leg. The wound appears to be infected. Pt was instructed to send a message through My "[employer] to his physician, Mitali Hager. Pt continues to report increase in L foot/ankle pain, specifically about the great toe, when bending/forward flexing the lumbar spine (I.e. when bending down to grab his boot to don).Pt also stated that decreased trunk flexion is required to provoke nerve symptoms when compared to previous week. He also continues to present with ongoing severe L ankle/foot "nerve" pain upon arrival with ongoing significant hypersensitivity in dorsum of foot (to light touch, air blowing on the area, etc). He does have a new referral placed by his provider for lumbar spine imaging - awaiting " approval at this time.   Despite ongoing moderate to severe s/s, pt did tolerate a mild increase in both open and closed chain treatment activities as indicated above. Utilized with WB activities focusing on improving WB status statically and dynamically. Planning to initiate B axillary crutch training as he is able to tolerate. Progress pt as tolerated.     Important to note the pt has 2 additional approved PT sessions. Will reach out to referring MD to request a new referral to be placed.     Currently: Pt presents for the 15th of 24th visits. It was noted that his distal LLE wound appears to improving as it continues to heel from the outside in. Continued to focus on extension based exercises and sciatic nerve glides in hopes of reducing distal symptoms. Flexion based exercises as well as WB in boot increased nerve pain into toes. Pt would benefit from continuing skilled PT to improve post op pain, ROM, and strength.     Pt presented with continued increased sensation in distal L LE as pt reported that he is also experiencing anterior distal LE pain since yesterday. Pt also stated that he visited pain management MD who will seek authorization for an lumbar injection and altered his current pain medication. Pt also noted that MD also referred him to a would specialist secondary to his open wound in his LLE. Manual tx was initiated during today's tx visit in order to stretch gastroc and desensitize L foot. Lastly, it was also noted that pt continued to endorse significant pain/radicular symptoms in plantar aspect of L foot when weightbearing.     Pt is 13 weeks s/p ( DOS 3/16/2022) Left ankle arthroscopy, debridement and microfracture, Left ankle brostrom with artelon reconstuction, Intraoperative use of fluoroscopy. His rehab process has been complicated by severe pain and  Hypersensitivity to light touch in L LE. Pt is awaiting scheduling for lumbar MRI.  Ankle ROM was not progressed to WB due to high pain levels  with NWB ankle ROM. Pt is compliant with the rehab process but overall progress has been limited due to pain levels. Continues to remain appropriate for skilled PT to address post operative pain, weakness, and decreased mobility to allow pt to return to PLOF and work.     The patient's current job specific task deficits include the following:  L ankle pain, hypersensitivity to light touch, marked limited L ankle/foot AROM in all planes of motion, weakness and atrophy noted which has led to functional deficits including inability to ambulate at this time and therefore do any closed chain activity at this time.     Albin Is progressing well towards his goals.   Pt prognosis is Good.     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    Pt's spiritual, cultural and educational needs considered and pt agreeable to plan of care and goals.     Anticipated barriers to physical therapy: acuity of current injury, chronicity of current injury and current post-op status    Goals:     HEP:    - Pt will become compliant and independent with his initial HEP in 2 weeks to promote decreased pain and improved mobility and strength. MET  - Pt will become compliant and independent with an updated, progressed HEP in 12 weeks to promote decreased pain and improved mobility and strength as well as prep for discharge from further PT intervention. In progress   Pain:   NOT MET   - Pt to report decrease in pain levels from 10/10 at worse to 8/10 at worse in 2 weeks.  - Pt to report decrease in pain levels from 10/10 at worse to 6/10 at worse in 4 weeks.  - Pt to report decrease in pain levels from 10/10 at worse to 4/10 at worse in 8 weeks.  Impairment level:  NOT MET   - Pt to increase  DF ROM to neutral (unable to achieve neutral on eval) by week 2 in order to promote improved ROM and ability to WBAT in boot.   - Pt to increase DF ROM to 10* by week 12 in order to promote return to function with ability to ambulate, squat, and perform stair negotiation.   - MMT goals will be set when appropriately able to assess; not indicated at this time.   Function level:  In progress   - Pt will improve his FOTO score from 85% impairment to 50% improvement to indicate improvement in overall function in 12 weeks.   - Pt will be able to place approx 50% weight through the LLE with boot in place in 2 weeks.   - Pt will be able to place approx 75% weight through the LLE with boot in place in 3 weeks.   - Pt will be able to ambulate with boot, WBAT in 4 weeks with AD as needed to promote energy conservation and community ambulation.   - Pt will be able to transition from boot to WBAT by week 10 post-op.      - RTW goals will be set at future visits when appropriately able to assess baseline measures.      Pt will return to work full duty, full time.    Plan   Updated Plan of care Certification: 6/6/22 to 8/29/22     Outpatient Physical Therapy 2 times weekly for 12 weeks to include the following interventions: Gait Training, Manual Therapy, Moist Heat/ Ice, Neuromuscular Re-ed, Patient Education, Self Care, Therapeutic Activities and Therapeutic Exercise.      Upon discharge from further skilled PT intervention it will determined if the need for a work conditioning program or Functional Capacity Evaluation is required to allow the injured worker to return to work with full potential achieved, continued improvement with body mechanics with advanced functional activities, and further prevention of future work-related injuries.       Frankie Reyes, PTA   6/15/2022

## 2022-06-17 ENCOUNTER — CLINICAL SUPPORT (OUTPATIENT)
Dept: REHABILITATION | Facility: HOSPITAL | Age: 42
End: 2022-06-17
Attending: ORTHOPAEDIC SURGERY
Payer: OTHER MISCELLANEOUS

## 2022-06-17 DIAGNOSIS — M79.672 LEFT FOOT PAIN: Primary | ICD-10-CM

## 2022-06-17 DIAGNOSIS — Z47.89 SURGICAL AFTERCARE, MUSCULOSKELETAL SYSTEM: ICD-10-CM

## 2022-06-17 PROCEDURE — 97110 THERAPEUTIC EXERCISES: CPT | Mod: PN,CQ

## 2022-06-17 NOTE — PROGRESS NOTES
Workers' [employer] Physical Therapy Daily Treatment Note     Name: Albin Barfield  Clinic Number: [id number]    Therapy Diagnosis:   No diagnosis found.  Physician: Mitali Hager MD    Visit Date: 6/17/2022    Physician Orders: PT Eval and Treat   Comments:  S/p Left ankle arthroscopy, debridement and microfracture  Left ankle brostrom with artelon reconstuction  Intraoperative use of fluoroscopy <1hr by Dr. Hager on 3-16-22.  PT: Start date: 4 weeks postop  Rx/protocol and restrictions:   --Gait training, edema control/desensitization modalities  --ADAT WBAT LLE, immobilization: boot; may initiate boot weaning 10 weeks postop   --AROM/AAROM and gentle strengthening/theraband work, but no PROM/manipulation until 10 weeks postop     Medical Diagnosis from Referral:   G57.32 (ICD-10-CM) - Peroneal neuritis, left   M93.272 (ICD-10-CM) - Osteochondritis dissecans of ankle, left   S93.402S (ICD-10-CM) - Inversion sprain of ankle, left, sequela      Evaluation Date: 4/18/2022  Authorization Period Expiration: 3/31/2023  Plan of Care Expiration: updated to 8/29/22  Progress Note Due: 7/4/2022  Visit # / Visits authorized: 12/12, 8/12  FOTO: next at 24th visit  PTA: 4/5  (# of No Show Appts 0/Number of Cancelled Appts 0)    Time In: 1:45 pm  Time Out: 2:45 pm  Total Appointment Time (timed & untimed codes): 60 minutes (4 TE)    Precautions: Standard and chronic pain, Sx precautions    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 Pt also states that he continues to feel plantar foot pain when weightbearing on the L foot. Lastly, pt stated that his low back has begun to hurt some but not hurting at the moment.   Response to previous  "treatment: no adverse effects  Functional change: Able to put more weight through the L LE than when initiated PT    Pain: 6/10, 1-2/10 LBP, 3/10 R calf   Location: dosum of foot as well anterior and lateral ankle , distal LE and toes, low back, R calf    OBJECTIVE     Objective Measures updated at progress report unless specified.     TREATMENT     Albin received the treatments listed below:      therapeutic exercises (bold exercises performed) to develop strength, endurance, ROM and flexibility for 55 minutes including activities below and screening above:    Seated sciatic nn glides/LAQ to s/s, not through s/s 2x10   Prone on elbows x 3'  Prone press up 2x10 3 sec hold   Supine PIR stretch 3x30" L LE contralateral LE in full supine  Supine gastroc stretches 2x30 sec/RLE only  Soleus stretches in sitting 2x30 sec ea side  PF with YTB in supine x10  DF with YTB in supine x10 (increased foot pain)    Supine bridging (to tolerance ) 2x10   Prone SLR 2x10  Prone HS curls 2x15     Seated DF/PF with 1/2 foam roll (assist from R LE) 2x10  Seated inv/ev with slide board and pillowcase 10x ea direction     Standing L LE Hip abduction with boot 2x15  Standing L LE hip extension with boot 2x15    Lateral weight shifting in standing without boot (as tolerated): 20x   Fwd/Bkw weight shifting in standing without as tolereated: 20x  L ankle place ups to 4" step with fwd weight shift: 20x    R LE lift offs to SLS tolerance with HHA PRN: x1'  R LE place ups to 6" step x10     manual therapy techniques  for 5 minutes, including: to begin at 10 weeks post-op on 5/25/2022.   -Manual stretch of L ankle into PF in supine in order to stretch gastroc and desensitize L foot.       PATIENT EDUCATION AND HOME EXERCISES      Home Exercises Provided and Patient Education Provided     Education provided: Pt was educated on all therapeutic exercises initiated during today's tx visit.     Written Home Exercises Provided: Patient instructed to " "cont prior HEP. Exercises were reviewed and Albin was able to demonstrate them prior to the end of the session.  Albin demonstrated good  understanding of the education provided. See EMR under Patient Instructions for exercises provided during therapy sessions    ASSESSMENT   Albin is a 41 y.o. male referred to outpatient Physical Therapy with a medical diagnosis of Peroneal neuritis, left; Osteochondritis dissecans of ankle, left; Inversion sprain of ankle, left, sequela.   Mr. 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 He then had skilled PT intervention with no resolution of s/s and subsequently had ATSx on 3/16/2022. Procedure: Left ankle arthroscopy, debridement and microfracture, Left ankle brostrom with artelon reconstuction, Intraoperative use of fluoroscopy <1hr.       Currently protocol as follows:   --Gait training, edema control/desensitization modalities   --ADAT WBAT LLE, immobilization: boot; may initiate boot weaning 10 weeks postop   --AROM/AAROM and gentle strengthening/theraband work, but no PROM/manipulation until 10 weeks postop (5/25/2022)    Pt continues to report to session NWB L LE with scooter. Pt does present with boot which was utilized during WB activities. Pt demonstrated a picture of his wound in his lower L leg. The wound appears to be infected. Pt was instructed to send a message through My [facility] to his physician, Mitali Hager. Pt continues to report increase in L foot/ankle pain, specifically about the great toe, when bending/forward flexing the lumbar spine (I.e. when bending down to grab his boot to don).Pt also stated that decreased trunk flexion is required to provoke nerve symptoms when compared to previous week. He also continues to present with ongoing severe L ankle/foot "nerve" pain upon arrival with ongoing significant hypersensitivity in dorsum of foot (to light " touch, air blowing on the area, etc). He does have a new referral placed by his provider for lumbar spine imaging - awaiting approval at this time.   Despite ongoing moderate to severe s/s, pt did tolerate a mild increase in both open and closed chain treatment activities as indicated above. Utilized with WB activities focusing on improving WB status statically and dynamically. Planning to initiate B axillary crutch training as he is able to tolerate. Progress pt as tolerated.     Important to note the pt has 2 additional approved PT sessions. Will reach out to referring MD to request a new referral to be placed.     Currently: Pt presents for the 15th of 24th visits. It was noted that his distal LLE wound appears to improving as it continues to heel from the outside in. Continued to focus on extension based exercises and sciatic nerve glides in hopes of reducing distal symptoms. Flexion based exercises as well as WB in boot increased nerve pain into toes. Pt would benefit from continuing skilled PT to improve post op pain, ROM, and strength.     Pt endorsed continued increased sensation and pain in distal L LE. Pt is waiting on authorization for a lumbar injection from pain management MD. Pt also noted that MD also referred him to a would specialist secondary to his open wound in his LLE. Manual tx was performed during today's tx visit in order to stretch gastroc and desensitize L foot. Lastly, it was also noted that pt continued to endorse significant pain/radicular symptoms in plantar aspect of L foot when weightbearing.     Pt is 13 weeks and 2 days s/p ( DOS 3/16/2022) Left ankle arthroscopy, debridement and microfracture, Left ankle brostrom with artelon reconstuction, Intraoperative use of fluoroscopy. His rehab process has been complicated by severe pain and  Hypersensitivity to light touch in L LE. Pt is awaiting scheduling for lumbar MRI.  Ankle ROM was not progressed to WB due to high pain levels with NWB  ankle ROM. Pt is compliant with the rehab process but overall progress has been limited due to pain levels. Continues to remain appropriate for skilled PT to address post operative pain, weakness, and decreased mobility to allow pt to return to PLOF and work.     The patient's current job specific task deficits include the following:  L ankle pain, hypersensitivity to light touch, marked limited L ankle/foot AROM in all planes of motion, weakness and atrophy noted which has led to functional deficits including inability to ambulate at this time and therefore do any closed chain activity at this time.     Albin Is progressing well towards his goals.   Pt prognosis is Good.     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    Pt's spiritual, cultural and educational needs considered and pt agreeable to plan of care and goals.     Anticipated barriers to physical therapy: acuity of current injury, chronicity of current injury and current post-op status    Goals:     HEP:    - Pt will become compliant and independent with his initial HEP in 2 weeks to promote decreased pain and improved mobility and strength. MET  - Pt will become compliant and independent with an updated, progressed HEP in 12 weeks to promote decreased pain and improved mobility and strength as well as prep for discharge from further PT intervention. In progress   Pain:   NOT MET   - Pt to report decrease in pain levels from 10/10 at worse to 8/10 at worse in 2 weeks.  - Pt to report decrease in pain levels from 10/10 at worse to 6/10 at worse in 4 weeks.  - Pt to report decrease in pain levels from 10/10 at worse to 4/10 at worse in 8 weeks.  Impairment level:  NOT MET   - Pt to increase DF ROM to  neutral (unable to achieve neutral on eval) by week 2 in order to promote improved ROM and ability to WBAT in boot.   - Pt to increase DF ROM to 10* by week 12 in order to promote return to function with ability to ambulate, squat, and perform stair negotiation.   - MMT goals will be set when appropriately able to assess; not indicated at this time.   Function level:  In progress   - Pt will improve his FOTO score from 85% impairment to 50% improvement to indicate improvement in overall function in 12 weeks.   - Pt will be able to place approx 50% weight through the LLE with boot in place in 2 weeks.   - Pt will be able to place approx 75% weight through the LLE with boot in place in 3 weeks.   - Pt will be able to ambulate with boot, WBAT in 4 weeks with AD as needed to promote energy conservation and community ambulation.   - Pt will be able to transition from boot to WBAT by week 10 post-op.      - RTW goals will be set at future visits when appropriately able to assess baseline measures.      Pt will return to work full duty, full time.    Plan   Updated Plan of care Certification: 6/6/22 to 8/29/22     Outpatient Physical Therapy 2 times weekly for 12 weeks to include the following interventions: Gait Training, Manual Therapy, Moist Heat/ Ice, Neuromuscular Re-ed, Patient Education, Self Care, Therapeutic Activities and Therapeutic Exercise.      Upon discharge from further skilled PT intervention it will determined if the need for a work conditioning program or Functional Capacity Evaluation is required to allow the injured worker to return to work with full potential achieved, continued improvement with body mechanics with advanced functional activities, and further prevention of future work-related injuries.       Frankie Reyes, PTA   6/17/2022

## 2022-06-20 ENCOUNTER — CLINICAL SUPPORT (OUTPATIENT)
Dept: REHABILITATION | Facility: HOSPITAL | Age: 42
End: 2022-06-20
Attending: ORTHOPAEDIC SURGERY
Payer: OTHER MISCELLANEOUS

## 2022-06-20 DIAGNOSIS — Z47.89 SURGICAL AFTERCARE, MUSCULOSKELETAL SYSTEM: ICD-10-CM

## 2022-06-20 DIAGNOSIS — M79.672 LEFT FOOT PAIN: Primary | ICD-10-CM

## 2022-06-20 PROCEDURE — 97110 THERAPEUTIC EXERCISES: CPT | Mod: PN,CQ

## 2022-06-20 NOTE — PROGRESS NOTES
"Work[employer] Physical Therapy Daily Treatment Note     Name: Albin Barfield  Clinic Number: [id number]    Therapy Diagnosis:   No diagnosis found.  Physician: Mitali Hager MD    Visit Date: 6/20/2022    Physician Orders: PT Eval and Treat   Comments:  S/p Left ankle arthroscopy, debridement and microfracture  Left ankle brostrom with artelon reconstuction  Intraoperative use of fluoroscopy <1hr by Dr. Hager on 3-16-22.  PT: Start date: 4 weeks postop  Rx/protocol and restrictions:   --Gait training, edema control/desensitization modalities  --ADAT WBAT LLE, immobilization: boot; may initiate boot weaning 10 weeks postop   --AROM/AAROM and gentle strengthening/theraband work, but no PROM/manipulation until 10 weeks postop     Medical Diagnosis from Referral:   G57.32 (ICD-10-CM) - Peroneal neuritis, left   M93.272 (ICD-10-CM) - Osteochondritis dissecans of ankle, left   S93.402S (ICD-10-CM) - Inversion sprain of ankle, left, sequela      Evaluation Date: 4/18/2022  Authorization Period Expiration: 3/31/2023  Plan of Care Expiration: updated to 8/29/22  Progress Note Due: 7/4/2022  Visit # / Visits authorized: 12/12, 9/12  FOTO: next at 24th visit  PTA: 5/5  (# of No Show Appts 0/Number of Cancelled Appts 0)    Time In: 1:45 pm  Time Out: 2:45 pm  Total Appointment Time (timed & untimed codes): 60 minutes (4 TE)    Precautions: Standard and chronic pain, Sx precautions    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 Pt also reports that he is not able to extend knee in sitting as far due to increase nerve pain in foot.    Response to previous treatment: no adverse effects  Functional " "change: Able to put more weight through the L LE than when initiated PT    Pain: 6/10, 1-2/10 LBP, 3/10 R calf   Location: dosum of foot as well anterior and lateral ankle , distal LE and toes, low back, R calf    OBJECTIVE     Objective Measures updated at progress report unless specified.   Percussion to patella with no increase in s/s  Percussion to the lateral fibular head with increase in distal lower leg s/s approx distal 1/3  Percussion to mid point tibia with mild increase in foot "nerve" s/s  (+) Slump test L LE mid range of motion, biasing ankle as well as lumbar spine  Increased foot "nerve" s/s with lumbar flexion in closed and open chain (sitting)  - obj screening completed by PT Maximino Blake PT, DPT, OCS today    TREATMENT     Albin received the treatments listed below:      therapeutic exercises (bold exercises performed) to develop strength, endurance, ROM and flexibility for 55 minutes including activities below and screening above:    Seated sciatic nn glides/LAQ to s/s, not through s/s 2x10   Prone on elbows x 3'  Prone press up 2x10 3 sec hold   Supine PIR stretch 3x30" L LE contralateral LE in full supine  Supine gastroc stretches 3x30 sec/RLE only  Soleus stretches in sitting 3x30 sec ea side    PF with YTB in supine x15  DF with YTB in supine x15 (increased foot pain)    Supine bridging (to tolerance ) 2x10   Prone SLR 2x10  Prone HS curls 2x10     Seated DF/PF with 1/2 foam roll (assist from R LE) 2x10  Seated inv/ev with slide board and pillowcase 10x ea direction     Standing L LE Hip abduction with boot 2x15  Standing L LE hip extension with boot 2x15    Lateral weight shifting in standing without boot (as tolerated): 20x   Fwd/Bkw weight shifting in standing without as tolereated: 20x  L ankle place ups to 4" step with fwd weight shift: 20x    R LE lift offs to SLS tolerance with HHA PRN: x1'  R LE place ups to 6" step x10     manual therapy techniques  for 5 minutes, including: to " begin at 10 weeks post-op on 5/25/2022.   -Manual stretch of L ankle into PF in supine in order to stretch gastroc and desensitize L foot.       PATIENT EDUCATION AND HOME EXERCISES      Home Exercises Provided and Patient Education Provided     Education provided: Pt was educated on all therapeutic exercises initiated during today's tx visit.     Written Home Exercises Provided: Patient instructed to cont prior HEP. Exercises were reviewed and Albin was able to demonstrate them prior to the end of the session.  Albin demonstrated good  understanding of the education provided. See EMR under Patient Instructions for exercises provided during therapy sessions    ASSESSMENT   Albin is a 41 y.o. male referred to outpatient Physical Therapy with a medical diagnosis of Peroneal neuritis, left; Osteochondritis dissecans of ankle, left; Inversion sprain of ankle, left, sequela.   Mr. 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 He then had skilled PT intervention with no resolution of s/s and subsequently had ATSx on 3/16/2022. Procedure: Left ankle arthroscopy, debridement and microfracture, Left ankle brostrom with artelon reconstuction, Intraoperative use of fluoroscopy <1hr.       Currently protocol as follows:   --Gait training, edema control/desensitization modalities   --ADAT WBAT LLE, immobilization: boot; may initiate boot weaning 10 weeks postop   --AROM/AAROM and gentle strengthening/theraband work, but no PROM/manipulation until 10 weeks postop (5/25/2022)    Pt continues to report to session NWB L LE with scooter. Pt does present with boot which was utilized during WB activities. Pt demonstrated a picture of his wound in his lower L leg. The wound appears to be infected. Pt was instructed to send a message through [facility] to his physician, Mitali Hager. Pt continues to report increase in L foot/ankle pain,  "specifically about the great toe, when bending/forward flexing the lumbar spine (I.e. when bending down to grab his boot to don).Pt also stated that decreased trunk flexion is required to provoke nerve symptoms when compared to previous week. He also continues to present with ongoing severe L ankle/foot "nerve" pain upon arrival with ongoing significant hypersensitivity in dorsum of foot (to light touch, air blowing on the area, etc). He does have a new referral placed by his provider for lumbar spine imaging - awaiting approval at this time.   Despite ongoing moderate to severe s/s, pt did tolerate a mild increase in both open and closed chain treatment activities as indicated above. Utilized with WB activities focusing on improving WB status statically and dynamically. Planning to initiate B axillary crutch training as he is able to tolerate. Progress pt as tolerated.     Important to note the pt has 2 additional approved PT sessions. Will reach out to referring MD to request a new referral to be placed.     Currently: Pt presents for the 15th of 24th visits. It was noted that his distal LLE wound appears to improving as it continues to heel from the outside in. Continued to focus on extension based exercises and sciatic nerve glides in hopes of reducing distal symptoms. Flexion based exercises as well as WB in boot increased nerve pain into toes. Pt would benefit from continuing skilled PT to improve post op pain, ROM, and strength.     Pt endorsed continued increased sensation and pain in distal L LE specially in weightbearing. Hence, only open chain activities were performed during today's treatment visit. Pt tolerated tx fairly as pt reported significant dorsal L foot pain with any type of ankle movements and with LAQs/sciatic nerve glides.      Pt is waiting on authorization for a lumbar injection from pain management MD. Pt also noted that MD also referred him to a would specialist secondary to his open " wound in his LLE. Manual tx was performed during today's tx visit in order to stretch gastroc and desensitize L foot. Lastly, it was also noted that pt continued to endorse significant pain/radicular symptoms in plantar aspect of L foot when weightbearing.     Pt is 13 weeks and 5 days s/p ( DOS 3/16/2022) Left ankle arthroscopy, debridement and microfracture, Left ankle brostrom with artelon reconstuction, Intraoperative use of fluoroscopy. His rehab process has been complicated by severe pain and  Hypersensitivity to light touch in L LE. Pt is awaiting scheduling for lumbar MRI.  Ankle ROM was not progressed to WB due to high pain levels with NWB ankle ROM. Pt is compliant with the rehab process but overall progress has been limited due to pain levels. Continues to remain appropriate for skilled PT to address post operative pain, weakness, and decreased mobility to allow pt to return to PLOF and work.     The patient's current job specific task deficits include the following:  L ankle pain, hypersensitivity to light touch, marked limited L ankle/foot AROM in all planes of motion, weakness and atrophy noted which has led to functional deficits including inability to ambulate at this time and therefore do any closed chain activity at this time.     Albin Is progressing well towards his goals.   Pt prognosis is Good.     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    Pt's spiritual, cultural and educational needs considered and pt agreeable to plan of care and goals.     Anticipated barriers to physical therapy: acuity of current injury, chronicity of current injury and current post-op status    Goals:     HEP:    - Pt will become compliant and independent with  his initial HEP in 2 weeks to promote decreased pain and improved mobility and strength. MET  - Pt will become compliant and independent with an updated, progressed HEP in 12 weeks to promote decreased pain and improved mobility and strength as well as prep for discharge from further PT intervention. In progress   Pain:   NOT MET   - Pt to report decrease in pain levels from 10/10 at worse to 8/10 at worse in 2 weeks.  - Pt to report decrease in pain levels from 10/10 at worse to 6/10 at worse in 4 weeks.  - Pt to report decrease in pain levels from 10/10 at worse to 4/10 at worse in 8 weeks.  Impairment level:  NOT MET   - Pt to increase DF ROM to neutral (unable to achieve neutral on eval) by week 2 in order to promote improved ROM and ability to WBAT in boot.   - Pt to increase DF ROM to 10* by week 12 in order to promote return to function with ability to ambulate, squat, and perform stair negotiation.   - MMT goals will be set when appropriately able to assess; not indicated at this time.   Function level:  In progress   - Pt will improve his FOTO score from 85% impairment to 50% improvement to indicate improvement in overall function in 12 weeks.   - Pt will be able to place approx 50% weight through the LLE with boot in place in 2 weeks.   - Pt will be able to place approx 75% weight through the LLE with boot in place in 3 weeks.   - Pt will be able to ambulate with boot, WBAT in 4 weeks with AD as needed to promote energy conservation and community ambulation.   - Pt will be able to transition from boot to WBAT by week 10 post-op.      - RTW goals will be set at future visits when appropriately able to assess baseline measures.      Pt will return to work full duty, full time.    Plan   Updated Plan of care Certification: 6/6/22 to 8/29/22     Outpatient Physical Therapy 2 times weekly for 12 weeks to include the following interventions: Gait Training, Manual Therapy, Moist Heat/ Ice, Neuromuscular Re-ed,  Patient Education, Self Care, Therapeutic Activities and Therapeutic Exercise.      Upon discharge from further skilled PT intervention it will determined if the need for a work conditioning program or Functional Capacity Evaluation is required to allow the injured worker to return to work with full potential achieved, continued improvement with body mechanics with advanced functional activities, and further prevention of future work-related injuries.       Frankie Reyes, PTA   6/20/2022

## 2022-06-20 NOTE — PROGRESS NOTES
[employer] Physical Therapy Daily Treatment Note     Name: Albin Barfield  Clinic Number: [id number]    Therapy Diagnosis:   No diagnosis found.  Physician: Mitali Hager MD    Visit Date: 6/20/2022    Physician Orders: PT Eval and Treat   Comments:  S/p Left ankle arthroscopy, debridement and microfracture  Left ankle brostrom with artelon reconstuction  Intraoperative use of fluoroscopy <1hr by Dr. Hager on 3-16-22.  PT: Start date: 4 weeks postop  Rx/protocol and restrictions:   --Gait training, edema control/desensitization modalities  --ADAT WBAT LLE, immobilization: boot; may initiate boot weaning 10 weeks postop   --AROM/AAROM and gentle strengthening/theraband work, but no PROM/manipulation until 10 weeks postop     Medical Diagnosis from Referral:   G57.32 (ICD-10-CM) - Peroneal neuritis, left   M93.272 (ICD-10-CM) - Osteochondritis dissecans of ankle, left   S93.402S (ICD-10-CM) - Inversion sprain of ankle, left, sequela      Evaluation Date: 4/18/2022  Authorization Period Expiration: 3/31/2023  Plan of Care Expiration: updated to 8/29/22  Progress Note Due: 7/4/2022  Visit # / Visits authorized: 12/12, 8/12  FOTO: next at 24th visit  PTA: 4/5  (# of No Show Appts 0/Number of Cancelled Appts 0)    Time In: 1:45 pm  Time Out: 2:45 pm  Total Appointment Time (timed & untimed codes): 60 minutes (4 TE)    Precautions: Standard and chronic pain, Sx precautions    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 Pt also states that he continues to feel plantar foot pain when weightbearing on the L foot. Lastly, pt stated that his low back has begun to hurt some but not hurting at the moment.   Response to previous  "treatment: no adverse effects  Functional change: Able to put more weight through the L LE than when initiated PT    Pain: 6/10, 1-2/10 LBP, 3/10 R calf   Location: dosum of foot as well anterior and lateral ankle , distal LE and toes, low back, R calf    OBJECTIVE     Objective Measures updated at progress report unless specified.     TREATMENT     Albin received the treatments listed below:      therapeutic exercises (bold exercises performed) to develop strength, endurance, ROM and flexibility for 55 minutes including activities below and screening above:    Seated sciatic nn glides/LAQ to s/s, not through s/s 2x10   Prone on elbows x 3'  Prone press up 2x10 3 sec hold   Supine PIR stretch 3x30" L LE contralateral LE in full supine  Supine gastroc stretches 2x30 sec/RLE only  Soleus stretches in sitting 2x30 sec ea side  PF with YTB in supine x10  DF with YTB in supine x10 (increased foot pain)    Supine bridging (to tolerance ) 2x10   Prone SLR 2x10  Prone HS curls 2x15     Seated DF/PF with 1/2 foam roll (assist from R LE) 2x10  Seated inv/ev with slide board and pillowcase 10x ea direction     Standing L LE Hip abduction with boot 2x15  Standing L LE hip extension with boot 2x15    Lateral weight shifting in standing without boot (as tolerated): 20x   Fwd/Bkw weight shifting in standing without as tolereated: 20x  L ankle place ups to 4" step with fwd weight shift: 20x    R LE lift offs to SLS tolerance with HHA PRN: x1'  R LE place ups to 6" step x10     manual therapy techniques  for 5 minutes, including: to begin at 10 weeks post-op on 5/25/2022.   -Manual stretch of L ankle into PF in supine in order to stretch gastroc and desensitize L foot.       PATIENT EDUCATION AND HOME EXERCISES      Home Exercises Provided and Patient Education Provided     Education provided: Pt was educated on all therapeutic exercises initiated during today's tx visit.     Written Home Exercises Provided: Patient instructed to " "cont prior HEP. Exercises were reviewed and Albin was able to demonstrate them prior to the end of the session.  Albin demonstrated good  understanding of the education provided. See EMR under Patient Instructions for exercises provided during therapy sessions    ASSESSMENT   Albin is a 41 y.o. male referred to outpatient Physical Therapy with a medical diagnosis of Peroneal neuritis, left; Osteochondritis dissecans of ankle, left; Inversion sprain of ankle, left, sequela.   Mr. 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 He then had skilled PT intervention with no resolution of s/s and subsequently had ATSx on 3/16/2022. Procedure: Left ankle arthroscopy, debridement and microfracture, Left ankle brostrom with artelon reconstuction, Intraoperative use of fluoroscopy <1hr.       Currently protocol as follows:   --Gait training, edema control/desensitization modalities   --ADAT WBAT LLE, immobilization: boot; may initiate boot weaning 10 weeks postop   --AROM/AAROM and gentle strengthening/theraband work, but no PROM/manipulation until 10 weeks postop (5/25/2022)    Pt continues to report to session NWB L LE with scooter. Pt does present with boot which was utilized during WB activities. Pt demonstrated a picture of his wound in his lower L leg. The wound appears to be infected. Pt was instructed to send a message through [facility] to his physician, Mitali Hager. Pt continues to report increase in L foot/ankle pain, specifically about the great toe, when bending/forward flexing the lumbar spine (I.e. when bending down to grab his boot to don).Pt also stated that decreased trunk flexion is required to provoke nerve symptoms when compared to previous week. He also continues to present with ongoing severe L ankle/foot "nerve" pain upon arrival with ongoing significant hypersensitivity in dorsum of foot (to light " touch, air blowing on the area, etc). He does have a new referral placed by his provider for lumbar spine imaging - awaiting approval at this time.   Despite ongoing moderate to severe s/s, pt did tolerate a mild increase in both open and closed chain treatment activities as indicated above. Utilized with WB activities focusing on improving WB status statically and dynamically. Planning to initiate B axillary crutch training as he is able to tolerate. Progress pt as tolerated.     Important to note the pt has 2 additional approved PT sessions. Will reach out to referring MD to request a new referral to be placed.     Currently: Pt presents for the 15th of 24th visits. It was noted that his distal LLE wound appears to improving as it continues to heel from the outside in. Continued to focus on extension based exercises and sciatic nerve glides in hopes of reducing distal symptoms. Flexion based exercises as well as WB in boot increased nerve pain into toes. Pt would benefit from continuing skilled PT to improve post op pain, ROM, and strength.                   Pt presented with continued increased sensation in distal L LE as pt reported that he is also experiencing anterior distal LE pain since yesterday. Pt also stated that he visited pain management MD who will seek authorization for an lumbar injection and altered his current pain medication. Pt also noted that MD also referred him to a would specialist secondary to his open wound in his LLE. Manual tx was initiated during today's tx visit in order to stretch gastroc and desensitize L foot. Lastly, it was also noted that pt continued to endorse significant pain/radicular symptoms in plantar aspect of L foot when weightbearing.     Pt is 13 weeks s/p ( DOS 3/16/2022) Left ankle arthroscopy, debridement and microfracture, Left ankle brostrom with artelon reconstuction, Intraoperative use of fluoroscopy. His rehab process has been complicated by severe pain and   Hypersensitivity to light touch in L LE. Pt is awaiting scheduling for lumbar MRI.  Ankle ROM was not progressed to WB due to high pain levels with NWB ankle ROM. Pt is compliant with the rehab process but overall progress has been limited due to pain levels. Continues to remain appropriate for skilled PT to address post operative pain, weakness, and decreased mobility to allow pt to return to PLOF and work.     The patient's current job specific task deficits include the following:  L ankle pain, hypersensitivity to light touch, marked limited L ankle/foot AROM in all planes of motion, weakness and atrophy noted which has led to functional deficits including inability to ambulate at this time and therefore do any closed chain activity at this time.     Albin Is progressing well towards his goals.   Pt prognosis is Good.     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    Pt's spiritual, cultural and educational needs considered and pt agreeable to plan of care and goals.     Anticipated barriers to physical therapy: acuity of current injury, chronicity of current injury and current post-op status    Goals:     HEP:    - Pt will become compliant and independent with his initial HEP in 2 weeks to promote decreased pain and improved mobility and strength. MET  - Pt will become compliant and independent with an updated, progressed HEP in 12 weeks to promote decreased pain and improved mobility and strength as well as prep for discharge from further PT intervention. In progress   Pain:   NOT MET   - Pt to report decrease in pain levels from 10/10 at worse to 8/10 at worse in 2 weeks.  - Pt to report decrease in pain levels from 10/10 at worse to 6/10 at worse in 4  weeks.  - Pt to report decrease in pain levels from 10/10 at worse to 4/10 at worse in 8 weeks.  Impairment level:  NOT MET   - Pt to increase DF ROM to neutral (unable to achieve neutral on eval) by week 2 in order to promote improved ROM and ability to WBAT in boot.   - Pt to increase DF ROM to 10* by week 12 in order to promote return to function with ability to ambulate, squat, and perform stair negotiation.   - MMT goals will be set when appropriately able to assess; not indicated at this time.   Function level:  In progress   - Pt will improve his FOTO score from 85% impairment to 50% improvement to indicate improvement in overall function in 12 weeks.   - Pt will be able to place approx 50% weight through the LLE with boot in place in 2 weeks.   - Pt will be able to place approx 75% weight through the LLE with boot in place in 3 weeks.   - Pt will be able to ambulate with boot, WBAT in 4 weeks with AD as needed to promote energy conservation and community ambulation.   - Pt will be able to transition from boot to WBAT by week 10 post-op.      - RTW goals will be set at future visits when appropriately able to assess baseline measures.      Pt will return to work full duty, full time.    Plan   Updated Plan of care Certification: 6/6/22 to 8/29/22     Outpatient Physical Therapy 2 times weekly for 12 weeks to include the following interventions: Gait Training, Manual Therapy, Moist Heat/ Ice, Neuromuscular Re-ed, Patient Education, Self Care, Therapeutic Activities and Therapeutic Exercise.      Upon discharge from further skilled PT intervention it will determined if the need for a work conditioning program or Functional Capacity Evaluation is required to allow the injured worker to return to work with full potential achieved, continued improvement with body mechanics with advanced functional activities, and further prevention of future work-related injuries.       TISHA SPARROW, PT   6/20/2022

## 2022-06-23 ENCOUNTER — CLINICAL SUPPORT (OUTPATIENT)
Dept: REHABILITATION | Facility: HOSPITAL | Age: 42
End: 2022-06-23
Attending: ORTHOPAEDIC SURGERY
Payer: OTHER MISCELLANEOUS

## 2022-06-23 DIAGNOSIS — M79.672 LEFT FOOT PAIN: Primary | ICD-10-CM

## 2022-06-23 DIAGNOSIS — Z47.89 SURGICAL AFTERCARE, MUSCULOSKELETAL SYSTEM: ICD-10-CM

## 2022-06-23 PROCEDURE — 97110 THERAPEUTIC EXERCISES: CPT | Mod: PN,CQ

## 2022-06-23 NOTE — PROGRESS NOTES
"[employer] Physical Therapy Daily Treatment Note     Name: Albin Barfield  Clinic Number: [id number]    Therapy Diagnosis:   No diagnosis found.  Physician: No ref. provider found    Visit Date: 6/23/2022    Physician Orders: PT Eval and Treat   Comments:  S/p Left ankle arthroscopy, debridement and microfracture  Left ankle brostrom with artelon reconstuction  Intraoperative use of fluoroscopy <1hr by Dr. Hager on 3-16-22.  PT: Start date: 4 weeks postop  Rx/protocol and restrictions:   --Gait training, edema control/desensitization modalities  --ADAT WBAT LLE, immobilization: boot; may initiate boot weaning 10 weeks postop   --AROM/AAROM and gentle strengthening/theraband work, but no PROM/manipulation until 10 weeks postop     Medical Diagnosis from Referral:   G57.32 (ICD-10-CM) - Peroneal neuritis, left   M93.272 (ICD-10-CM) - Osteochondritis dissecans of ankle, left   S93.402S (ICD-10-CM) - Inversion sprain of ankle, left, sequela      Evaluation Date: 4/18/2022  Authorization Period Expiration: 3/31/2023  Plan of Care Expiration: updated to 8/29/22  Progress Note Due: 7/4/2022  Visit # / Visits authorized: 12/12, 10/12  FOTO: next at 24th visit  PTA: 6/5  (# of No Show Appts 0/Number of Cancelled Appts 0)    Time In: 12:50 pm  Time Out: 1:40 pm  Total Appointment Time (timed & untimed codes): 60 minutes (4 TE)    Precautions: Standard and chronic pain, Sx precautions    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 Pt also reports that he is not able to extend knee in sitting as far due to increase nerve pain in foot.    Response to previous treatment: no adverse " "effects  Functional change: Able to put more weight through the L LE than when initiated PT    Pain: 6/10, 1-2/10 LBP, 3/10 R calf   Location: dosum of foot as well anterior and lateral ankle , distal LE and toes, low back, R calf    OBJECTIVE     Objective Measures updated at progress report unless specified.   Percussion to patella with no increase in s/s  Percussion to the lateral fibular head with increase in distal lower leg s/s approx distal 1/3  Percussion to mid point tibia with mild increase in foot "nerve" s/s  (+) Slump test L LE mid range of motion, biasing ankle as well as lumbar spine  Increased foot "nerve" s/s with lumbar flexion in closed and open chain (sitting)  - obj screening completed by PT Maximino Blake PT, DPT, OCS today    TREATMENT     Albin received the treatments listed below:      therapeutic exercises (bold exercises performed) to develop strength, endurance, ROM and flexibility for 50 minutes including activities below and screening above:    Seated sciatic nn glides/LAQ to s/s, not through s/s 2x10   Prone on elbows x 3'  Prone press up 2x10 3 sec hold   Supine PIR stretch 3x30" L LE contralateral LE in full supine  Supine gastroc stretches 3x30 sec/RLE only  Soleus stretches in sitting 3x30 sec ea side  PF with YTB in supine x15  DF with YTB in supine x15 (increased foot pain)  Supine bridging (to tolerance ) 2x10   Prone SLR 2x10  Prone HS curls 2x10     Seated DF/PF with 1/2 foam roll (assist from R LE) 2x10  Seated inv/ev with slide board and pillowcase 10x ea direction     Standing L LE Hip abduction with boot 2x15  Standing L LE hip extension with boot 2x15    Lateral weight shifting in standing without boot (as tolerated): 20x   Fwd/Bkw weight shifting in standing without as tolereated: 20x  L ankle place ups to 4" step with fwd weight shift: 20x    R LE lift offs to SLS tolerance with HHA PRN: x1'  R LE place ups to 6" step x10     manual therapy techniques  for 0 minutes, " including: to begin at 10 weeks post-op on 5/25/2022.   -Manual stretch of L ankle into PF in supine in order to stretch gastroc and desensitize L foot.       PATIENT EDUCATION AND HOME EXERCISES      Home Exercises Provided and Patient Education Provided     Education provided: Pt was educated on all therapeutic exercises initiated during today's tx visit.     Written Home Exercises Provided: Patient instructed to cont prior HEP. Exercises were reviewed and Albin was able to demonstrate them prior to the end of the session.  Albin demonstrated good  understanding of the education provided. See EMR under Patient Instructions for exercises provided during therapy sessions    ASSESSMENT   Albin is a 41 y.o. male referred to outpatient Physical Therapy with a medical diagnosis of Peroneal neuritis, left; Osteochondritis dissecans of ankle, left; Inversion sprain of ankle, left, sequela.   Mr. 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 He then had skilled PT intervention with no resolution of s/s and subsequently had ATSx on 3/16/2022. Procedure: Left ankle arthroscopy, debridement and microfracture, Left ankle brostrom with artelon reconstuction, Intraoperative use of fluoroscopy <1hr.       Currently protocol as follows:   --Gait training, edema control/desensitization modalities   --ADAT WBAT LLE, immobilization: boot; may initiate boot weaning 10 weeks postop   --AROM/AAROM and gentle strengthening/theraband work, but no PROM/manipulation until 10 weeks postop (5/25/2022)    Pt continues to report to session NWB L LE with scooter. Pt does present with boot which was utilized during WB activities. Pt demonstrated a picture of his wound in his lower L leg. The wound appears to be infected. Pt was instructed to send a message through [facility] to his physician, Mitali Hager. Pt continues to report increase in L  "foot/ankle pain, specifically about the great toe, when bending/forward flexing the lumbar spine (I.e. when bending down to grab his boot to don).Pt also stated that decreased trunk flexion is required to provoke nerve symptoms when compared to previous week. He also continues to present with ongoing severe L ankle/foot "nerve" pain upon arrival with ongoing significant hypersensitivity in dorsum of foot (to light touch, air blowing on the area, etc). He does have a new referral placed by his provider for lumbar spine imaging - awaiting approval at this time.   Despite ongoing moderate to severe s/s, pt did tolerate a mild increase in both open and closed chain treatment activities as indicated above. Utilized with WB activities focusing on improving WB status statically and dynamically. Planning to initiate B axillary crutch training as he is able to tolerate. Progress pt as tolerated.     Important to note the pt has 2 additional approved PT sessions. Will reach out to referring MD to request a new referral to be placed.     Currently: Pt presents for the 15th of 24th visits. It was noted that his distal LLE wound appears to improving as it continues to heel from the outside in. Continued to focus on extension based exercises and sciatic nerve glides in hopes of reducing distal symptoms. Flexion based exercises as well as WB in boot increased nerve pain into toes. Pt would benefit from continuing skilled PT to improve post op pain, ROM, and strength.     Pt was examined by PT of record upon arrival and pt demonstrated capillary refill in 5 seconds in anterior L ankle and 7 seconds capillary refill in 2nd toe. Pt also endorsed continued increased sensation and pain in distal L LE specially in weightbearing. Hence, only open chain activities were performed during today's treatment visit. Pt tolerated tx fairly as pt reported significant dorsal L foot pain with any type of ankle movements and with LAQs/sciatic nerve " armen.      Pt is waiting on authorization for a lumbar injection from pain management MD. Pt also noted that MD also referred him to a would specialist secondary to his open wound in his LLE. Lastly, it was also noted that pt continued to endorse significant pain/radicular symptoms in plantar aspect of L foot when weightbearing.     Pt is 14 weeks and 1 day s/p ( DOS 3/16/2022) Left ankle arthroscopy, debridement and microfracture, Left ankle brostrom with artelon reconstuction, Intraoperative use of fluoroscopy. His rehab process has been complicated by severe pain and  Hypersensitivity to light touch in L LE. Pt is awaiting scheduling for lumbar MRI.  Ankle ROM was not progressed to WB due to high pain levels with NWB ankle ROM. Pt is compliant with the rehab process but overall progress has been limited due to pain levels. Continues to remain appropriate for skilled PT to address post operative pain, weakness, and decreased mobility to allow pt to return to PLOF and work.     The patient's current job specific task deficits include the following:  L ankle pain, hypersensitivity to light touch, marked limited L ankle/foot AROM in all planes of motion, weakness and atrophy noted which has led to functional deficits including inability to ambulate at this time and therefore do any closed chain activity at this time.     Albin Is progressing well towards his goals.   Pt prognosis is Good.     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    Pt's spiritual, cultural and educational needs considered and pt agreeable to plan of care and goals.     Anticipated barriers to physical therapy: acuity of current injury, chronicity of current injury and current post-op  status    Goals:     HEP:    - Pt will become compliant and independent with his initial HEP in 2 weeks to promote decreased pain and improved mobility and strength. MET  - Pt will become compliant and independent with an updated, progressed HEP in 12 weeks to promote decreased pain and improved mobility and strength as well as prep for discharge from further PT intervention. In progress   Pain:   NOT MET   - Pt to report decrease in pain levels from 10/10 at worse to 8/10 at worse in 2 weeks.  - Pt to report decrease in pain levels from 10/10 at worse to 6/10 at worse in 4 weeks.  - Pt to report decrease in pain levels from 10/10 at worse to 4/10 at worse in 8 weeks.  Impairment level:  NOT MET   - Pt to increase DF ROM to neutral (unable to achieve neutral on eval) by week 2 in order to promote improved ROM and ability to WBAT in boot.   - Pt to increase DF ROM to 10* by week 12 in order to promote return to function with ability to ambulate, squat, and perform stair negotiation.   - MMT goals will be set when appropriately able to assess; not indicated at this time.   Function level:  In progress   - Pt will improve his FOTO score from 85% impairment to 50% improvement to indicate improvement in overall function in 12 weeks.   - Pt will be able to place approx 50% weight through the LLE with boot in place in 2 weeks.   - Pt will be able to place approx 75% weight through the LLE with boot in place in 3 weeks.   - Pt will be able to ambulate with boot, WBAT in 4 weeks with AD as needed to promote energy conservation and community ambulation.   - Pt will be able to transition from boot to WBAT by week 10 post-op.      - RTW goals will be set at future visits when appropriately able to assess baseline measures.      Pt will return to work full duty, full time.    Plan   Updated Plan of care Certification: 6/6/22 to 8/29/22     Outpatient Physical Therapy 2 times weekly for 12 weeks to include the following  interventions: Gait Training, Manual Therapy, Moist Heat/ Ice, Neuromuscular Re-ed, Patient Education, Self Care, Therapeutic Activities and Therapeutic Exercise.      Upon discharge from further skilled PT intervention it will determined if the need for a work conditioning program or Functional Capacity Evaluation is required to allow the injured worker to return to work with full potential achieved, continued improvement with body mechanics with advanced functional activities, and further prevention of future work-related injuries.       Frankie Reyes, PTA   6/23/2022

## 2022-06-24 ENCOUNTER — CLINICAL SUPPORT (OUTPATIENT)
Dept: REHABILITATION | Facility: HOSPITAL | Age: 42
End: 2022-06-24
Attending: ORTHOPAEDIC SURGERY
Payer: OTHER MISCELLANEOUS

## 2022-06-24 DIAGNOSIS — Z47.89 SURGICAL AFTERCARE, MUSCULOSKELETAL SYSTEM: ICD-10-CM

## 2022-06-24 DIAGNOSIS — M79.672 LEFT FOOT PAIN: Primary | ICD-10-CM

## 2022-06-24 PROCEDURE — 97110 THERAPEUTIC EXERCISES: CPT | Mod: PN

## 2022-06-24 NOTE — PROGRESS NOTES
"[employer] Physical Therapy Daily Treatment Note     Name: Albin Barfield  Clinic Number: [id number]    Therapy Diagnosis:   Encounter Diagnoses   Name Primary?    Left foot pain Yes    Surgical aftercare, musculoskeletal system      Physician: Mitali Hager MD    Visit Date: 6/24/2022    Physician Orders: PT Eval and Treat   Comments:  S/p Left ankle arthroscopy, debridement and microfracture  Left ankle brostrom with artelon reconstuction  Intraoperative use of fluoroscopy <1hr by Dr. Hager on 3-16-22.  PT: Start date: 4 weeks postop  Rx/protocol and restrictions:   --Gait training, edema control/desensitization modalities  --ADAT WBAT LLE, immobilization: boot; may initiate boot weaning 10 weeks postop   --AROM/AAROM and gentle strengthening/theraband work, but no PROM/manipulation until 10 weeks postop     Medical Diagnosis from Referral:   G57.32 (ICD-10-CM) - Peroneal neuritis, left   M93.272 (ICD-10-CM) - Osteochondritis dissecans of ankle, left   S93.402S (ICD-10-CM) - Inversion sprain of ankle, left, sequela      Evaluation Date: 4/18/2022  Authorization Period Expiration: 3/31/2023  Plan of Care Expiration: updated to 8/29/22  Progress Note Due: 7/4/2022  Visit # / Visits authorized: 12/12, 11/12  FOTO: next at 24th visit  PTA: 0/5  (# of No Show Appts 0/Number of Cancelled Appts 0)    Time In: 145pm  Time Out: 240pm  Total Appointment Time (timed & untimed codes): 55 minutes (4 TE)     Precautions: Standard and chronic pain, Sx precautions    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 Reports over the past few weeks he feels as though his s/s are changing. " "Reports that he has f/u with his MD regarding had ultimately had Xrays taken. Reports no Fx round. Reports that he is supposed to be set up with wound care due to ongoing surgical site healing, not fully closed. Also reports that he is supposed to be receiving MEHRAN to the lumbar spine due to findings and is to f/u with pain management. Pt also reports that he is not able to extend knee in sitting as far due to increase nerve pain in foot and same goes for bending his back, increases his lateral foot s/s and great toe pain. Reports that his hypersensitivity has decreased to some degree though still unable to touch his foot around the dorsum of the great toe, still unable to put on a shoe/sock... only able to wrap with ace bandages.   Response to previous treatment: no adverse effects  Functional change: Able to put more weight through the L LE than when initiated PT    Pain: 5/10, 3/10 LBP, "not bad"/10 R calf   Location: dosum of foot as well anterior and lateral ankle , distal LE and toes, low back, R calf    OBJECTIVE     Objective Measures updated at progress report unless specified.   Percussion to patella with no increase in s/s  Percussion to the lateral fibular head with increase in distal lower leg s/s approx distal 1/3  Percussion to mid point tibia with mild increase in foot "nerve" s/s  (+) Slump test L LE mid range of motion, biasing ankle as well as lumbar spine  Increased foot "nerve" s/s with lumbar flexion in closed and open chain (sitting)    Capillary refill 2nd digit 9"    Observations: Pt to clinic with NWB with scooter. Pt did not bring boot with him today as he was under the impression that he was here for his eval only. Steri strips in place about each surgical site. Appears to be healing well though not fully closed at all incision sites. Pt reports stitches were removed at last MD visit on 3/31/2022. Mild discoloration of the L foot consistent      Palpation: pt ttp to hypersensitivity to " "light touch to the rearfoot, forefoot; TC joint and distal L LE non specific, plantar surface of the foot      Ankle AROM Right (*) = in degrees Left (*) = in degrees Comments: for all ROM care taken only light ROM, no pushing at all!   Dorsiflexion WNL -19p  0p Painful anterior ankle about the TC joint   Plantarflexion WNL 26p  34p Painful and stiff throughout    Inversion WNL -  22 -   Eversion WNL 5  4     Great toe extension WNL 32  40 Marked stiffness reported and pain about the great toe   Ankle PROM Right (*) = in degrees Left (*) = in degrees DF PROM      Ankle MMT: Not indicated at this time!      Circumferential measures: marked ttp during testing  Circumferential measures Right in cm Left in cm   Midpoint of G/S complex 36.5 33.4  35.0   At [place] NT 27.0  26.8   At Metatarsal heads NT 24.0  23.5      Functional Job Specific Testing: NT at this time as not indicated, will follow up at a later date when appropriate per post-op protocol     Limitation/Restriction for FOTO Ankle Survey     Therapist reviewed FOTO scores for Albin Barfield on 4/18/2022.   FOTO documents entered into [employer] - see Media section.     Limitation Score: 85%  Goal: 50%   Currently: 79%         60 second sit to stand 7 steps  60 second step up test 12 reps  Ambulation tolerance 1:20 48ft stopped due to sever increase in nn pain reported   Ambulation tolerance 0:58 36ft stopped due to sever increase in nn pain reported    TREATMENT     Albin received the treatments listed below:      therapeutic exercises (bold exercises performed) to develop strength, endurance, ROM and flexibility for 55 minutes including activities below and screening above:    Seated sciatic nn glides/LAQ to s/s, not through s/s 2x10   Prone on elbows x 3'  Prone press up 2x10 3 sec hold   Supine PIR stretch 3x30" L LE contralateral LE in full supine  Supine gastroc stretches 3x30 sec/RLE only  Soleus stretches in sitting 3x30 sec ea side  PF with YTB in " "supine x15  DF with YTB in supine x15 (increased foot pain)  Supine bridging (to tolerance ) 2x10   Prone SLR 2x10  Prone HS curls 2x10     Seated DF/PF with 1/2 foam roll (assist from R LE) 2x10  Seated inv/ev with slide board and pillowcase 10x ea direction     Standing L LE Hip abduction with boot 2x15  Standing L LE hip extension with boot 2x15    Lateral weight shifting in standing without boot (as tolerated): 20x   Fwd/Bkw weight shifting in standing without as tolereated: 20x  L ankle place ups to 4" step with fwd weight shift: 20x    R LE lift offs to SLS tolerance with HHA PRN: x1'  R LE place ups to 6" step x10     manual therapy techniques  for 0 minutes, including: to begin at 10 weeks post-op on 5/25/2022.   -Manual stretch of L ankle into PF in supine in order to stretch gastroc and desensitize L foot.   - PROM into DF to tolerance  - 1st through 5th ray JM    PATIENT EDUCATION AND HOME EXERCISES      Home Exercises Provided and Patient Education Provided     Education provided: Pt was educated on all therapeutic exercises initiated during today's tx visit.     Written Home Exercises Provided: Patient instructed to cont prior HEP. Exercises were reviewed and Albin was able to demonstrate them prior to the end of the session.  Albin demonstrated good  understanding of the education provided. See EMR under Patient Instructions for exercises provided during therapy sessions    ASSESSMENT   Albin is a 41 y.o. male referred to outpatient Physical Therapy with a medical diagnosis of Peroneal neuritis, left; Osteochondritis dissecans of ankle, left; Inversion sprain of ankle, left, sequela.   Mr. 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 He then had skilled PT intervention with no resolution of s/s and subsequently had ATSx on 3/16/2022. Procedure: Left ankle arthroscopy, debridement and microfracture, " Left ankle brostrom with artelon reconstuction, Intraoperative use of fluoroscopy <1hr.       Currently protocol as follows:   --Gait training, edema control/desensitization modalities   --ADAT WBAT LLE, immobilization: boot; may initiate boot weaning 10 weeks postop   --AROM/AAROM and gentle strengthening/theraband work, but no PROM/manipulation until 10 weeks postop (5/25/2022)    Pt continues to report to session NWB L LE with scooter. Pt does present with ace wrap in place as he reports inability to tolerate wearing socks/shoes. Since last visit he has received Xrays to the lower leg due to ongoing s/s which he reports were negative for pathology. He also reports that he is still awaiting scheduling wound care for ongoing LE wound and pain management for ongoing pain and suspected lumbar s/s.   Able to still reproduce foot s/s and great toe s/s with lumbar testing (lumbar flexion in both closed and open chain, (+) Slump, (+) SLR) . Due to this have added lumbar activities into treatment session in order to promote reduction in pain to promote improved tolerance to therapy. Feel as though this will need to be managed in order for optimal results with therapy and very well is limiting his progress.   Pt also with reduced capillary refill though distal pulses intact. Still with marked hypersensitivity as described prior.   Pt has 1 remaining PT session authorized at this time. He is no longer following up with providers within the [facility] system. Discussed with the pt the process of obtaining additional authorization and continuation of therapy. His external provider will need to submit PT orders/1010 to his . If approved we will need a hard copy and would be able to proceed with PT intervention at that time. Though this is the case, again, feel as though he is marked limited due to comorbidity at this time. He certainly would benefit from further skilled intervention as he has demonstrated very slow  progress, though we are limited in ability to progress pt as s/s experienced consistently display marked severity and irritability in s/s.  The patient's current job specific task deficits include the following:  L ankle pain, hypersensitivity to light touch, marked limited L ankle/foot AROM in all planes of motion, weakness and atrophy noted which has led to functional deficits including inability to ambulate at this time and therefore do any closed chain activity at this time.     Albin Is progressing well towards his goals.   Pt prognosis is Good.     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    Pt's spiritual, cultural and educational needs considered and pt agreeable to plan of care and goals.     Anticipated barriers to physical therapy: acuity of current injury, chronicity of current injury and current post-op status    Goals:     HEP:    - Pt will become compliant and independent with his initial HEP in 2 weeks to promote decreased pain and improved mobility and strength. MET  - Pt will become compliant and independent with an updated, progressed HEP in 12 weeks to promote decreased pain and improved mobility and strength as well as prep for discharge from further PT intervention. In progress   Pain:   NOT MET   - Pt to report decrease in pain levels from 10/10 at worse to 8/10 at worse in 2 weeks.  - Pt to report decrease in pain levels from 10/10 at worse to 6/10 at worse in 4 weeks.  - Pt to report decrease in pain levels from 10/10 at worse to 4/10 at worse in 8 weeks.  Impairment level:  NOT MET   - Pt to increase DF ROM to neutral (unable to achieve neutral on eval) by week 2 in order to promote improved ROM and ability to WBAT in boot. MET  -  Pt to increase DF ROM to 10* by week 12 in order to promote return to function with ability to ambulate, squat, and perform stair negotiation. Not 10* though able to negotiate stairs  - MMT goals will be set when appropriately able to assess; not indicated at this time.   Function level:  In progress   - Pt will improve his FOTO score from 85% impairment to 50% improvement to indicate improvement in overall function in 12 weeks.   - Pt will be able to place approx 50% weight through the LLE with boot in place in 2 weeks. MET  - Pt will be able to place approx 75% weight through the LLE with boot in place in 3 weeks. MET  - Pt will be able to ambulate with boot, WBAT in 4 weeks with AD as needed to promote energy conservation and community ambulation. MET   - Pt will be able to transition from boot to WBAT by week 10 post-op. MET though marked limited with ambulatory tolerance     - RTW goals will be set at future visits when appropriately able to assess baseline measures.      Pt will return to work full duty, full time.    Plan   Updated Plan of care Certification: 6/6/22 to 8/29/22     Outpatient Physical Therapy 2 times weekly for 12 weeks to include the following interventions: Gait Training, Manual Therapy, Moist Heat/ Ice, Neuromuscular Re-ed, Patient Education, Self Care, Therapeutic Activities and Therapeutic Exercise.      Upon discharge from further skilled PT intervention it will determined if the need for a work conditioning program or Functional Capacity Evaluation is required to allow the injured worker to return to work with full potential achieved, continued improvement with body mechanics with advanced functional activities, and further prevention of future work-related injuries.       Maximino Blake, PT   6/30/2022

## 2022-06-27 ENCOUNTER — CLINICAL SUPPORT (OUTPATIENT)
Dept: REHABILITATION | Facility: HOSPITAL | Age: 42
End: 2022-06-27
Attending: ORTHOPAEDIC SURGERY
Payer: OTHER MISCELLANEOUS

## 2022-06-27 DIAGNOSIS — Z47.89 SURGICAL AFTERCARE, MUSCULOSKELETAL SYSTEM: ICD-10-CM

## 2022-06-27 DIAGNOSIS — M79.672 LEFT FOOT PAIN: Primary | ICD-10-CM

## 2022-06-27 PROCEDURE — 97110 THERAPEUTIC EXERCISES: CPT | Mod: PN

## 2022-06-27 NOTE — PROGRESS NOTES
[employer] Physical Therapy Daily Treatment Note     Name: Albin Barfield  Clinic Number: [id number]    Therapy Diagnosis:   Encounter Diagnoses   Name Primary?    Left foot pain Yes    Surgical aftercare, musculoskeletal system      Physician: Mitali Hager MD    Visit Date: 6/27/2022    Physician Orders: PT Eval and Treat   Comments:  S/p Left ankle arthroscopy, debridement and microfracture  Left ankle brostrom with artelon reconstuction  Intraoperative use of fluoroscopy <1hr by Dr. Hager on 3-16-22.  PT: Start date: 4 weeks postop  Rx/protocol and restrictions:   --Gait training, edema control/desensitization modalities  --ADAT WBAT LLE, immobilization: boot; may initiate boot weaning 10 weeks postop   --AROM/AAROM and gentle strengthening/theraband work, but no PROM/manipulation until 10 weeks postop     Medical Diagnosis from Referral:   G57.32 (ICD-10-CM) - Peroneal neuritis, left   M93.272 (ICD-10-CM) - Osteochondritis dissecans of ankle, left   S93.402S (ICD-10-CM) - Inversion sprain of ankle, left, sequela      Evaluation Date: 4/18/2022  Authorization Period Expiration: 3/31/2023  Plan of Care Expiration: updated to 8/29/22  Progress Note Due: 7/4/2022  Visit # / Visits authorized: 12/12, 12/12  FOTO: next at 24th visit  PTA: 0/5  (# of No Show Appts 0/Number of Cancelled Appts 0)    Time In: 1230pm  Time Out: 130pm  Total Appointment Time (timed & untimed codes): 60 minutes (4 TE)     Precautions: Standard and chronic pain, Sx precautions    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 Also reports that  "he is supposed to be receiving MEHRAN to the lumbar spine due to findings and is to f/u with pain management. Pt also reports that he is not able to extend knee in sitting as far due to increase nerve pain in foot and same goes for bending his back, increases his lateral foot s/s and great toe pain. Reports that his hypersensitivity has decreased to some degree though still unable to touch his foot around the dorsum of the great toe, still unable to put on a shoe/sock... only able to wrap with ace bandages. He also mentioned he will contact his MD office to obtain additional therapy orders and authorization, though pt reports unsure of current plan and will need to f/u with MD regarding need for potential wound care and lumbar MEHRAN first to control s/s prior to PT.   Response to previous treatment: no adverse effects  Functional change: Able to put more weight through the L LE than when initiated PT    Pain: 5/10, 3/10 LBP; but pain easily elevates to 10/10  Location: dosum of foot as well anterior and lateral ankle , distal LE and toes, low back, R calf    OBJECTIVE     Objective Measures updated at LAST VISIT:    Percussion to patella with no increase in s/s  Percussion to the lateral fibular head with increase in distal lower leg s/s approx distal 1/3  Percussion to mid point tibia with mild increase in foot "nerve" s/s  (+) Slump test L LE mid range of motion, biasing ankle as well as lumbar spine  Increased foot "nerve" s/s with lumbar flexion in closed and open chain (sitting)    Capillary refill 2nd digit 9"    Observations: Pt to clinic with NWB with scooter. Pt did not bring boot with him today as he was under the impression that he was here for his eval only. Steri strips in place about each surgical site. Appears to be healing well though not fully closed at all incision sites. Pt reports stitches were removed at last MD visit on 3/31/2022. Mild discoloration of the L foot consistent      Palpation: pt ttp to " "hypersensitivity to light touch to the rearfoot, forefoot; TC joint and distal L LE non specific, plantar surface of the foot      Ankle AROM Right (*) = in degrees Left (*) = in degrees Comments: for all ROM care taken only light ROM, no pushing at all!   Dorsiflexion WNL -19p  0p Painful anterior ankle about the TC joint   Plantarflexion WNL 26p  34p Painful and stiff throughout    Inversion WNL -  22 -   Eversion WNL 5  4     Great toe extension WNL 32  40 Marked stiffness reported and pain about the great toe   Ankle PROM Right (*) = in degrees Left (*) = in degrees DF PROM      Ankle MMT: Not indicated at this time!      Circumferential measures: marked ttp during testing  Circumferential measures Right in cm Left in cm   Midpoint of G/S complex 36.5 33.4  35.0   At [place] NT 27.0  26.8   At Metatarsal heads NT 24.0  23.5      Functional Job Specific Testing: NT at this time as not indicated, will follow up at a later date when appropriate per post-op protocol     Limitation/Restriction for FOTO Ankle Survey     Therapist reviewed FOTO scores for Albin Barfield on 4/18/2022.   FOTO documents entered into [employer] - see Media section.     Limitation Score: 85%  Goal: 50%   Currently: 79%         60 second sit to stand 7 steps  60 second step up test 12 reps  Ambulation tolerance 1:20 48ft stopped due to sever increase in nn pain reported   Ambulation tolerance 0:58 36ft stopped due to sever increase in nn pain reported    TREATMENT     Albin received the treatments listed below:      therapeutic exercises (bold exercises performed) to develop strength, endurance, ROM and flexibility for 60 minutes including activities below and screening above:    Seated sciatic nn glides/LAQ to s/s, not through s/s 2x10   Prone on elbows x 3'  Prone press up 2x10 3 sec hold   Supine PIR stretch 3x30" L LE contralateral LE in full supine  Supine gastroc stretches 3x30 sec/RLE only  Soleus stretches in sitting 3x30 sec ea " "side  PF with YTB in supine x15  DF with YTB in supine x15 (increased foot pain)  Supine bridging (to tolerance ) 2x10   Prone SLR 2x10  Prone HS curls 2x10     Seated DF/PF with 1/2 foam roll (assist from R LE) 2x10  Seated inv/ev with slide board and pillowcase 10x ea direction     Standing L LE Hip abduction with boot 2x15  Standing L LE hip extension with boot 2x15    Lateral weight shifting in standing without boot (as tolerated): 20x   Fwd/Bkw weight shifting in standing without as tolereated: 20x  L ankle place ups to 4" step with fwd weight shift: 20x    R LE lift offs to SLS tolerance with HHA PRN: x1'  R LE place ups to 6" step x10     manual therapy techniques  for 0 minutes, including: to begin at 10 weeks post-op on 5/25/2022.   -Manual stretch of L ankle into PF in supine in order to stretch gastroc and desensitize L foot.   - PROM into DF to tolerance  - 1st through 5th ray JM    PATIENT EDUCATION AND HOME EXERCISES      Home Exercises Provided and Patient Education Provided     Education provided: Pt was educated on all therapeutic exercises initiated during today's tx visit.     Written Home Exercises Provided: Patient instructed to cont prior HEP. Exercises were reviewed and Albin was able to demonstrate them prior to the end of the session.  Albin demonstrated good  understanding of the education provided. See EMR under Patient Instructions for exercises provided during therapy sessions    ASSESSMENT   Albin is a 41 y.o. male referred to outpatient Physical Therapy with a medical diagnosis of Peroneal neuritis, left; Osteochondritis dissecans of ankle, left; Inversion sprain of ankle, left, sequela.   Mr. 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 He then had skilled PT intervention with no resolution of s/s and subsequently had ATSx on 3/16/2022. Procedure: Left ankle arthroscopy, debridement " and microfracture, Left ankle brostrom with artelon reconstuction, Intraoperative use of fluoroscopy <1hr.       Currently protocol as follows:   --Gait training, edema control/desensitization modalities   --ADAT WBAT LLE, immobilization: boot; may initiate boot weaning 10 weeks postop   --AROM/AAROM and gentle strengthening/theraband work, but no PROM/manipulation until 10 weeks postop (5/25/2022)    Pt reports to session today having completed 24 of 24 authorized PT sessions. He continues to report to session NWB L LE with scooter. Pt does present with ace wrap in place as he reports inability to tolerate wearing socks/shoes. It was noted at last visit that he has received Xrays to the lower leg due to ongoing s/s which he reports were negative for pathology. He also reports that he is still awaiting scheduling wound care for ongoing LE wound and pain management for ongoing pain and suspected lumbar s/s. He plans to contact his  after leaving therapy today for updates.   Able to still reproduce foot s/s and great toe s/s with lumbar testing (lumbar flexion in both closed and open chain, (+) Slump, (+) SLR) . Due to this have added lumbar activities into treatment session and HEP in order to promote reduction in pain to promote improved tolerance to therapy. Feel as though this will need to be managed in order for optimal results with therapy and very well is limiting his progress.   Pt also with ongoing reduced capillary refill though distal pulses intact. Still with marked hypersensitivity as described prior.   Pt has no remaining PT session authorized at this time. He is no longer following up with providers within the [facility] system. Discussed with the pt the process of obtaining additional authorization and continuation of therapy. His external provider will need to submit PT orders/1010 to his . If approved we will need a hard copy and would be able to proceed with PT intervention at that  time. Though this is the case, again, feel as though he is marked limited due to comorbidity at this time. He certainly would benefit from further skilled intervention as he has demonstrated very slow progress, though we are limited in ability to progress pt as s/s experienced consistently display marked severity and irritability in s/s.  The patient's current job specific task deficits include the following:  L ankle pain, hypersensitivity to light touch, marked limited L ankle/foot AROM in all planes of motion, weakness and atrophy noted which has led to functional deficits including inability to ambulate at this time and therefore do any closed chain activity at this time.     Albin Is progressing well towards his goals.   Pt prognosis is Good.     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    Pt's spiritual, cultural and educational needs considered and pt agreeable to plan of care and goals.     Anticipated barriers to physical therapy: acuity of current injury, chronicity of current injury and current post-op status    Goals:     HEP:    - Pt will become compliant and independent with his initial HEP in 2 weeks to promote decreased pain and improved mobility and strength. MET  - Pt will become compliant and independent with an updated, progressed HEP in 12 weeks to promote decreased pain and improved mobility and strength as well as prep for discharge from further PT intervention. In progress   Pain:   NOT MET   - Pt to report decrease in pain levels from 10/10 at worse to 8/10 at worse in 2 weeks.  - Pt to report decrease in pain levels from 10/10 at worse to 6/10 at worse in 4 weeks.  - Pt to report decrease in pain levels from 10/10 at worse to 4/10  at worse in 8 weeks.  Impairment level:  NOT MET   - Pt to increase DF ROM to neutral (unable to achieve neutral on eval) by week 2 in order to promote improved ROM and ability to WBAT in boot. MET  - Pt to increase DF ROM to 10* by week 12 in order to promote return to function with ability to ambulate, squat, and perform stair negotiation. Not 10* though able to negotiate stairs  - MMT goals will be set when appropriately able to assess; not indicated at this time.   Function level:  In progress   - Pt will improve his FOTO score from 85% impairment to 50% improvement to indicate improvement in overall function in 12 weeks.   - Pt will be able to place approx 50% weight through the LLE with boot in place in 2 weeks. MET  - Pt will be able to place approx 75% weight through the LLE with boot in place in 3 weeks. MET  - Pt will be able to ambulate with boot, WBAT in 4 weeks with AD as needed to promote energy conservation and community ambulation. MET   - Pt will be able to transition from boot to WBAT by week 10 post-op. MET though marked limited with ambulatory tolerance     - RTW goals will be set at future visits when appropriately able to assess baseline measures.      Pt will return to work full duty, full time.    Plan   Pt is being placed on hold at this time.   He is to continue with I with HEP and follow up with MD. Pt reports will contact his  regarding wound care and pain management/MEHRAN. Pt reports unsure of current plan and will need to f/u with MD regarding need for potential wound care and lumbar MEHRAN first to control s/s prior to PT. Pt reports he will let us know when he finds out plan moving fwd with his new provider/.     Maximino Blake, PT   6/30/2022

## 2022-06-27 NOTE — PATIENT INSTRUCTIONS
Access Code: [email or web address]  URL: [email or web address]  Date: 06/27/2022  Prepared by: Maximino Blake    Exercises  Long Sitting Calf Stretch with Strap - 2 x daily - 7 x weekly - 3 sets - 30 hold  Standing Dorsiflexion Self-Mobilization on Step - 2 x daily - 7 x weekly - 2 sets - 10 reps - 10 hold  Long Sitting Ankle Plantar Flexion with Resistance - 2 x daily - 7 x weekly - 3 sets - 10 reps  Seated Ankle Eversion with Resistance - 2 x daily - 7 x weekly - 3 sets - 10 reps  Single Leg Stance - 2 x daily - 7 x weekly - 3 sets - 60 hold  Walking - 2 x daily - 7 x weekly  Prone Press Up On Elbows - 2 x daily - 7 x weekly - 2 sets - 10 reps  Prone Press Up - 2 x daily - 7 x weekly - 2 sets - 10 reps  Supine Sciatic Nerve Glide - 2 x daily - 7 x weekly - 2 sets - 10 reps

## 2022-08-25 PROBLEM — M79.672 LEFT FOOT PAIN: Status: RESOLVED | Noted: 2022-04-18 | Resolved: 2022-08-25

## 2022-08-25 PROBLEM — Z47.89 SURGICAL AFTERCARE, MUSCULOSKELETAL SYSTEM: Status: RESOLVED | Noted: 2022-04-18 | Resolved: 2022-08-25

## 2024-02-01 ENCOUNTER — OFFICE VISIT (OUTPATIENT)
Dept: URGENT CARE | Facility: CLINIC | Age: 44
End: 2024-02-01

## 2024-02-01 VITALS
SYSTOLIC BLOOD PRESSURE: 125 MMHG | OXYGEN SATURATION: 95 % | HEIGHT: 72 IN | DIASTOLIC BLOOD PRESSURE: 84 MMHG | TEMPERATURE: 98 F | HEART RATE: 71 BPM | RESPIRATION RATE: 19 BRPM | WEIGHT: 202 LBS | BODY MASS INDEX: 27.36 KG/M2

## 2024-02-01 DIAGNOSIS — Z20.828 EXPOSURE TO INFLUENZA: ICD-10-CM

## 2024-02-01 DIAGNOSIS — R11.2 NAUSEA AND VOMITING, UNSPECIFIED VOMITING TYPE: ICD-10-CM

## 2024-02-01 DIAGNOSIS — H55.00 NYSTAGMUS: ICD-10-CM

## 2024-02-01 DIAGNOSIS — R42 VERTIGO: Primary | ICD-10-CM

## 2024-02-01 LAB
CTP QC/QA: YES
SARS-COV-2 AG RESP QL IA.RAPID: NEGATIVE

## 2024-02-01 PROCEDURE — 99203 OFFICE O/P NEW LOW 30 MIN: CPT | Mod: TIER,S$GLB,, | Performed by: PHYSICIAN ASSISTANT

## 2024-02-01 PROCEDURE — 87811 SARS-COV-2 COVID19 W/OPTIC: CPT | Mod: QW,TIER,S$GLB, | Performed by: PHYSICIAN ASSISTANT

## 2024-02-01 RX ORDER — MUPIROCIN 20 MG/G
OINTMENT TOPICAL
COMMUNITY
Start: 2024-01-19

## 2024-02-01 RX ORDER — MECLIZINE HCL 12.5 MG 12.5 MG/1
TABLET ORAL
Qty: 30 TABLET | Refills: 0 | Status: SHIPPED | OUTPATIENT
Start: 2024-02-01

## 2024-02-01 RX ORDER — PROMETHAZINE HYDROCHLORIDE 25 MG/1
25 TABLET ORAL EVERY 4 HOURS
COMMUNITY
Start: 2023-10-24 | End: 2024-02-01

## 2024-02-01 RX ORDER — PROMETHAZINE HYDROCHLORIDE 25 MG/ML
25 INJECTION, SOLUTION INTRAMUSCULAR; INTRAVENOUS
Status: DISCONTINUED | OUTPATIENT
Start: 2024-02-01 | End: 2024-02-01

## 2024-02-01 RX ORDER — ONDANSETRON 4 MG/1
4 TABLET, FILM COATED ORAL 2 TIMES DAILY
COMMUNITY

## 2024-02-01 RX ORDER — IBUPROFEN 800 MG/1
800 TABLET ORAL
COMMUNITY
Start: 2024-01-23

## 2024-02-01 RX ORDER — OSELTAMIVIR PHOSPHATE 75 MG/1
75 CAPSULE ORAL 2 TIMES DAILY
Qty: 10 CAPSULE | Refills: 0 | Status: SHIPPED | OUTPATIENT
Start: 2024-02-01 | End: 2024-02-06

## 2024-02-01 RX ORDER — HYDROCODONE BITARTRATE AND ACETAMINOPHEN 7.5; 325 MG/1; MG/1
1 TABLET ORAL
COMMUNITY
Start: 2024-01-23

## 2024-02-01 RX ORDER — SULFAMETHOXAZOLE AND TRIMETHOPRIM 800; 160 MG/1; MG/1
1 TABLET ORAL 2 TIMES DAILY
COMMUNITY
Start: 2024-01-19

## 2024-02-01 NOTE — PROGRESS NOTES
"Subjective:      Patient ID: Albin Barfield is a 43 y.o. male.    Vitals:  height is 6' (1.829 m) and weight is 91.6 kg (202 lb). His oral temperature is 98.3 °F (36.8 °C). His blood pressure is 125/84 and his pulse is 71. His respiration is 19 and oxygen saturation is 95%.     Chief Complaint: Emesis    Albin Barfield is a 43 y.o. male who complains of  Nausea , Vomiting, Dizziness and feeling off balanced that started today. Pt stated he just had a back surgery 2 days ago. They called the doctor who did his surgery and he was advised to go to  facility. Partner states that the surgeon said symptoms are not due to anesthesia since it is over 48 hours. Partner states it looks like he is "walking drunk". He had a zofran at 3 pm (1.5 hour ago).    Patient reports seeing bright lights in vision,feeling of "eye pulsating", and eyes going back and forth.  He reports Fatigue and Chills. Denies fevers, URI symptoms.        Emesis   This is a new problem. The current episode started today. The problem has been gradually worsening. The emesis has an appearance of stomach contents. There has been no fever. Associated symptoms include chills and dizziness. Pertinent negatives include no abdominal pain, arthralgias, chest pain, coughing, decreased urine volume, diarrhea, fever, headaches, myalgias, sweats, URI or weight loss. He has tried anti-emetic (Zofran) for the symptoms. The treatment provided no relief.   Dizziness:   Chronicity:  New  Onset:  Today  Progression since onset:  Gradually worsening  Frequency:  Constantly  Duration:  Off/on all day  Dizziness characteristics:  Off-balance, trouble focusing eyes and walking on uneven surface   Associated symptoms: nausea and vomiting.no hearing loss, no ear congestion, no ear pain, no fever, no headaches, no tinnitus, no diaphoresis, no aural fullness, no weakness, no visual disturbances, no light-headedness, no syncope, no palpitations, no panic, no facial weakness, " no slurred speech, no numbness in extremities and no chest pain.  Aggravated by:  Nothing  Treatments tried: zofran.  Improvements on treatment:  Mildno strokes, no cardiac surgery, no neurologic disease, no head trauma, no balance testing, no ear trauma, no ear surgery, no head trauma, no ear infections, no anxiety, no ear tubes, no environmental allergies, no MRI head and no CT head.      Constitution: Positive for activity change, appetite change, chills and fatigue. Negative for sweating, fever and generalized weakness.   HENT:  Negative for ear pain, ear discharge, foreign body in ear, tinnitus, hearing loss, congestion, postnasal drip, sinus pain, sinus pressure, sore throat and trouble swallowing.    Neck: Negative for neck pain and neck stiffness.   Cardiovascular:  Negative for chest pain, leg swelling, palpitations, sob on exertion and passing out.   Eyes:  Negative for eye discharge and eye redness.   Respiratory:  Negative for cough, sputum production, COPD, stridor, wheezing and asthma.    Gastrointestinal:  Positive for nausea and vomiting. Negative for abdominal pain, abdominal bloating, history of abdominal surgery and diarrhea.   Genitourinary:  Negative for dysuria, frequency, urgency and urine decreased.   Musculoskeletal:  Positive for pain, abnormal ROM of joint, back pain, pain with walking and history of spine disorder. Negative for trauma, joint pain, joint swelling, arthritis, gout and muscle ache.   Skin:  Negative for rash.   Allergic/Immunologic: Negative for environmental allergies, seasonal allergies, food allergies, asthma, chronic cough and sneezing.   Neurological:  Positive for dizziness and coordination disturbances. Negative for history of vertigo, light-headedness, passing out, facial drooping, loss of balance, headaches, disorientation, altered mental status, loss of consciousness, numbness, tingling, seizures and tremors.   Psychiatric/Behavioral:  Negative for altered mental  status, disorientation and nervous/anxious. The patient is not nervous/anxious.       Objective:     Physical Exam   Constitutional: He is oriented to person, place, and time. He appears well-developed. He is cooperative. No distress.      Comments:Patient is awake and alert, laying left lateral recumbent, speaking and answering in complete sentence     normal  HENT:   Head: Normocephalic and atraumatic.   Ears:   Right Ear: Hearing, tympanic membrane, external ear and ear canal normal.   Left Ear: Hearing, tympanic membrane, external ear and ear canal normal.   Nose: Nose normal. No mucosal edema, rhinorrhea, nasal deformity or congestion. No epistaxis. Right sinus exhibits no maxillary sinus tenderness and no frontal sinus tenderness. Left sinus exhibits no maxillary sinus tenderness and no frontal sinus tenderness.   Mouth/Throat: Uvula is midline, oropharynx is clear and moist and mucous membranes are normal. Mucous membranes are moist. No trismus in the jaw. Normal dentition. No uvula swelling. Oropharynx is clear.   Eyes: Conjunctivae and lids are normal. Pupils are equal, round, and reactive to light. Lids are everted and swept, no foreign bodies found. Right conjunctiva is not injected. Left conjunctiva is not injected. Right eye exhibits nystagmus. Right eye exhibits normal extraocular motion. Left eye exhibits nystagmus. Left eye exhibits normal extraocular motion. Extraocular movement intact vision grossly intact gaze aligned appropriately      Comments: Horizontal nystagmus noted to bilateral eyes when pt is laying supine when provider walked into room.unable to reproduce nystagmus   Neck: Trachea normal and phonation normal. Neck supple.   Cardiovascular: Normal rate, regular rhythm, normal heart sounds and normal pulses.   Pulmonary/Chest: Effort normal and breath sounds normal.         Comments: Course breath sounds    Abdominal: Normal appearance.   Neurological: He is alert and oriented to person,  place, and time. He exhibits normal muscle tone.   Skin: Skin is warm, dry and intact.   Psychiatric: His speech is normal and behavior is normal. Mood, judgment and thought content normal.   Nursing note and vitals reviewed.chaperone present       No results found.      Assessment:     1. Vertigo    2. Nystagmus    3. Exposure to influenza    4. Nausea and vomiting, unspecified vomiting type      Patient presents with clinical exam findings and history consistent with above.      On exam, patient is nontoxic appearing and vitals are stable.      Diagnostic testing results were reviewed and discussed with patient/guardian.   Tests ordered in clinic:  Results for orders placed or performed in visit on 02/01/24   SARS Coronavirus 2 Antigen, POCT Manual Read   Result Value Ref Range    SARS Coronavirus 2 Antigen Negative Negative     Acceptable Yes        Previous progress notes/admissions/labs and medications were reviewed.      Plan:   Partner states they are former smokers on nicotine patches; she is a nurse and states his lungs sound abnormal at baseline.  Requested for COVID-19 and flu testing; will do COVID-19 testing since patient is  self pay.  inocencio hallpike maneuver was attempted; pt unable to lay supine abruptly due to recent back surgery.    Vertigo  -     meclizine (ANTIVERT) 12.5 mg tablet; Initial: 12.5 to 25 mg every 6 to 12 hours as needed; may increase to 50 mg per dose as needed based on response and tolerability; maximum dose: 100 mg/day  Dispense: 30 tablet; Refill: 0  -     Ambulatory referral/consult to Internal Medicine    Nystagmus  -     Discontinue: promethazine injection 25 mg  -     SARS Coronavirus 2 Antigen, POCT Manual Read  -     meclizine (ANTIVERT) 12.5 mg tablet; Initial: 12.5 to 25 mg every 6 to 12 hours as needed; may increase to 50 mg per dose as needed based on response and tolerability; maximum dose: 100 mg/day  Dispense: 30 tablet; Refill: 0    Exposure to  "influenza  -     oseltamivir (TAMIFLU) 75 MG capsule; Take 1 capsule (75 mg total) by mouth 2 (two) times daily. for 5 days  Dispense: 10 capsule; Refill: 0  -     Ambulatory referral/consult to Internal Medicine    Nausea and vomiting, unspecified vomiting type             Additional MDM:     Heart Failure Score:   COPD = No            1) See orders for this visit as documented in the electronic medical record.  2) Symptomatic therapy suggested: use acetaminophen/ibuprofen every 6-8 hours prn pain or fever, push fluids.   3) Call or return to clinic prn if these symptoms worsen or fail to improve as anticipated.    Discussed results/diagnosis/plan with patient in clinic.  We had shared decision making for patient's treatment. Patient verbalized understanding and in agreement with current treatment plan.     Patient was instructed to return for re-evaluation with urgent care or PCP for continued outpatient workup and management if symptoms do not improve/worsening symptoms. Strict ED versus clinic precautions given in depth.    Discharge and follow-up instructions given verbally/printed with the patient who expressed understanding. The instructions and results [facility] also [facility]              [facility] "Evelyn" JANE Yuen          Patient Instructions   Continue zofran 4 mg for nausea. Meclizine is used for nausea and motion sickness.     Recommend UrgentENT.      Meclizine   Oral: Initial: 12.5 to 25 mg every 6 to 12 hours as needed; may increase to 50 mg per dose as needed based on response and tolerability; maximum dose: 100 mg/day     The most common side effects of oseltamivir (TAMIFLU) are nausea and vomiting. Usually, nausea and vomiting are not severe and happen in the first 2 days of treatment. Taking Tamiflu with food may lessen the chance of getting these side effects. Other side effects include stomach (abdominal) pain, nosebleeds, headache, and feeling tired (fatigue).  Psychiatric effects such " as anxiety, irritation, delirium, hallucinations, and nightmares have been reported.       If not allergic, take Tylenol (Acetaminophen) 650 mg to  1 g every 6 hours as needed as needed for fever/pain and/or Motrin (Ibuprofen) 600 to 800 mg every 6 hours as needed for pain and/or fever      Please remember that you have received care at an urgent care today. Urgent cares are not emergency rooms and are not equipped to handle life threatening emergencies and cannot rule in or out certain medical conditions and you may be released before all of your medical problems are known or treated. Please arrange follow up with your primary care physician or speciality clinic  within 2-5 days if your signs and symptoms have not resolved or worsen. Patient can call our Referral Hotline at [phone] to make an appointment.    Please return here or go to the Emergency Department for any concerns or worsening of condition.Patient was educated on signs/symptoms that would warrant emergent medical attention. Patient verbalized understanding.  You have signs of stroke like sudden:  Numbness or weakness of the face, arm, or leg, especially on one side of the body.  Confusion, trouble speaking or understanding.  Trouble seeing in one or both eyes.  Trouble walking, dizziness, loss of balance or coordination.  Severe headache with no known cause.  You pass out.  Your vertigo episodes are lasting longer or coming more frequently than they used to.  You are not able to walk or stand because of your vertigo.  You continue to throw up.

## 2024-02-02 NOTE — PATIENT INSTRUCTIONS
Continue zofran 4 mg for nausea. Meclizine is used for nausea and motion sickness.     Recommend UrgentENT.      Meclizine   Oral: Initial: 12.5 to 25 mg every 6 to 12 hours as needed; may increase to 50 mg per dose as needed based on response and tolerability; maximum dose: 100 mg/day     The most common side effects of oseltamivir (TAMIFLU) are nausea and vomiting. Usually, nausea and vomiting are not severe and happen in the first 2 days of treatment. Taking Tamiflu with food may lessen the chance of getting these side effects. Other side effects include stomach (abdominal) pain, nosebleeds, headache, and feeling tired (fatigue).  Psychiatric effects such as anxiety, irritation, delirium, hallucinations, and nightmares have been reported.       If not allergic, take Tylenol (Acetaminophen) 650 mg to  1 g every 6 hours as needed as needed for fever/pain and/or Motrin (Ibuprofen) 600 to 800 mg every 6 hours as needed for pain and/or fever      Please remember that you have received care at an urgent care today. Urgent cares are not emergency rooms and are not equipped to handle life threatening emergencies and cannot rule in or out certain medical conditions and you may be released before all of your medical problems are known or treated. Please arrange follow up with your primary care physician or speciality clinic  within 2-5 days if your signs and symptoms have not resolved or worsen. Patient can call our Referral Hotline at [phone] to make an appointment.    Please return here or go to the Emergency Department for any concerns or worsening of condition.Patient was educated on signs/symptoms that would warrant emergent medical attention. Patient verbalized understanding.  You have signs of stroke like sudden:  Numbness or weakness of the face, arm, or leg, especially on one side of the body.  Confusion, trouble speaking or understanding.  Trouble seeing in one or both eyes.  Trouble walking, dizziness,  loss of balance or coordination.  Severe headache with no known cause.  You pass out.  Your vertigo episodes are lasting longer or coming more frequently than they used to.  You are not able to walk or stand because of your vertigo.  You continue to throw up.

## (undated) DEVICE — GREAT WHITE STEALTH

## (undated) DEVICE — DRESSING N ADH OIL EMUL 3X8

## (undated) DEVICE — DRESSING N ADH OIL EMUL 3X3

## (undated) DEVICE — DRAPE STERI U-SHAPED 47X51IN

## (undated) DEVICE — PAD ABD 8X10 STERILE

## (undated) DEVICE — BANDAGE MATRIX HK LOOP 4IN 5YD

## (undated) DEVICE — SUT ETHILON 3/0 18IN PS-1

## (undated) DEVICE — SEE MEDLINE ITEM 157150

## (undated) DEVICE — GLOVE BIOGEL ORTHOPEDIC 7.5

## (undated) DEVICE — BANDAGE DERMACEA STRETCH 4X1IN

## (undated) DEVICE — SUT CTD VICRYL 0 UND BR CT

## (undated) DEVICE — GAUZE SPONGE 4X4 12PLY

## (undated) DEVICE — SPLINT PLASTER FAST SET 5X30IN

## (undated) DEVICE — PAD CAST SPECIALIST STRL 6

## (undated) DEVICE — SOL IRR NACL .9% 3000ML

## (undated) DEVICE — SPONGE LAP 18X18 PREWASHED

## (undated) DEVICE — STOCKINETTE TUBULAR 2PL 6 X 4

## (undated) DEVICE — PAD CAST SPECIALIST STRL 4

## (undated) DEVICE — SEE MEDLINE ITEM 146298

## (undated) DEVICE — BANDAGE ESMARK 6X12

## (undated) DEVICE — PROBE ARTHSCP EDGE ENERGY 30

## (undated) DEVICE — APPLICATOR CHLORAPREP ORN 26ML

## (undated) DEVICE — DRESSING XEROFORM FOIL PK 1X8

## (undated) DEVICE — MARKER SKIN STND TIP BLUE BARR

## (undated) DEVICE — BANDAGE ESMARK ELASTIC ST 4X9

## (undated) DEVICE — TRAY MINOR ORTHO

## (undated) DEVICE — SUT 0 30IN ETHIBOND EXCEL G

## (undated) DEVICE — KIT ASSISTARM ANKLE CUSTOM

## (undated) DEVICE — Device

## (undated) DEVICE — SUT CTD VICRYL 0 UND BR

## (undated) DEVICE — SUT VICRYL PLUS 0 CT1 18IN

## (undated) DEVICE — BLADE TONGUE DEPRESSOR STRL

## (undated) DEVICE — SUT ETHILON 3-0 PS2 18 BLK

## (undated) DEVICE — BANDAGE MATRIX HK LOOP 6IN 5YD

## (undated) DEVICE — SUT 2-0 VICRYL / SH (J417)

## (undated) DEVICE — ELECTRODE REM PLYHSV RETURN 9

## (undated) DEVICE — BLADE SURG CARBON STEEL #10

## (undated) DEVICE — TUBE SET INFLOW/OUTFLOW

## (undated) DEVICE — TOURNIQUET SB QC DP 34X4IN

## (undated) DEVICE — SPONGE GAUZE 16PLY 4X4

## (undated) DEVICE — NDL 22GA X1 1/2 REG BEVEL